# Patient Record
Sex: MALE | Race: WHITE | URBAN - NONMETROPOLITAN AREA
[De-identification: names, ages, dates, MRNs, and addresses within clinical notes are randomized per-mention and may not be internally consistent; named-entity substitution may affect disease eponyms.]

---

## 2018-09-10 ENCOUNTER — APPOINTMENT (RX ONLY)
Dept: URBAN - NONMETROPOLITAN AREA CLINIC 1 | Facility: CLINIC | Age: 78
Setting detail: DERMATOLOGY
End: 2018-09-10

## 2018-09-10 DIAGNOSIS — Z85.828 PERSONAL HISTORY OF OTHER MALIGNANT NEOPLASM OF SKIN: ICD-10-CM

## 2018-09-10 PROBLEM — Z85.820 PERSONAL HISTORY OF MALIGNANT MELANOMA OF SKIN: Status: ACTIVE | Noted: 2018-09-10

## 2018-09-10 PROBLEM — M12.9 ARTHROPATHY, UNSPECIFIED: Status: ACTIVE | Noted: 2018-09-10

## 2018-09-10 PROBLEM — L85.3 XEROSIS CUTIS: Status: ACTIVE | Noted: 2018-09-10

## 2018-09-10 PROBLEM — I10 ESSENTIAL (PRIMARY) HYPERTENSION: Status: ACTIVE | Noted: 2018-09-10

## 2018-09-10 PROBLEM — N40.0 BENIGN PROSTATIC HYPERPLASIA WITHOUT LOWER URINARY TRACT SYMPTOMS: Status: ACTIVE | Noted: 2018-09-10

## 2018-09-10 PROBLEM — C44.41 BASAL CELL CARCINOMA OF SKIN OF SCALP AND NECK: Status: ACTIVE | Noted: 2018-09-10

## 2018-09-10 PROBLEM — L29.8 OTHER PRURITUS: Status: ACTIVE | Noted: 2018-09-10

## 2018-09-10 PROBLEM — H91.90 UNSPECIFIED HEARING LOSS, UNSPECIFIED EAR: Status: ACTIVE | Noted: 2018-09-10

## 2018-09-10 PROBLEM — E13.9 OTHER SPECIFIED DIABETES MELLITUS WITHOUT COMPLICATIONS: Status: ACTIVE | Noted: 2018-09-10

## 2018-09-10 PROCEDURE — ? ADDITIONAL NOTES

## 2018-09-10 PROCEDURE — 17272 DSTR MAL LES S/N/H/F/G 1.1-2: CPT

## 2018-09-10 PROCEDURE — ? COUNSELING

## 2018-09-10 PROCEDURE — ? CURETTAGE AND DESTRUCTION WITH PATHOLOGY

## 2018-09-10 PROCEDURE — 99213 OFFICE O/P EST LOW 20 MIN: CPT | Mod: 25

## 2018-09-10 ASSESSMENT — LOCATION ZONE DERM: LOCATION ZONE: NECK

## 2018-09-10 ASSESSMENT — LOCATION SIMPLE DESCRIPTION DERM: LOCATION SIMPLE: RIGHT ANTERIOR NECK

## 2018-09-10 ASSESSMENT — LOCATION DETAILED DESCRIPTION DERM: LOCATION DETAILED: RIGHT INFERIOR ANTERIOR NECK

## 2018-09-10 NOTE — PROCEDURE: CURETTAGE AND DESTRUCTION WITH PATHOLOGY
Bill For Surgical Tray: no
Lab: -[id number]
Additional Information: (Optional): The wound was cleaned, and a pressure dressing was applied.  The patient received detailed post-op instructions.
Number Of Curettages: 1
Anesthesia Type: 1% lidocaine with epinephrine
Biopsy Type: H and E
What Was Performed First?: Curettage
Consent was obtained from the patient. The risks, benefits and alternatives to therapy were discussed in detail. Specifically, the risks of infection, scarring, bleeding, prolonged wound healing, nerve injury, incomplete removal, allergy to anesthesia and recurrence were addressed. Alternatives to ED&C, such as: surgical removal and XRT were also discussed.  Prior to the procedure, the treatment site was clearly identified and confirmed by the patient. All components of Universal Protocol/PAUSE Rule completed.
Size Of Lesion After Curettage: 1.5
Cautery Type: electrodesiccation
Histology Text: Following the procedure a portion of the curetted material was sent for histologic evaluation.
Post-Care Instructions: I reviewed with the patient in detail post-care instructions. Patient is to keep the area dry for 48 hours, and not to engage in any swimming until the area is healed. Should the patient develop any fevers, chills, bleeding, severe pain patient will contact the office immediately.
Detail Level: Detailed
Bill As A Line Item Or As Units: Line Item
Billing Type: Third-Party Bill

## 2018-09-10 NOTE — HPI: SKIN LESION (SQUAMOUS CELL CARCINOMA)
How Severe Is Your Skin Cancer?: mild
Is This A New Presentation, Or A Follow-Up?: Follow Up Squamous Cell Carcinoma
When Was Squamous Cell Carcinoma Biopsied? (Optional): Excision preformed on 06/01/2018 peripheral margins positive
Location From Outside Provider (Will Override Previously Chosen Location): Sternum

## 2018-09-10 NOTE — PROCEDURE: ADDITIONAL NOTES
Detail Level: Simple
Additional Notes: Will monitor for regrowth.  Lateral margins were treated with fluorouracil 5% cream for four weeks twice a day.

## 2018-12-11 ENCOUNTER — APPOINTMENT (RX ONLY)
Dept: URBAN - NONMETROPOLITAN AREA CLINIC 1 | Facility: CLINIC | Age: 78
Setting detail: DERMATOLOGY
End: 2018-12-11

## 2018-12-11 DIAGNOSIS — Z85.828 PERSONAL HISTORY OF OTHER MALIGNANT NEOPLASM OF SKIN: ICD-10-CM

## 2018-12-11 DIAGNOSIS — D485 NEOPLASM OF UNCERTAIN BEHAVIOR OF SKIN: ICD-10-CM

## 2018-12-11 PROBLEM — D48.5 NEOPLASM OF UNCERTAIN BEHAVIOR OF SKIN: Status: ACTIVE | Noted: 2018-12-11

## 2018-12-11 PROCEDURE — 11100: CPT

## 2018-12-11 PROCEDURE — ? CURETTAGE AND DESTRUCTION WITH PATHOLOGY

## 2018-12-11 PROCEDURE — 99213 OFFICE O/P EST LOW 20 MIN: CPT | Mod: 25

## 2018-12-11 PROCEDURE — ? ADDITIONAL NOTES

## 2018-12-11 PROCEDURE — ? COUNSELING

## 2018-12-11 ASSESSMENT — LOCATION SIMPLE DESCRIPTION DERM
LOCATION SIMPLE: RIGHT ANTERIOR NECK
LOCATION SIMPLE: LEFT FOREHEAD

## 2018-12-11 ASSESSMENT — LOCATION ZONE DERM
LOCATION ZONE: FACE
LOCATION ZONE: NECK

## 2018-12-11 ASSESSMENT — LOCATION DETAILED DESCRIPTION DERM
LOCATION DETAILED: LEFT LATERAL FOREHEAD
LOCATION DETAILED: RIGHT CLAVICULAR NECK

## 2018-12-11 NOTE — PROCEDURE: ADDITIONAL NOTES
Additional Notes: Specimen sent to [employer].\\nMOHS if positive. Additional Notes: Specimen sent to [facility].\\nMOHS if positive.

## 2018-12-28 ENCOUNTER — APPOINTMENT (RX ONLY)
Dept: URBAN - NONMETROPOLITAN AREA CLINIC 1 | Facility: CLINIC | Age: 78
Setting detail: DERMATOLOGY
End: 2018-12-28

## 2018-12-28 DIAGNOSIS — Z87.2 PERSONAL HISTORY OF DISEASES OF THE SKIN AND SUBCUTANEOUS TISSUE: ICD-10-CM

## 2018-12-28 PROCEDURE — ? COUNSELING

## 2018-12-28 PROCEDURE — 99213 OFFICE O/P EST LOW 20 MIN: CPT

## 2018-12-28 ASSESSMENT — LOCATION DETAILED DESCRIPTION DERM: LOCATION DETAILED: LEFT FOREHEAD

## 2018-12-28 ASSESSMENT — LOCATION ZONE DERM: LOCATION ZONE: FACE

## 2018-12-28 ASSESSMENT — LOCATION SIMPLE DESCRIPTION DERM: LOCATION SIMPLE: LEFT FOREHEAD

## 2018-12-28 NOTE — PROCEDURE: COUNSELING
Patient Specific Counseling (Will Not Stick From Patient To Patient): Discussed possibly excision to area if it doesn’t heal well. Will re-evaluate in 1 mo.
Detail Level: Detailed

## 2019-02-11 ENCOUNTER — APPOINTMENT (RX ONLY)
Dept: URBAN - NONMETROPOLITAN AREA CLINIC 1 | Facility: CLINIC | Age: 79
Setting detail: DERMATOLOGY
End: 2019-02-11

## 2019-02-11 DIAGNOSIS — D22 MELANOCYTIC NEVI: ICD-10-CM

## 2019-02-11 PROBLEM — D22.71 MELANOCYTIC NEVI OF RIGHT LOWER LIMB, INCLUDING HIP: Status: ACTIVE | Noted: 2019-02-11

## 2019-02-11 PROBLEM — D48.5 NEOPLASM OF UNCERTAIN BEHAVIOR OF SKIN: Status: ACTIVE | Noted: 2019-02-11

## 2019-02-11 PROCEDURE — ? BIOPSY BY SHAVE METHOD

## 2019-02-11 PROCEDURE — ? COUNSELING

## 2019-02-11 PROCEDURE — 11102 TANGNTL BX SKIN SINGLE LES: CPT

## 2019-02-11 PROCEDURE — 99213 OFFICE O/P EST LOW 20 MIN: CPT | Mod: 25

## 2019-02-11 PROCEDURE — ? OBSERVATION AND MEASURE

## 2019-02-11 ASSESSMENT — LOCATION SIMPLE DESCRIPTION DERM: LOCATION SIMPLE: RIGHT THIGH

## 2019-02-11 ASSESSMENT — LOCATION DETAILED DESCRIPTION DERM: LOCATION DETAILED: RIGHT POSTERIOR LATERAL PROXIMAL THIGH

## 2019-02-11 ASSESSMENT — LOCATION ZONE DERM: LOCATION ZONE: LEG

## 2019-02-11 NOTE — PROCEDURE: BIOPSY BY SHAVE METHOD
Post-Care Instructions: Detailed post-care instructions were given and reviewed. Patient is to keep the biopsy site dry overnight, and then apply vaseline daily until healed.  Patient was instructed to call if any questions or problems arise.

## 2019-02-11 NOTE — PROCEDURE: BIOPSY BY SHAVE METHOD
Consent: Written consent was obtained, with patient's permission, and risks were reviewed per signed consent form.  Biopsy was performed with patient's verbal permission.

## 2019-03-08 ENCOUNTER — APPOINTMENT (RX ONLY)
Dept: URBAN - NONMETROPOLITAN AREA CLINIC 1 | Facility: CLINIC | Age: 79
Setting detail: DERMATOLOGY
End: 2019-03-08

## 2019-03-08 VITALS — DIASTOLIC BLOOD PRESSURE: 72 MMHG | SYSTOLIC BLOOD PRESSURE: 101 MMHG

## 2019-03-08 PROBLEM — Z85.828 PERSONAL HISTORY OF OTHER MALIGNANT NEOPLASM OF SKIN: Status: ACTIVE | Noted: 2019-03-08

## 2019-03-08 PROBLEM — C44.329 SQUAMOUS CELL CARCINOMA OF SKIN OF OTHER PARTS OF FACE: Status: ACTIVE | Noted: 2019-03-08

## 2019-03-08 PROBLEM — D48.5 NEOPLASM OF UNCERTAIN BEHAVIOR OF SKIN: Status: ACTIVE | Noted: 2019-03-08

## 2019-03-08 PROBLEM — C44.319 BASAL CELL CARCINOMA OF SKIN OF OTHER PARTS OF FACE: Status: ACTIVE | Noted: 2019-03-08

## 2019-03-08 PROCEDURE — 17311 MOHS 1 STAGE H/N/HF/G: CPT

## 2019-03-08 PROCEDURE — 11103 TANGNTL BX SKIN EA SEP/ADDL: CPT

## 2019-03-08 PROCEDURE — ? PATIENT SPECIFIC COUNSELING

## 2019-03-08 PROCEDURE — ? ADDITIONAL NOTES

## 2019-03-08 PROCEDURE — 13132 CMPLX RPR F/C/C/M/N/AX/G/H/F: CPT | Mod: 59

## 2019-03-08 PROCEDURE — 11102 TANGNTL BX SKIN SINGLE LES: CPT | Mod: 59

## 2019-03-08 PROCEDURE — ? BIOPSY BY SHAVE METHOD WITH FROZEN SECTION

## 2019-03-08 PROCEDURE — 17311 MOHS 1 STAGE H/N/HF/G: CPT | Mod: 76

## 2019-03-08 PROCEDURE — 88331 PATH CONSLTJ SURG 1 BLK 1SPC: CPT | Mod: 59

## 2019-03-08 PROCEDURE — 17312 MOHS ADDL STAGE: CPT

## 2019-03-08 PROCEDURE — ? MOHS SURGERY

## 2019-03-08 PROCEDURE — 13133 CMPLX RPR F/C/C/M/N/AX/G/H/F: CPT | Mod: 59

## 2019-03-08 NOTE — PROCEDURE: BIOPSY BY SHAVE METHOD WITH FROZEN SECTION
Anticipated Plan (Based On Presumed Biopsy Results): Mohs today and repair - likely with adjacent defect

## 2019-03-08 NOTE — PROCEDURE: PATIENT SPECIFIC COUNSELING
In this patient's case, the two sites biopsied were in close approximation to today's primary tumor site and required biopsies because tumor in this area needed to be cleared to ensure that proper reconstruction does not involve a tumor-bearing area.
Detail Level: Zone

## 2019-03-22 ENCOUNTER — APPOINTMENT (RX ONLY)
Dept: URBAN - NONMETROPOLITAN AREA CLINIC 1 | Facility: CLINIC | Age: 79
Setting detail: DERMATOLOGY
End: 2019-03-22

## 2019-03-22 DIAGNOSIS — Z48.817 ENCOUNTER FOR SURGICAL AFTERCARE FOLLOWING SURGERY ON THE SKIN AND SUBCUTANEOUS TISSUE: ICD-10-CM

## 2019-03-22 PROCEDURE — ? PRESCRIPTION

## 2019-03-22 PROCEDURE — ? ADDITIONAL NOTES

## 2019-03-22 PROCEDURE — 99024 POSTOP FOLLOW-UP VISIT: CPT

## 2019-03-22 PROCEDURE — ? POST-OP WOUND CHECK

## 2019-03-22 RX ORDER — CLINDAMYCIN HYDROCHLORIDE 300 MG/1
CAPSULE ORAL
Qty: 14 | Refills: 0 | Status: ERX | COMMUNITY
Start: 2019-03-22

## 2019-03-22 RX ADMIN — CLINDAMYCIN HYDROCHLORIDE: 300 CAPSULE ORAL at 16:11

## 2019-03-22 ASSESSMENT — LOCATION ZONE DERM: LOCATION ZONE: FACE

## 2019-03-22 ASSESSMENT — LOCATION DETAILED DESCRIPTION DERM: LOCATION DETAILED: LEFT FOREHEAD

## 2019-03-22 ASSESSMENT — LOCATION SIMPLE DESCRIPTION DERM: LOCATION SIMPLE: LEFT FOREHEAD

## 2019-03-22 NOTE — PROCEDURE: ADDITIONAL NOTES
Additional Notes: Patient has not been changing bandage daily. Had a very bad odor when he came in. A culture and sensitivity was done today
Detail Level: Simple

## 2019-03-22 NOTE — PROCEDURE: POST-OP WOUND CHECK
Detail Level: Detailed
Add 01721 Cpt? (Important Note: In 2017 The Use Of 58336 Is Being Tracked By Cms To Determine Future Global Period Reimbursement For Global Periods): yes

## 2019-04-02 ENCOUNTER — APPOINTMENT (RX ONLY)
Dept: URBAN - NONMETROPOLITAN AREA CLINIC 1 | Facility: CLINIC | Age: 79
Setting detail: DERMATOLOGY
End: 2019-04-02

## 2019-04-02 DIAGNOSIS — Z48.817 ENCOUNTER FOR SURGICAL AFTERCARE FOLLOWING SURGERY ON THE SKIN AND SUBCUTANEOUS TISSUE: ICD-10-CM

## 2019-04-02 PROCEDURE — ? PRESCRIPTION

## 2019-04-02 PROCEDURE — ? OTHER

## 2019-04-02 PROCEDURE — ? POST-OP WOUND CHECK

## 2019-04-02 PROCEDURE — 99024 POSTOP FOLLOW-UP VISIT: CPT

## 2019-04-02 RX ORDER — MUPIROCIN 20 MG/G
OINTMENT TOPICAL
Qty: 1 | Refills: 0 | Status: ERX | COMMUNITY
Start: 2019-04-02

## 2019-04-02 RX ADMIN — MUPIROCIN: 20 OINTMENT TOPICAL at 15:13

## 2019-04-02 ASSESSMENT — LOCATION SIMPLE DESCRIPTION DERM: LOCATION SIMPLE: LEFT FOREHEAD

## 2019-04-02 ASSESSMENT — LOCATION DETAILED DESCRIPTION DERM: LOCATION DETAILED: LEFT SUPERIOR FOREHEAD

## 2019-04-02 ASSESSMENT — LOCATION ZONE DERM: LOCATION ZONE: FACE

## 2019-04-02 NOTE — PROCEDURE: POST-OP WOUND CHECK
Detail Level: Detailed
Add 63022 Cpt? (Important Note: In 2017 The Use Of 95978 Is Being Tracked By Cms To Determine Future Global Period Reimbursement For Global Periods): yes

## 2019-04-02 NOTE — PROCEDURE: OTHER
Other (Free Text): Wound cleaned with saline solution then applied a thin layer of mupiriocin ointment.
Detail Level: Zone
Note Text (......Xxx Chief Complaint.): This diagnosis correlates with the

## 2019-04-15 ENCOUNTER — APPOINTMENT (RX ONLY)
Dept: URBAN - NONMETROPOLITAN AREA CLINIC 1 | Facility: CLINIC | Age: 79
Setting detail: DERMATOLOGY
End: 2019-04-15

## 2019-04-15 DIAGNOSIS — Z48.817 ENCOUNTER FOR SURGICAL AFTERCARE FOLLOWING SURGERY ON THE SKIN AND SUBCUTANEOUS TISSUE: ICD-10-CM

## 2019-04-15 PROCEDURE — 99024 POSTOP FOLLOW-UP VISIT: CPT

## 2019-04-15 PROCEDURE — ? POST-OP WOUND CHECK

## 2019-04-15 ASSESSMENT — LOCATION SIMPLE DESCRIPTION DERM: LOCATION SIMPLE: LEFT FOREHEAD

## 2019-04-15 ASSESSMENT — LOCATION DETAILED DESCRIPTION DERM: LOCATION DETAILED: LEFT SUPERIOR FOREHEAD

## 2019-04-15 ASSESSMENT — LOCATION ZONE DERM: LOCATION ZONE: FACE

## 2019-04-15 NOTE — PROCEDURE: POST-OP WOUND CHECK
Add 34605 Cpt? (Important Note: In 2017 The Use Of 00067 Is Being Tracked By Cms To Determine Future Global Period Reimbursement For Global Periods): yes
Detail Level: Detailed
Additional Comments: Patient to continue Mupirocin 2% ointment BID.
Wound Evaluated By: Abhay Lawson PA-C

## 2019-04-22 NOTE — PROCEDURE: MOHS SURGERY
Start taking 1/2 to 1 capful of miralax every day or every other day   If symptoms are not improving, we can proceed with a colonoscopy for further evaluation.   Please call [phone] to schedule the colonoscopy with Dr. Cueva.    Mastoid Interpolation Flap Text: A decision was made to reconstruct the defect utilizing an interpolation axial flap and a staged reconstruction.  A telfa template was made of the defect.  This telfa template was then used to outline the mastoid interpolation flap.  The donor area for the pedicle flap was then injected with anesthesia.  The flap was excised through the skin and subcutaneous tissue down to the layer of the underlying musculature.  The pedicle flap was carefully excised within this deep plane to maintain its blood supply.  The edges of the donor site were undermined.   The donor site was closed in a primary fashion.  The pedicle was then rotated into position and sutured.  Once the tube was sutured into place, adequate blood supply was confirmed with blanching and refill.  The pedicle was then wrapped with xeroform gauze and dressed appropriately with a telfa and gauze bandage to ensure continued blood supply and protect the attached pedicle.

## 2020-10-13 NOTE — PROCEDURE: BIOPSY BY SHAVE METHOD WITH FROZEN SECTION
-- DO NOT REPLY / DO NOT REPLY ALL --  -- Message is from the [facility]--    COVID-19 Universal Screening: N/A - Not about scheduling    General Patient Message      Reason for Call: Patient wife called for second time stating patient is bleeding frim his penis this has happened before and wa sprescibed meds but it does not seem to be working. Please call back to discuss the following     Caller Information       Type Contact Phone    10/13/2020 10:46 AM CDT Phone (Incoming) STEVE CLARK (Emergency Contact) [phone]          Alternative phone number: none    Turnaround time given to caller:   \"This message will be sent to [state Provider's name]. The clinical team will fulfill your request as soon as they review your message.\"     Post-Care Instructions: I reviewed with the patient in detail post-care instructions. Patient is to keep the biopsy site dry overnight, and then apply bacitracin twice daily until healed. Patient may apply hydrogen peroxide soaks to remove any crusting.

## 2021-11-16 NOTE — PROCEDURE: MOHS SURGERY
Stable. Burow's Advancement Flap Text: The defect edges were debeveled with a #15 scalpel blade.  Given the location of the defect and the proximity to free margins a Burow's advancement flap was deemed most appropriate.  Using a sterile surgical marker, the appropriate advancement flap was drawn incorporating the defect and placing the expected incisions within the relaxed skin tension lines where possible.    The area thus outlined was incised deep to adipose tissue with a #15 scalpel blade.  The skin margins were undermined to an appropriate distance in all directions utilizing iris scissors.

## 2023-03-30 ENCOUNTER — OFFICE VISIT (OUTPATIENT)
Dept: FAMILY MEDICINE CLINIC | Facility: CLINIC | Age: 83
End: 2023-03-30
Payer: COMMERCIAL

## 2023-03-30 VITALS
TEMPERATURE: 98.1 F | WEIGHT: 249.4 LBS | HEIGHT: 69 IN | DIASTOLIC BLOOD PRESSURE: 60 MMHG | HEART RATE: 72 BPM | SYSTOLIC BLOOD PRESSURE: 118 MMHG | OXYGEN SATURATION: 96 % | BODY MASS INDEX: 36.94 KG/M2

## 2023-03-30 DIAGNOSIS — L21.8 OTHER SEBORRHEIC DERMATITIS: ICD-10-CM

## 2023-03-30 DIAGNOSIS — I10 PRIMARY HYPERTENSION: ICD-10-CM

## 2023-03-30 DIAGNOSIS — C67.9 PAPILLARY ADENOCARCINOMA OF BLADDER (HCC): ICD-10-CM

## 2023-03-30 DIAGNOSIS — E78.2 MIXED HYPERLIPIDEMIA: ICD-10-CM

## 2023-03-30 DIAGNOSIS — Z12.5 SCREENING FOR MALIGNANT NEOPLASM OF PROSTATE: ICD-10-CM

## 2023-03-30 DIAGNOSIS — E55.9 VITAMIN D DEFICIENCY: ICD-10-CM

## 2023-03-30 DIAGNOSIS — L85.3 XEROSIS OF SKIN: ICD-10-CM

## 2023-03-30 DIAGNOSIS — E11.65 UNCONTROLLED TYPE 2 DIABETES MELLITUS WITH HYPERGLYCEMIA (HCC): Primary | ICD-10-CM

## 2023-03-30 DIAGNOSIS — Z98.890 HISTORY OF TRANSURETHRAL DESTRUCTION OF BLADDER LESION: ICD-10-CM

## 2023-03-30 DIAGNOSIS — E11.39 DIABETIC EYES (HCC): ICD-10-CM

## 2023-03-30 DIAGNOSIS — R09.89 DECREASED PULSES IN FEET: ICD-10-CM

## 2023-03-30 LAB
BASOPHILS # BLD: 0.1 K/UL (ref 0–0.2)
BASOPHILS NFR BLD: 1 % (ref 0–2)
DIFFERENTIAL METHOD BLD: ABNORMAL
EOSINOPHIL # BLD: 0.9 K/UL (ref 0–0.8)
EOSINOPHIL NFR BLD: 8 % (ref 0.5–7.8)
ERYTHROCYTE [DISTWIDTH] IN BLOOD BY AUTOMATED COUNT: 13.7 % (ref 11.9–14.6)
EST. AVERAGE GLUCOSE BLD GHB EST-MCNC: 249 MG/DL
HBA1C MFR BLD: 10.3 % (ref 4.8–5.6)
HCT VFR BLD AUTO: 45.8 % (ref 41.1–50.3)
HGB BLD-MCNC: 14.9 G/DL (ref 13.6–17.2)
IMM GRANULOCYTES # BLD AUTO: 0.1 K/UL (ref 0–0.5)
IMM GRANULOCYTES NFR BLD AUTO: 1 % (ref 0–5)
LYMPHOCYTES # BLD: 4.7 K/UL (ref 0.5–4.6)
LYMPHOCYTES NFR BLD: 38 % (ref 13–44)
MCH RBC QN AUTO: 29.9 PG (ref 26.1–32.9)
MCHC RBC AUTO-ENTMCNC: 32.5 G/DL (ref 31.4–35)
MCV RBC AUTO: 92 FL (ref 82–102)
MONOCYTES # BLD: 0.9 K/UL (ref 0.1–1.3)
MONOCYTES NFR BLD: 8 % (ref 4–12)
NEUTS SEG # BLD: 5.6 K/UL (ref 1.7–8.2)
NEUTS SEG NFR BLD: 44 % (ref 43–78)
NRBC # BLD: 0 K/UL (ref 0–0.2)
PLATELET # BLD AUTO: 235 K/UL (ref 150–450)
PMV BLD AUTO: 10.2 FL (ref 9.4–12.3)
RBC # BLD AUTO: 4.98 M/UL (ref 4.23–5.6)
WBC # BLD AUTO: 12.3 K/UL (ref 4.3–11.1)

## 2023-03-30 PROCEDURE — 3046F HEMOGLOBIN A1C LEVEL >9.0%: CPT | Performed by: FAMILY MEDICINE

## 2023-03-30 PROCEDURE — 3078F DIAST BP <80 MM HG: CPT | Performed by: FAMILY MEDICINE

## 2023-03-30 PROCEDURE — 99203 OFFICE O/P NEW LOW 30 MIN: CPT | Performed by: FAMILY MEDICINE

## 2023-03-30 PROCEDURE — 3074F SYST BP LT 130 MM HG: CPT | Performed by: FAMILY MEDICINE

## 2023-03-30 PROCEDURE — 1123F ACP DISCUSS/DSCN MKR DOCD: CPT | Performed by: FAMILY MEDICINE

## 2023-03-30 RX ORDER — RIVAROXABAN 15 MG/1
TABLET, FILM COATED ORAL
COMMUNITY
Start: 2023-03-24

## 2023-03-30 RX ORDER — INSULIN DETEMIR 100 [IU]/ML
30 INJECTION, SOLUTION SUBCUTANEOUS 2 TIMES DAILY
COMMUNITY
Start: 2023-02-11

## 2023-03-30 RX ORDER — LISINOPRIL AND HYDROCHLOROTHIAZIDE 20; 12.5 MG/1; MG/1
TABLET ORAL
COMMUNITY
Start: 2023-02-21

## 2023-03-30 RX ORDER — DILTIAZEM HYDROCHLORIDE 360 MG/1
CAPSULE, EXTENDED RELEASE ORAL
COMMUNITY
Start: 2023-01-09

## 2023-03-30 RX ORDER — CARVEDILOL 6.25 MG/1
25 TABLET ORAL
COMMUNITY
Start: 2023-01-19

## 2023-03-30 RX ORDER — ROSUVASTATIN CALCIUM 10 MG/1
TABLET, COATED ORAL
COMMUNITY
Start: 2023-03-24

## 2023-03-30 RX ORDER — DIGOXIN 125 MCG
TABLET ORAL
COMMUNITY
Start: 2023-01-03

## 2023-03-30 RX ORDER — INSULIN ASPART 100 [IU]/ML
INJECTION, SOLUTION INTRAVENOUS; SUBCUTANEOUS
COMMUNITY
Start: 2023-02-17

## 2023-03-30 RX ORDER — BLOOD SUGAR DIAGNOSTIC
STRIP MISCELLANEOUS
COMMUNITY
Start: 2023-03-03

## 2023-03-30 SDOH — ECONOMIC STABILITY: INCOME INSECURITY: HOW HARD IS IT FOR YOU TO PAY FOR THE VERY BASICS LIKE FOOD, HOUSING, MEDICAL CARE, AND HEATING?: NOT HARD AT ALL

## 2023-03-30 SDOH — ECONOMIC STABILITY: FOOD INSECURITY: WITHIN THE PAST 12 MONTHS, YOU WORRIED THAT YOUR FOOD WOULD RUN OUT BEFORE YOU GOT MONEY TO BUY MORE.: NEVER TRUE

## 2023-03-30 SDOH — ECONOMIC STABILITY: HOUSING INSECURITY
IN THE LAST 12 MONTHS, WAS THERE A TIME WHEN YOU DID NOT HAVE A STEADY PLACE TO SLEEP OR SLEPT IN A SHELTER (INCLUDING NOW)?: NO

## 2023-03-30 SDOH — ECONOMIC STABILITY: FOOD INSECURITY: WITHIN THE PAST 12 MONTHS, THE FOOD YOU BOUGHT JUST DIDN'T LAST AND YOU DIDN'T HAVE MONEY TO GET MORE.: NEVER TRUE

## 2023-03-30 ASSESSMENT — PATIENT HEALTH QUESTIONNAIRE - PHQ9
1. LITTLE INTEREST OR PLEASURE IN DOING THINGS: 0
2. FEELING DOWN, DEPRESSED OR HOPELESS: 0
SUM OF ALL RESPONSES TO PHQ9 QUESTIONS 1 & 2: 0
SUM OF ALL RESPONSES TO PHQ QUESTIONS 1-9: 0

## 2023-03-31 LAB
25(OH)D3 SERPL-MCNC: 47.1 NG/ML (ref 30–100)
ALBUMIN SERPL-MCNC: 3.3 G/DL (ref 3.2–4.6)
ALBUMIN/GLOB SERPL: 1 (ref 0.4–1.6)
ALP SERPL-CCNC: 121 U/L (ref 50–136)
ALT SERPL-CCNC: 24 U/L (ref 12–65)
ANION GAP SERPL CALC-SCNC: 4 MMOL/L (ref 2–11)
AST SERPL-CCNC: 16 U/L (ref 15–37)
BILIRUB SERPL-MCNC: 0.7 MG/DL (ref 0.2–1.1)
BUN SERPL-MCNC: 29 MG/DL (ref 8–23)
CALCIUM SERPL-MCNC: 8.9 MG/DL (ref 8.3–10.4)
CHLORIDE SERPL-SCNC: 106 MMOL/L (ref 101–110)
CHOLEST SERPL-MCNC: 126 MG/DL
CO2 SERPL-SCNC: 28 MMOL/L (ref 21–32)
CREAT SERPL-MCNC: 1.6 MG/DL (ref 0.8–1.5)
GLOBULIN SER CALC-MCNC: 3.3 G/DL (ref 2.8–4.5)
GLUCOSE SERPL-MCNC: 258 MG/DL (ref 65–100)
HDLC SERPL-MCNC: 42 MG/DL (ref 40–60)
HDLC SERPL: 3
LDLC SERPL CALC-MCNC: 57.6 MG/DL
POTASSIUM SERPL-SCNC: 4.5 MMOL/L (ref 3.5–5.1)
PROT SERPL-MCNC: 6.6 G/DL (ref 6.3–8.2)
PSA SERPL-MCNC: 4.1 NG/ML
SODIUM SERPL-SCNC: 138 MMOL/L (ref 133–143)
TRIGL SERPL-MCNC: 132 MG/DL (ref 35–150)
VLDLC SERPL CALC-MCNC: 26.4 MG/DL (ref 6–23)

## 2023-04-01 ASSESSMENT — ENCOUNTER SYMPTOMS
BLURRED VISION: 0
PHOTOPHOBIA: 0
SHORTNESS OF BREATH: 0
COLOR CHANGE: 0
EYE PAIN: 0
COUGH: 0
NAUSEA: 0
ABDOMINAL PAIN: 0
SORE THROAT: 0
BLOOD IN STOOL: 0
ABDOMINAL DISTENTION: 0

## 2023-04-01 NOTE — PROGRESS NOTES
Dmitri Sun  1940 is a 78 y.o. male ,Established patient, here for evaluation of the following chief complaint(s):   Chief Complaint   Patient presents with    Establish Care     When covid hit kept up with specialist like cardio but lost pcp and just wanting to get one. Would like to have referrals put in for our system for he can get out of [place]. Would need an eye doctor and urologist the first step for him          1. Uncontrolled type 2 diabetes mellitus with hyperglycemia ([address]  -     [address]  -     CBC with Auto Differential; Future  -     Comprehensive Metabolic Panel; Future  -     Hemoglobin A1C; Future  -     [facility] - [employer]  -     Vascular duplex lower extremity arteries bilateral; Future  2. Screening for malignant neoplasm of prostate  -     PSA Screening; Future  3. Papillary adenocarcinoma of bladder ([address]  -     CBC with Auto Differential; Future  -     Comprehensive Metabolic Panel; Future  -     [facility], Urology, [place]  4. Primary hypertension  -     CBC with Auto Differential; Future  -     Comprehensive Metabolic Panel; Future  5. Vitamin D deficiency  -     Vitamin D 25 Hydroxy; Future  6. Mixed hyperlipidemia  -     Lipid Panel; Future  7. History of transurethral destruction of bladder lesion  -     [facility], Urology, [place]  8. Diabetic eyes ([address]  -     [address] [facility] [address] [place]  9. Xerosis of skin  -     Vascular duplex lower extremity arteries bilateral; Future  10. Other seborrheic dermatitis  -     Vascular duplex lower extremity arteries bilateral; Future  11. Decreased pulses in feet        Return in about 6 weeks (around 5/11/2023). Subjective   SUBJECTIVE/OBJECTIVE:  Hypertension  This is a chronic problem. The current episode started more than 1 year ago. The problem has been gradually improving since onset. The problem is controlled.  Pertinent negatives include

## 2023-04-24 ENCOUNTER — OFFICE VISIT (OUTPATIENT)
Dept: FAMILY MEDICINE CLINIC | Facility: CLINIC | Age: 83
End: 2023-04-24
Payer: MEDICARE

## 2023-04-24 VITALS
DIASTOLIC BLOOD PRESSURE: 60 MMHG | HEART RATE: 82 BPM | TEMPERATURE: 98 F | SYSTOLIC BLOOD PRESSURE: 130 MMHG | WEIGHT: 247.8 LBS | BODY MASS INDEX: 36.7 KG/M2 | HEIGHT: 69 IN | OXYGEN SATURATION: 94 %

## 2023-04-24 DIAGNOSIS — E11.65 UNCONTROLLED TYPE 2 DIABETES MELLITUS WITH HYPERGLYCEMIA (HCC): ICD-10-CM

## 2023-04-24 DIAGNOSIS — L03.116 CELLULITIS OF LEFT LOWER EXTREMITY: ICD-10-CM

## 2023-04-24 DIAGNOSIS — I89.0 CHRONIC ACQUIRED LYMPHEDEMA: Primary | ICD-10-CM

## 2023-04-24 PROCEDURE — 99213 OFFICE O/P EST LOW 20 MIN: CPT | Performed by: PHYSICIAN ASSISTANT

## 2023-04-24 PROCEDURE — 1123F ACP DISCUSS/DSCN MKR DOCD: CPT | Performed by: PHYSICIAN ASSISTANT

## 2023-04-24 PROCEDURE — 3046F HEMOGLOBIN A1C LEVEL >9.0%: CPT | Performed by: PHYSICIAN ASSISTANT

## 2023-04-24 RX ORDER — AMOXICILLIN AND CLAVULANATE POTASSIUM 875; 125 MG/1; MG/1
1 TABLET, FILM COATED ORAL 2 TIMES DAILY
Qty: 20 TABLET | Refills: 0 | Status: SHIPPED | OUTPATIENT
Start: 2023-04-24 | End: 2023-05-04

## 2023-04-24 ASSESSMENT — PATIENT HEALTH QUESTIONNAIRE - PHQ9
SUM OF ALL RESPONSES TO PHQ QUESTIONS 1-9: 0
1. LITTLE INTEREST OR PLEASURE IN DOING THINGS: 0
SUM OF ALL RESPONSES TO PHQ9 QUESTIONS 1 & 2: 0
SUM OF ALL RESPONSES TO PHQ QUESTIONS 1-9: 0
SUM OF ALL RESPONSES TO PHQ QUESTIONS 1-9: 0
2. FEELING DOWN, DEPRESSED OR HOPELESS: 0
SUM OF ALL RESPONSES TO PHQ QUESTIONS 1-9: 0

## 2023-04-24 NOTE — PROGRESS NOTES
[address] [place] [address]  Phone [phone]  Fax [phone]  [place]    Patient: Shelley Gale  YOB: 1940  Patient Age 78 y.o. Patient sex: male  Medical Record:  [id number]  Visit Date:4/24/2023   Author:  Tomas Dukes. [facility] Note     Chief complaint  Shelley Gale  is a 78 y.o. male who was seen on 04/24/23  10:50 AM  for the following reasons:    Chief Complaint   Patient presents with    Foot Pain     Bilateral--x 1 wk    R foot with sore--hx DM       Current Medical problems addressed    Wound on the ankle on the left leg     ASSESSMENT AND PLAN    ICD-10-CM    1. Chronic acquired lymphedema  I89.0       2. Cellulitis of left lower extremity  L03.116          Sterling Seip was seen today for foot pain. Diagnoses and all orders for this visit:    Chronic acquired lymphedema    Cellulitis of left lower extremity      Plan includes the following:  Plan  Advised to start antibiotics and return if not improving in 5-7 days or sooner if symptoms worsen. Start with wound care and return for recheck in 3 days if they have not seen him   No follow-up provider specified. No orders of the defined types were placed in this encounter. Past Medical History:   Allergies:No Known Allergies    Current Medications:   Medications marked \"taking\" at this time:  Current Outpatient Medications   Medication Sig Dispense Refill    carvedilol (COREG) 6.25 MG tablet 4 tablets      Cyanocobalamin 2500 MCG TABS Take by mouth daily      digoxin (LANOXIN) 125 MCG tablet       dilTIAZem (TIAZAC) 360 MG extended release capsule       ONETOUCH VERIO strip       NOVOLOG FLEXPEN 100 UNIT/ML injection pen       LEVEMIR FLEXTOUCH 100 UNIT/ML injection pen 3,000 Units in the morning and at bedtime      lisinopril-hydroCHLOROthiazide (PRINZIDE;ZESTORETIC) 20-12.5 MG per tablet       XARELTO 15 MG TABS tablet       rosuvastatin (CRESTOR) 10 MG

## 2023-04-27 ENCOUNTER — HOSPITAL ENCOUNTER (OUTPATIENT)
Dept: ULTRASOUND IMAGING | Age: 83
Discharge: HOME | End: 2023-04-27
Payer: MEDICARE

## 2023-04-27 DIAGNOSIS — E11.65 UNCONTROLLED TYPE 2 DIABETES MELLITUS WITH HYPERGLYCEMIA (HCC): ICD-10-CM

## 2023-04-27 DIAGNOSIS — L85.3 XEROSIS OF SKIN: ICD-10-CM

## 2023-04-27 DIAGNOSIS — L21.8 OTHER SEBORRHEIC DERMATITIS: ICD-10-CM

## 2023-04-27 PROCEDURE — 93925 LOWER EXTREMITY STUDY: CPT

## 2023-04-27 ASSESSMENT — ENCOUNTER SYMPTOMS
SHORTNESS OF BREATH: 0
COUGH: 0

## 2023-05-05 ENCOUNTER — HOSPITAL ENCOUNTER (EMERGENCY)
Age: 83
Discharge: HOME | End: 2023-05-05
Attending: EMERGENCY MEDICINE
Payer: MEDICARE

## 2023-05-05 ENCOUNTER — OFFICE VISIT (OUTPATIENT)
Dept: FAMILY MEDICINE CLINIC | Facility: CLINIC | Age: 83
End: 2023-05-05
Payer: MEDICARE

## 2023-05-05 ENCOUNTER — APPOINTMENT (OUTPATIENT)
Dept: ULTRASOUND IMAGING | Age: 83
End: 2023-05-05
Payer: MEDICARE

## 2023-05-05 VITALS
SYSTOLIC BLOOD PRESSURE: 112 MMHG | HEART RATE: 82 BPM | WEIGHT: 247 LBS | RESPIRATION RATE: 17 BRPM | TEMPERATURE: 97 F | HEIGHT: 69 IN | OXYGEN SATURATION: 96 % | DIASTOLIC BLOOD PRESSURE: 72 MMHG | BODY MASS INDEX: 36.58 KG/M2

## 2023-05-05 VITALS
BODY MASS INDEX: 36.58 KG/M2 | HEART RATE: 77 BPM | TEMPERATURE: 97.6 F | RESPIRATION RATE: 16 BRPM | DIASTOLIC BLOOD PRESSURE: 75 MMHG | HEIGHT: 69 IN | SYSTOLIC BLOOD PRESSURE: 137 MMHG | OXYGEN SATURATION: 95 % | WEIGHT: 247 LBS

## 2023-05-05 DIAGNOSIS — M79.89 LEFT LEG SWELLING: Primary | ICD-10-CM

## 2023-05-05 DIAGNOSIS — I87.2 STASIS DERMATITIS, ACUTE: ICD-10-CM

## 2023-05-05 DIAGNOSIS — S81.802D WOUND OF LEFT LOWER EXTREMITY, SUBSEQUENT ENCOUNTER: Primary | ICD-10-CM

## 2023-05-05 LAB
ALBUMIN SERPL-MCNC: 2.9 G/DL (ref 3.2–4.6)
ALBUMIN/GLOB SERPL: 0.7 (ref 0.4–1.6)
ALP SERPL-CCNC: 98 U/L (ref 50–136)
ALT SERPL-CCNC: 27 U/L (ref 12–65)
ANION GAP SERPL CALC-SCNC: 2 MMOL/L (ref 2–11)
AST SERPL-CCNC: 15 U/L (ref 15–37)
BACTERIA SPEC CULT: NORMAL
BASOPHILS # BLD: 0.1 K/UL (ref 0–0.2)
BASOPHILS NFR BLD: 1 % (ref 0–2)
BILIRUB SERPL-MCNC: 0.6 MG/DL (ref 0.2–1.1)
BUN SERPL-MCNC: 23 MG/DL (ref 8–23)
CALCIUM SERPL-MCNC: 8.6 MG/DL (ref 8.3–10.4)
CHLORIDE SERPL-SCNC: 107 MMOL/L (ref 101–110)
CO2 SERPL-SCNC: 30 MMOL/L (ref 21–32)
CREAT SERPL-MCNC: 1.44 MG/DL (ref 0.8–1.5)
DIFFERENTIAL METHOD BLD: ABNORMAL
EOSINOPHIL # BLD: 0.9 K/UL (ref 0–0.8)
EOSINOPHIL NFR BLD: 8 % (ref 0.5–7.8)
ERYTHROCYTE [DISTWIDTH] IN BLOOD BY AUTOMATED COUNT: 13.9 % (ref 11.9–14.6)
GLOBULIN SER CALC-MCNC: 4.1 G/DL (ref 2.8–4.5)
GLUCOSE SERPL-MCNC: 213 MG/DL (ref 65–100)
HCT VFR BLD AUTO: 44.6 % (ref 41.1–50.3)
HGB BLD-MCNC: 14.5 G/DL (ref 13.6–17.2)
IMM GRANULOCYTES # BLD AUTO: 0.1 K/UL (ref 0–0.5)
IMM GRANULOCYTES NFR BLD AUTO: 0 % (ref 0–5)
LACTATE SERPL-SCNC: 1.1 MMOL/L (ref 0.4–2)
LYMPHOCYTES # BLD: 4 K/UL (ref 0.5–4.6)
LYMPHOCYTES NFR BLD: 36 % (ref 13–44)
MCH RBC QN AUTO: 29.5 PG (ref 26.1–32.9)
MCHC RBC AUTO-ENTMCNC: 32.5 G/DL (ref 31.4–35)
MCV RBC AUTO: 90.8 FL (ref 82–102)
MONOCYTES # BLD: 0.9 K/UL (ref 0.1–1.3)
MONOCYTES NFR BLD: 8 % (ref 4–12)
NEUTS SEG # BLD: 5.2 K/UL (ref 1.7–8.2)
NEUTS SEG NFR BLD: 47 % (ref 43–78)
NRBC # BLD: 0 K/UL (ref 0–0.2)
PLATELET # BLD AUTO: 216 K/UL (ref 150–450)
PMV BLD AUTO: 9.2 FL (ref 9.4–12.3)
POTASSIUM SERPL-SCNC: 4 MMOL/L (ref 3.5–5.1)
PROCALCITONIN SERPL-MCNC: <0.05 NG/ML (ref 0–0.49)
PROT SERPL-MCNC: 7 G/DL (ref 6.3–8.2)
RBC # BLD AUTO: 4.91 M/UL (ref 4.23–5.6)
SERVICE CMNT-IMP: NORMAL
SODIUM SERPL-SCNC: 139 MMOL/L (ref 133–143)
WBC # BLD AUTO: 11.2 K/UL (ref 4.3–11.1)

## 2023-05-05 PROCEDURE — 99284 EMERGENCY DEPT VISIT MOD MDM: CPT

## 2023-05-05 PROCEDURE — 1123F ACP DISCUSS/DSCN MKR DOCD: CPT

## 2023-05-05 PROCEDURE — 87070 CULTURE OTHR SPECIMN AEROBIC: CPT

## 2023-05-05 PROCEDURE — 87075 CULTR BACTERIA EXCEPT BLOOD: CPT

## 2023-05-05 PROCEDURE — 83605 ASSAY OF LACTIC ACID: CPT

## 2023-05-05 PROCEDURE — 87077 CULTURE AEROBIC IDENTIFY: CPT

## 2023-05-05 PROCEDURE — 85025 COMPLETE CBC W/AUTO DIFF WBC: CPT

## 2023-05-05 PROCEDURE — 93971 EXTREMITY STUDY: CPT

## 2023-05-05 PROCEDURE — 87205 SMEAR GRAM STAIN: CPT

## 2023-05-05 PROCEDURE — 87186 SC STD MICRODIL/AGAR DIL: CPT

## 2023-05-05 PROCEDURE — 99203 OFFICE O/P NEW LOW 30 MIN: CPT

## 2023-05-05 PROCEDURE — 84145 PROCALCITONIN (PCT): CPT

## 2023-05-05 PROCEDURE — 80053 COMPREHEN METABOLIC PANEL: CPT

## 2023-05-05 RX ORDER — SULFAMETHOXAZOLE AND TRIMETHOPRIM 800; 160 MG/1; MG/1
1 TABLET ORAL 2 TIMES DAILY
Qty: 10 TABLET | Refills: 0 | Status: SHIPPED | OUTPATIENT
Start: 2023-05-05 | End: 2023-05-10

## 2023-05-05 ASSESSMENT — PATIENT HEALTH QUESTIONNAIRE - PHQ9
SUM OF ALL RESPONSES TO PHQ QUESTIONS 1-9: 0
SUM OF ALL RESPONSES TO PHQ9 QUESTIONS 1 & 2: 0
SUM OF ALL RESPONSES TO PHQ QUESTIONS 1-9: 0
1. LITTLE INTEREST OR PLEASURE IN DOING THINGS: 0
2. FEELING DOWN, DEPRESSED OR HOPELESS: 0
SUM OF ALL RESPONSES TO PHQ QUESTIONS 1-9: 0
SUM OF ALL RESPONSES TO PHQ QUESTIONS 1-9: 0

## 2023-05-05 ASSESSMENT — ENCOUNTER SYMPTOMS
COLOR CHANGE: 1
RESPIRATORY NEGATIVE: 1
SHORTNESS OF BREATH: 0
EYES NEGATIVE: 1
ABDOMINAL PAIN: 0
VOMITING: 0
GASTROINTESTINAL NEGATIVE: 1
NAUSEA: 0
ALLERGIC/IMMUNOLOGIC NEGATIVE: 1
COLOR CHANGE: 1

## 2023-05-05 ASSESSMENT — LIFESTYLE VARIABLES: HOW MANY STANDARD DRINKS CONTAINING ALCOHOL DO YOU HAVE ON A TYPICAL DAY: PATIENT DOES NOT DRINK

## 2023-05-05 ASSESSMENT — PAIN - FUNCTIONAL ASSESSMENT: PAIN_FUNCTIONAL_ASSESSMENT: NONE - DENIES PAIN

## 2023-05-05 NOTE — ED NOTES
I have reviewed discharge instructions with the patient. The patient verbalized understanding. Patient left ED via Discharge Method: wheelchair to Home with (family/friend). Opportunity for questions and clarification provided. Patient given 0 scripts. No esign x1 prescription sent to pharmacy         To continue your aftercare when you leave the hospital, you may receive an automated call from our care team to check in on how you are doing. This is a free service and part of our promise to provide the best care and service to meet your aftercare needs.  If you have questions, or wish to unsubscribe from this service please call [phone]. Thank you for Choosing our [facility] Emergency Department.        Cecy Kaba RN  05/05/23 9967

## 2023-05-05 NOTE — ED NOTES
I have reviewed discharge instructions with the patient. The patient verbalized understanding. Patient left ED via Discharge Method: ambulatory to Home with (spouse    Opportunity for questions and clarification provided. Patient given 1 escripts. To continue your aftercare when you leave the hospital, you may receive an automated call from our care team to check in on how you are doing. This is a free service and part of our promise to provide the best care and service to meet your aftercare needs.  If you have questions, or wish to unsubscribe from this service please call [phone]. Thank you for Choosing our [facility] Emergency Department.         Francis Pineda, [address] [place] [address]  05/05/23 7367

## 2023-05-05 NOTE — ED PROVIDER NOTES
Emergency Department Provider Note       PCP: Geovany Carl MD   Age: 78 y.o. Sex: male     DISPOSITION Decision To Discharge 05/05/2023 03:58:33 PM       ICD-10-CM    1. Left leg swelling  M79.89       2. Stasis dermatitis, acute  I87.2           Medical Decision Making     Complexity of Problems Addressed:  1 or more chronic illnesses with a severe exacerbation or progression. Data Reviewed and Analyzed:  Category 1:   I independently ordered and reviewed each unique test.  I reviewed external records: provider visit note from PCP. I reviewed external records: provider visit note from outside specialist.       Category 2:   I interpreted the Ultrasound   . Category 3: Discussion of management or test interpretation. 70-year-old male presenting to the department today for evaluation of left leg swelling and discoloration. He saw his primary care provider who sent him here for evaluation today. He has some chronic skin changes due to peripheral vascular disease noted to the left lower extremity as well as some swelling. There is some oozing of foul-smelling discharge as well. Patient had ultrasound of his lower extremity arteries last week, venous ultrasound negative for DVT. Labs today are overall reassuring. We will start patient on Bactrim for cellulitis due to previous side effects from his Augmentin. He will stop this medication. He has a follow-up appointment with his PCP on Monday, as well as wound care follow-up the next day. Discussed wound care, elevating th leg,  We discussed symptoms that would prompt emergent reevaluation. Patient in agreement with discharge plan. Risk of Complications and/or Morbidity of Patient Management:  Shared medical decision making was utilized in creating the patients health plan today.     ED Course as of 05/05/23 1608   Fri May 05, 2023   1339 WBC(!): 11.2 [KE]   1508 FINDINGS: There is good flow, good augmentation of flow with distal

## 2023-05-05 NOTE — PROGRESS NOTES
(PRINZIDE;ZESTORETIC) 20-12.5 MG per tablet       XARELTO 15 MG TABS tablet       rosuvastatin (CRESTOR) 10 MG tablet        No current facility-administered medications for this visit. No Known Allergies     Reviewed and updated this visit by provider:            Review of Systems   Constitutional: Negative. HENT: Negative. Eyes: Negative. Respiratory: Negative. Cardiovascular: Negative. Gastrointestinal: Negative. Endocrine: Negative. Genitourinary: Negative. Musculoskeletal: Negative. Skin:  Positive for color change and wound. Allergic/Immunologic: Negative. Neurological: Negative. Hematological: Negative. Psychiatric/Behavioral: Negative. Objective:     Vitals:    05/05/23 1043   BP: 112/72   Pulse: 82   Resp: 17   Temp: 97 °F (36.1 °C)   SpO2: 96%   Weight: 247 lb (112 kg)   Height: 5' 9\" (1.753 m)        Physical Exam  Constitutional:       Appearance: He is obese. HENT:      Head: Normocephalic and atraumatic. Cardiovascular:      Rate and Rhythm: Normal rate and regular rhythm. Pulses:           Dorsalis pedis pulses are 0 on the left side. Posterior tibial pulses are 0 on the left side. Heart sounds: Normal heart sounds. Pulmonary:      Effort: Pulmonary effort is normal.      Breath sounds: Normal breath sounds. Feet:      Right foot:      Skin integrity: Skin breakdown and dry skin present. Toenail Condition: Right toenails are abnormally thick. Left foot:      Skin integrity: Ulcer, blister, skin breakdown, erythema, warmth and dry skin present. Toenail Condition: Left toenails are abnormally thick. Comments: Left foot toes are starting to darken in color. Skin:     Findings: Erythema, rash and wound present. Rash is crusting. Comments: Severe edema and erythema in calf    Neurological:      Mental Status: He is alert and oriented to person, place, and time.    Psychiatric:         Mood

## 2023-05-05 NOTE — DISCHARGE INSTRUCTIONS
As discussed your labs today were reassuring. Ultrasound did not show any signs of DVT. Start taking the antibiotics as prescribed for full course of treatment. Keep your appointment with wound care as planned on Monday. Keep your leg clean, you can use warm water and soap to gently wash your leg. Do not vigorously scrub at the skin. Follow-up with your PCP in 1 to 2 days if no improvement. Return to the ER for any new or worsening symptoms.

## 2023-05-07 LAB
BACTERIA SPEC CULT: ABNORMAL
GRAM STN SPEC: ABNORMAL
GRAM STN SPEC: ABNORMAL
SERVICE CMNT-IMP: ABNORMAL

## 2023-05-08 ENCOUNTER — OFFICE VISIT (OUTPATIENT)
Dept: FAMILY MEDICINE CLINIC | Facility: CLINIC | Age: 83
End: 2023-05-08
Payer: MEDICARE

## 2023-05-08 VITALS
WEIGHT: 256.6 LBS | DIASTOLIC BLOOD PRESSURE: 60 MMHG | SYSTOLIC BLOOD PRESSURE: 100 MMHG | BODY MASS INDEX: 37.89 KG/M2 | TEMPERATURE: 97.8 F | HEART RATE: 77 BPM | OXYGEN SATURATION: 95 %

## 2023-05-08 DIAGNOSIS — R09.89 POOR CIRCULATION OF EXTREMITY: ICD-10-CM

## 2023-05-08 DIAGNOSIS — I87.2 VENOUS STASIS DERMATITIS OF LEFT LOWER EXTREMITY: ICD-10-CM

## 2023-05-08 DIAGNOSIS — M79.89 LEG SWELLING: ICD-10-CM

## 2023-05-08 DIAGNOSIS — Z79.4 DIABETES MELLITUS TYPE 2, INSULIN DEPENDENT (HCC): ICD-10-CM

## 2023-05-08 DIAGNOSIS — E11.9 DIABETES MELLITUS TYPE 2, INSULIN DEPENDENT (HCC): ICD-10-CM

## 2023-05-08 DIAGNOSIS — E11.65 UNCONTROLLED TYPE 2 DIABETES MELLITUS WITH HYPERGLYCEMIA (HCC): ICD-10-CM

## 2023-05-08 DIAGNOSIS — L03.116 CELLULITIS OF LEFT LOWER EXTREMITY: Primary | ICD-10-CM

## 2023-05-08 PROCEDURE — 99214 OFFICE O/P EST MOD 30 MIN: CPT | Performed by: FAMILY MEDICINE

## 2023-05-08 PROCEDURE — 3046F HEMOGLOBIN A1C LEVEL >9.0%: CPT | Performed by: FAMILY MEDICINE

## 2023-05-08 PROCEDURE — 1123F ACP DISCUSS/DSCN MKR DOCD: CPT | Performed by: FAMILY MEDICINE

## 2023-05-08 ASSESSMENT — PATIENT HEALTH QUESTIONNAIRE - PHQ9
SUM OF ALL RESPONSES TO PHQ QUESTIONS 1-9: 0
2. FEELING DOWN, DEPRESSED OR HOPELESS: 0
SUM OF ALL RESPONSES TO PHQ9 QUESTIONS 1 & 2: 0
SUM OF ALL RESPONSES TO PHQ QUESTIONS 1-9: 0
SUM OF ALL RESPONSES TO PHQ QUESTIONS 1-9: 0
1. LITTLE INTEREST OR PLEASURE IN DOING THINGS: 0
SUM OF ALL RESPONSES TO PHQ QUESTIONS 1-9: 0

## 2023-05-09 ENCOUNTER — HOSPITAL ENCOUNTER (OUTPATIENT)
Dept: WOUND CARE | Age: 83
Discharge: HOME | End: 2023-05-09
Payer: MEDICARE

## 2023-05-09 VITALS
TEMPERATURE: 97.5 F | HEART RATE: 75 BPM | BODY MASS INDEX: 37.18 KG/M2 | WEIGHT: 251 LBS | SYSTOLIC BLOOD PRESSURE: 113 MMHG | DIASTOLIC BLOOD PRESSURE: 76 MMHG | HEIGHT: 69 IN

## 2023-05-09 DIAGNOSIS — L97.522 CHRONIC ULCER OF LEFT FOOT WITH FAT LAYER EXPOSED (HCC): ICD-10-CM

## 2023-05-09 DIAGNOSIS — I70.245 ATHEROSCLEROSIS OF NATIVE ARTERY OF LEFT LOWER EXTREMITY WITH ULCERATION OF OTHER PART OF FOOT (HCC): ICD-10-CM

## 2023-05-09 DIAGNOSIS — L03.116 CELLULITIS OF LEG, LEFT: ICD-10-CM

## 2023-05-09 PROBLEM — I70.25 ATHEROSCLEROSIS OF NATIVE ARTERY OF EXTREMITY WITH ULCERATION (HCC): Chronic | Status: ACTIVE | Noted: 2023-05-09

## 2023-05-09 PROBLEM — I70.25 ATHEROSCLEROSIS OF NATIVE ARTERY OF EXTREMITY WITH ULCERATION (HCC): Status: ACTIVE | Noted: 2023-05-09

## 2023-05-09 PROCEDURE — 99213 OFFICE O/P EST LOW 20 MIN: CPT

## 2023-05-09 PROCEDURE — 99204 OFFICE O/P NEW MOD 45 MIN: CPT | Performed by: FAMILY MEDICINE

## 2023-05-09 RX ORDER — BACITRACIN, NEOMYCIN, POLYMYXIN B 400; 3.5; 5 [USP'U]/G; MG/G; [USP'U]/G
OINTMENT TOPICAL ONCE
OUTPATIENT
Start: 2023-05-09 | End: 2023-05-09

## 2023-05-09 RX ORDER — LIDOCAINE 40 MG/G
CREAM TOPICAL ONCE
OUTPATIENT
Start: 2023-05-09 | End: 2023-05-09

## 2023-05-09 RX ORDER — BACITRACIN ZINC AND POLYMYXIN B SULFATE 500; 1000 [USP'U]/G; [USP'U]/G
OINTMENT TOPICAL ONCE
OUTPATIENT
Start: 2023-05-09 | End: 2023-05-09

## 2023-05-09 RX ORDER — LIDOCAINE 50 MG/G
OINTMENT TOPICAL ONCE
OUTPATIENT
Start: 2023-05-09 | End: 2023-05-09

## 2023-05-09 RX ORDER — BETAMETHASONE DIPROPIONATE 0.05 %
OINTMENT (GRAM) TOPICAL ONCE
OUTPATIENT
Start: 2023-05-09 | End: 2023-05-09

## 2023-05-09 RX ORDER — GINSENG 100 MG
CAPSULE ORAL ONCE
OUTPATIENT
Start: 2023-05-09 | End: 2023-05-09

## 2023-05-09 RX ORDER — CLOBETASOL PROPIONATE 0.5 MG/G
OINTMENT TOPICAL ONCE
OUTPATIENT
Start: 2023-05-09 | End: 2023-05-09

## 2023-05-09 RX ORDER — GENTAMICIN SULFATE 1 MG/G
OINTMENT TOPICAL ONCE
OUTPATIENT
Start: 2023-05-09 | End: 2023-05-09

## 2023-05-09 RX ORDER — SULFAMETHOXAZOLE AND TRIMETHOPRIM 800; 160 MG/1; MG/1
1 TABLET ORAL 2 TIMES DAILY
Qty: 28 TABLET | Refills: 0 | Status: SHIPPED | OUTPATIENT
Start: 2023-05-09 | End: 2023-05-23

## 2023-05-09 RX ORDER — LIDOCAINE HYDROCHLORIDE 20 MG/ML
JELLY TOPICAL ONCE
OUTPATIENT
Start: 2023-05-09 | End: 2023-05-09

## 2023-05-09 RX ORDER — LIDOCAINE HYDROCHLORIDE 40 MG/ML
SOLUTION TOPICAL ONCE
OUTPATIENT
Start: 2023-05-09 | End: 2023-05-09

## 2023-05-09 ASSESSMENT — ENCOUNTER SYMPTOMS
EYE PAIN: 0
COUGH: 0
SORE THROAT: 0
SHORTNESS OF BREATH: 0
BLOOD IN STOOL: 0
ABDOMINAL DISTENTION: 0
COLOR CHANGE: 0
NAUSEA: 0
BLURRED VISION: 0
PHOTOPHOBIA: 0
ABDOMINAL PAIN: 0

## 2023-05-09 NOTE — PROGRESS NOTES
Daisy Pastor  1940 is a 78 y.o. male ,Established patient, here for evaluation of the following chief complaint(s):   Chief Complaint   Patient presents with    1 Month Follow-Up     Coming in go over sugar and to recheck his leg he does have appointment with wound  center tomorrow did get to er Friday and they but him on bactrim he has been taking it. 1. Cellulitis of left lower extremity--HE HAD PREVIOUSLY BEEN SENT TO WOUND CARE AND WILL SEE THEM TOMORROW  -     External Referral to Endocrinology  2. Venous stasis dermatitis of left lower extremity--per wound care. -     External Referral to Endocrinology  3. Leg swelling--GRADUAL IMPROVEMENT SEEN---CONTINUE BACTRIM AS LAB RESULTS ARE REVIEWED AND BACTERIA IS SENSITIVE.  -     External Referral to Endocrinology  4. Poor circulation of extremity  -     External Referral to Endocrinology  5. Uncontrolled type 2 diabetes mellitus with hyperglycemia ([facility]  -     External Referral to Endocrinology  6. Diabetes mellitus type 2, insulin dependent (HCC)--diabetes needs better control to allow for healing of the leg--INCREASE LEVIMIR TO 40 UNITS IN AM 35 IN PM  -     External Referral to Endocrinology    ELEVATED PSA--FOLLOWED BY UROLOGY    CKD 3---CREATININE 1.4 STABLE---ADVISE TO PUSH FLUIDS      Return in about 6 weeks (around 6/19/2023). Subjective   SUBJECTIVE/OBJECTIVE:  The patient was seen on may 5th and sent to ER when it was observed that his leg cellulitis was getting worse. He also has STASIS DERMATITIS and PERIPHERAL VASCULAR DISEASE. He was checked for blood clot and negative for dvt. ---procalcitonin was negative--- lactic acid was normal--- WAS PUT ON BACTRIM  AND CULTURE OF WOUND SHOWED KLEBSIELLA OXYTOCA WHICH WAS SENSITIVE TO BACTRIM. Leg Pain   The incident occurred more than 1 week ago. There was no injury mechanism. The pain is at a severity of 2/10. The pain is mild.  Pertinent negatives include no inability to bear

## 2023-05-09 NOTE — WOUND CARE
Discharge Instructions for  [place]  [address] [place] [address]  [address], [address]  Phone [phone]   Fax [phone]      NAME:  Sarah Quintanilla  YOB: 1940  MEDICAL RECORD NUMBER:  [id number]  DATE:  5/9/2023    Return Appointment:   1 week with Darline Triplett DO      Instructions: Left leg wound:  Cleanse with normal saline  Xeroform- apply to wound bed. Cover with ABD and rolled gauze  Change every other day and as needed  Apply tubigrip to Left lower leg wear daily day and night  Apply vaseline to Bilateral lower legs daily  Do not get wet in shower, may purchase cast cover for showering at local pharmacy  Elevate legs when sitting. Avoid prolonged standing or sitting with legs in dependent position. Increase protein intake to promote wound healing. Devan and Ensure are examples of protein supplements. Wound healing is greatly slowed when glucose levels are greater than 200. Monitor glucose levels to ensure tight glucose control. Minimum of 60 grams of protein per day    Home health to admit for dressing changes every other day      *Referral sent to Vascular team today  *Continue taking antibiotics, will send refill to your pharmacy, you may  today        Should you experience increased redness, swelling, pain, foul odor, size of wound(s), or have a temperature over 101 degrees please contact the [address] [place] [address] Road at [phone] or if after hours contact your primary care physician or go to the hospital emergency department. PLEASE NOTE: IF YOU ARE UNABLE TO OBTAIN WOUND SUPPLIES, CONTINUE TO USE THE SUPPLIES YOU HAVE AVAILABLE UNTIL YOU ARE ABLE TO REACH US. IT IS MOST IMPORTANT TO KEEP THE WOUND COVERED AT ALL TIMES.     Electronically signed Thais Cruz RN on 5/9/2023 at 10:55 AM

## 2023-05-09 NOTE — DISCHARGE INSTRUCTIONS
Discharge Instructions for  Carolynn [place]  [address] [place] [address] [address], [address]  Phone [phone]   Fax [phone]      NAME:  Ottoniel Jordan  YOB: 1940  MEDICAL RECORD NUMBER:  [id number]  DATE:  @ED@    Return Appointment:   1 week with Tj Leyva DO      Instructions:  Left leg wound:  Cleanse with normal saline  Xeroform- apply to wound bed. Cover with ABD and rolled gauze  Change every other day and as needed  Apply tubigrip to Left lower leg wear daily day and night  Apply vaseline to Bilateral lower legs daily  Do not get wet in shower, may purchase cast cover for showering at local pharmacy  Elevate legs when sitting. Avoid prolonged standing or sitting with legs in dependent position. Increase protein intake to promote wound healing. Devan and Ensure are examples of protein supplements. Wound healing is greatly slowed when glucose levels are greater than 200. Monitor glucose levels to ensure tight glucose control. Minimum of 60 grams of protein per day     Home health to admit for dressing changes every other day        *Referral sent to Vascular team today  *Continue taking antibiotics, will send refill to your pharmacy, you may  today              Should you experience increased redness, swelling, pain, foul odor, size of wound(s), or have a temperature over 101 degrees please contact the [address] at [phone] or if after hours contact your primary care physician or go to the hospital emergency department. PLEASE NOTE: IF YOU ARE UNABLE TO OBTAIN WOUND SUPPLIES, CONTINUE TO USE THE SUPPLIES YOU HAVE AVAILABLE UNTIL YOU ARE ABLE TO REACH US. IT IS MOST IMPORTANT TO KEEP THE WOUND COVERED AT ALL TIMES.     Electronically signed Mukul Sosa RN on 5/9/2023 at 11:23 AM

## 2023-05-09 NOTE — FLOWSHEET NOTE
05/09/23 1043   Left Leg Edema Point of Measurement   Leg circumference 49 cm   Ankle circumference 24 cm   Foot circumference 26 cm   Compression Therapy Compression ordered   Peripheral Vascular   RLE Edema +3   Wound 05/09/23 Pretibial Distal;Left circumferential   Date First Assessed/Time First Assessed: 05/09/23 1043   Present on Hospital Admission: Yes  Wound Approximate Age at First Assessment (Weeks): 2 weeks  Primary Wound Type: Venous Ulcer  Location: Pretibial  Wound Location Orientation: Distal;Left  Wo. ..    Wound Image     Wound Etiology Venous   Dressing Status Intact   Wound Cleansed Soap and water   Dressing/Treatment Open to air   Offloading for Diabetic Foot Ulcers Offloading ordered   Wound Length (cm) 20 cm   Wound Width (cm) 31 cm   Wound Depth (cm) 0.1 cm   Wound Surface Area (cm^2) 620 cm^2   Wound Volume (cm^3) 62 cm^3   Wound Assessment Eschar dry;Pink/red   Drainage Amount Moderate   Drainage Description Serous   Odor Mild   Anjana-wound Assessment Edematous   Wound Thickness Description not for Pressure Injury Full thickness     Patient is currently taking xarelto daily

## 2023-05-12 LAB
BACTERIA SPEC CULT: NORMAL
SERVICE CMNT-IMP: NORMAL

## 2023-05-16 ENCOUNTER — HOSPITAL ENCOUNTER (OUTPATIENT)
Dept: WOUND CARE | Age: 83
Discharge: HOME | End: 2023-05-16
Payer: MEDICARE

## 2023-05-16 VITALS
SYSTOLIC BLOOD PRESSURE: 128 MMHG | WEIGHT: 253 LBS | HEART RATE: 72 BPM | DIASTOLIC BLOOD PRESSURE: 64 MMHG | HEIGHT: 69 IN | TEMPERATURE: 98 F | RESPIRATION RATE: 18 BRPM | BODY MASS INDEX: 37.47 KG/M2

## 2023-05-16 PROCEDURE — 99213 OFFICE O/P EST LOW 20 MIN: CPT

## 2023-05-16 RX ORDER — LIDOCAINE HYDROCHLORIDE 20 MG/ML
JELLY TOPICAL ONCE
OUTPATIENT
Start: 2023-05-16 | End: 2023-05-16

## 2023-05-16 RX ORDER — LIDOCAINE 40 MG/G
CREAM TOPICAL ONCE
OUTPATIENT
Start: 2023-05-16 | End: 2023-05-16

## 2023-05-16 RX ORDER — GENTAMICIN SULFATE 1 MG/G
OINTMENT TOPICAL ONCE
OUTPATIENT
Start: 2023-05-16 | End: 2023-05-16

## 2023-05-16 RX ORDER — GINSENG 100 MG
CAPSULE ORAL ONCE
OUTPATIENT
Start: 2023-05-16 | End: 2023-05-16

## 2023-05-16 RX ORDER — BACITRACIN, NEOMYCIN, POLYMYXIN B 400; 3.5; 5 [USP'U]/G; MG/G; [USP'U]/G
OINTMENT TOPICAL ONCE
OUTPATIENT
Start: 2023-05-16 | End: 2023-05-16

## 2023-05-16 RX ORDER — LIDOCAINE HYDROCHLORIDE 40 MG/ML
SOLUTION TOPICAL ONCE
OUTPATIENT
Start: 2023-05-16 | End: 2023-05-16

## 2023-05-16 RX ORDER — CLOBETASOL PROPIONATE 0.5 MG/G
OINTMENT TOPICAL ONCE
OUTPATIENT
Start: 2023-05-16 | End: 2023-05-16

## 2023-05-16 RX ORDER — LIDOCAINE 50 MG/G
OINTMENT TOPICAL ONCE
OUTPATIENT
Start: 2023-05-16 | End: 2023-05-16

## 2023-05-16 RX ORDER — BETAMETHASONE DIPROPIONATE 0.05 %
OINTMENT (GRAM) TOPICAL ONCE
OUTPATIENT
Start: 2023-05-16 | End: 2023-05-16

## 2023-05-16 RX ORDER — BACITRACIN ZINC AND POLYMYXIN B SULFATE 500; 1000 [USP'U]/G; [USP'U]/G
OINTMENT TOPICAL ONCE
OUTPATIENT
Start: 2023-05-16 | End: 2023-05-16

## 2023-05-16 NOTE — WOUND CARE
Discharge Instructions for  Carolynn Theodore  [address], [address]  Phone [phone]   Fax [phone]      NAME:  Nava See  YOB: 1940  MEDICAL RECORD NUMBER:  [id number]  DATE:  5/16/2023    Return Appointment:   1 week with Nely Sales DO      Instructions: Left leg wound:  Cleanse with normal saline  Xeroform- apply to wound bed. Cover with ABD and rolled gauze  Change every other day and as needed  Apply tubigrip to Left lower leg wear daily day and night  Apply vaseline to Bilateral lower legs daily. Take shower before Home Health changes dressing. Elevate legs when sitting. Avoid prolonged standing or sitting with legs in dependent position. Increase protein intake to promote wound healing. Devan and Ensure are examples of protein supplements. Wound healing is greatly slowed when glucose levels are greater than 200. Monitor glucose levels to ensure tight glucose control. Home health to admit for dressing changes every other day      *Appointment with Vascular Thursday 5-18  *Continue taking antibiotics    Please increase dietary protein to at least 60 grams per day to support cell rejuvenation. May use supplements such as Ensure Max, Devan, & Glucerna (samples & coupons provided at this visit). Be sure to spread intake throughout the day for improved absorption. Should you experience increased redness, swelling, pain, foul odor, size of wound(s), or have a temperature over 101 degrees please contact the [address] [place] [address] Road at [phone] or if after hours contact your primary care physician or go to the hospital emergency department. PLEASE NOTE: IF YOU ARE UNABLE TO OBTAIN WOUND SUPPLIES, CONTINUE TO USE THE SUPPLIES YOU HAVE AVAILABLE UNTIL YOU ARE ABLE TO REACH US. IT IS MOST IMPORTANT TO KEEP THE WOUND COVERED AT ALL TIMES.     Electronically signed Saranya Santos RN on 5/16/2023 at 11:06 AM

## 2023-05-16 NOTE — FLOWSHEET NOTE
05/16/23 1022   Left Leg Edema Point of Measurement   Leg circumference 44.5 cm   Ankle circumference 27 cm   Foot circumference 25.5 cm   Compression Therapy Tubular elastic support bandage   Wound 05/09/23 Pretibial Distal;Left circumferential   Date First Assessed/Time First Assessed: 05/09/23 1043   Present on Hospital Admission: Yes  Wound Approximate Age at First Assessment (Weeks): 2 weeks  Primary Wound Type: Venous Ulcer  Location: Pretibial  Wound Location Orientation: Distal;Left  Wo. ..    Wound Image    Wound Etiology Venous   Dressing Status Intact   Wound Cleansed Soap and water   Dressing/Treatment Xeroform   Offloading for Diabetic Foot Ulcers Offloading ordered   Wound Length (cm) 20 cm   Wound Width (cm) 31 cm   Wound Depth (cm) 0 cm   Wound Surface Area (cm^2) 620 cm^2   Change in Wound Size % (l*w) 0   Wound Volume (cm^3) 0 cm^3   Wound Healing % 100   Wound Assessment Eschar dry   Drainage Amount None   Anjana-wound Assessment Edematous   Wound Thickness Description not for Pressure Injury Full thickness   Pain Assessment   Pain Assessment None - Denies Pain     Patient taking Xarelto

## 2023-05-18 ENCOUNTER — OFFICE VISIT (OUTPATIENT)
Dept: VASCULAR SURGERY | Age: 83
End: 2023-05-18
Payer: MEDICARE

## 2023-05-18 VITALS
WEIGHT: 250 LBS | HEART RATE: 84 BPM | DIASTOLIC BLOOD PRESSURE: 76 MMHG | HEIGHT: 69 IN | SYSTOLIC BLOOD PRESSURE: 123 MMHG | BODY MASS INDEX: 37.03 KG/M2 | OXYGEN SATURATION: 95 %

## 2023-05-18 DIAGNOSIS — I73.9 PAD (PERIPHERAL ARTERY DISEASE) (HCC): ICD-10-CM

## 2023-05-18 DIAGNOSIS — I89.0 LYMPHEDEMA OF BOTH LOWER EXTREMITIES: Primary | ICD-10-CM

## 2023-05-18 PROCEDURE — 1123F ACP DISCUSS/DSCN MKR DOCD: CPT | Performed by: STUDENT IN AN ORGANIZED HEALTH CARE EDUCATION/TRAINING PROGRAM

## 2023-05-18 PROCEDURE — 99204 OFFICE O/P NEW MOD 45 MIN: CPT | Performed by: STUDENT IN AN ORGANIZED HEALTH CARE EDUCATION/TRAINING PROGRAM

## 2023-05-18 NOTE — PROGRESS NOTES
[address]   [address] [facility]. [address] FAX: [phone]    Nava See  : 1940      Reason for visit: Bilateral lower extremity peripheral arterial disease and venous stasis wounds    Chief Complaint: 78 y.o. male with left lower extremity wounds secondary to significant edema from mixed lymphedema. Patient has been evaluated by wound care clinic under good treatment since beginning of this month. Wound imaging reviewed and appears to be significantly improved with near complete healing. Patient continues to have bilateral lower extremity edema. Patient has not tried compression therapy with vascular surgery consult to evaluate arterial supply. Duplex ultrasound with right SFA stenosis with monophasic tibial flow. Left lower extremity with triphasic flow through posterior tibial artery and anterior tibial artery occluded with monophasic flow in the peroneal artery. Plan:   Peripheral arterial disease: Continue Crestor. Start ASA 81mg qd. Mixed lymphedema wounds:  Multiphasic for left posterior tibial artery, will prescribe bilateral lower extremity zip compression stockings. Recommend elevation bilateral lower extremities above heart. Continue wound care as directed by wound care clinic. If wound persist or reoccur would precede with venous reflux studies. Level 4 and Progression of chronic illness    Ambulatory status: Without claudication    Physical Examination:   Height: 5' 9\" (1.753 m), Weight - Scale: 250 lb (113.4 kg), BP: 123/76    General:Well-developed. No distress. HENT: AT  CV: Regular rhythm. LUNG: Effort normal and breath sounds normal. No respiratory distress. Abdominal: non-distended  Extremities:Bilateral lower extremity edema with 1cm x 1cm left anterior tibial wound with clean base without surrounding erythema. Serous drainage.    Vascular: Radial:, L, 2+ , R, 2+  , Femoral:, L, 2+ , R, 2+  , DP:, L, 2+ , R, 2+  , PT:, L, Dop

## 2023-05-24 ENCOUNTER — HOSPITAL ENCOUNTER (OUTPATIENT)
Dept: WOUND CARE | Age: 83
Discharge: HOME | End: 2023-05-24
Payer: MEDICARE

## 2023-05-24 VITALS
BODY MASS INDEX: 37.62 KG/M2 | HEIGHT: 69 IN | DIASTOLIC BLOOD PRESSURE: 65 MMHG | HEART RATE: 72 BPM | RESPIRATION RATE: 18 BRPM | WEIGHT: 254 LBS | SYSTOLIC BLOOD PRESSURE: 106 MMHG | TEMPERATURE: 98.3 F

## 2023-05-24 DIAGNOSIS — L97.522 CHRONIC ULCER OF LEFT FOOT WITH FAT LAYER EXPOSED (HCC): Primary | ICD-10-CM

## 2023-05-24 PROCEDURE — 29580 STRAPPING UNNA BOOT: CPT

## 2023-05-24 RX ORDER — BACITRACIN ZINC AND POLYMYXIN B SULFATE 500; 1000 [USP'U]/G; [USP'U]/G
OINTMENT TOPICAL ONCE
OUTPATIENT
Start: 2023-05-24 | End: 2023-05-24

## 2023-05-24 RX ORDER — LIDOCAINE HYDROCHLORIDE 20 MG/ML
JELLY TOPICAL ONCE
OUTPATIENT
Start: 2023-05-24 | End: 2023-05-24

## 2023-05-24 RX ORDER — CLOBETASOL PROPIONATE 0.5 MG/G
OINTMENT TOPICAL ONCE
OUTPATIENT
Start: 2023-05-24 | End: 2023-05-24

## 2023-05-24 RX ORDER — BACITRACIN, NEOMYCIN, POLYMYXIN B 400; 3.5; 5 [USP'U]/G; MG/G; [USP'U]/G
OINTMENT TOPICAL ONCE
OUTPATIENT
Start: 2023-05-24 | End: 2023-05-24

## 2023-05-24 RX ORDER — LIDOCAINE 50 MG/G
OINTMENT TOPICAL ONCE
OUTPATIENT
Start: 2023-05-24 | End: 2023-05-24

## 2023-05-24 RX ORDER — BETAMETHASONE DIPROPIONATE 0.05 %
OINTMENT (GRAM) TOPICAL ONCE
OUTPATIENT
Start: 2023-05-24 | End: 2023-05-24

## 2023-05-24 RX ORDER — LIDOCAINE 40 MG/G
CREAM TOPICAL ONCE
OUTPATIENT
Start: 2023-05-24 | End: 2023-05-24

## 2023-05-24 RX ORDER — ALFUZOSIN HYDROCHLORIDE 10 MG/1
TABLET, EXTENDED RELEASE ORAL
COMMUNITY
Start: 2023-05-23

## 2023-05-24 RX ORDER — GINSENG 100 MG
CAPSULE ORAL ONCE
OUTPATIENT
Start: 2023-05-24 | End: 2023-05-24

## 2023-05-24 RX ORDER — GENTAMICIN SULFATE 1 MG/G
OINTMENT TOPICAL ONCE
OUTPATIENT
Start: 2023-05-24 | End: 2023-05-24

## 2023-05-24 RX ORDER — LIDOCAINE HYDROCHLORIDE 40 MG/ML
SOLUTION TOPICAL ONCE
OUTPATIENT
Start: 2023-05-24 | End: 2023-05-24

## 2023-05-24 NOTE — WOUND CARE
[place] Application   Below Knee    NAME:  Bertha Alonso  YOB: 1940  MEDICAL RECORD NUMBER:  [id number]  DATE:  5/24/2023    Fabienne barrios: Applied moisturizing agent to dry skin as needed. Applied Unna roll from toes to knee overlapping each time. Applied ace wrap or coban from toes to below the knee. Secured with tape and/or metal clips covered with tape. Instructed patient/caregiver to keep dressing dry and intact. DO NOT REMOVE DRESSING. Instructed pt/family/caregiver to report excessive draining, loose bandage, wet dressing, severe pain or tingling in toes. Applied [place] dressing below the knee to right lower leg. Applied [place] dressing below the knee to left lower leg. Unna Boot(s) were applied per  Guidelines.      Electronically signed by Peter Gonzalez RN on 5/24/2023 at 2:14 PM

## 2023-05-24 NOTE — WOUND CARE
Discharge Instructions for  [address] Theodore  [address] [place] [address]  [address], [address]  Phone [phone]   Fax [phone]      NAME:  Yamile Whitten  YOB: 1940  MEDICAL RECORD NUMBER:  [id number]  DATE:  5/24/2023    Return Appointment:   1 week with Camila Osgood, DO      Instructions: Bilateral lower legs  Cleanse with normal saline  Apply vasoline to bilateral legs. Unna boot with wound and lower extremity assessment. If there is any problem with the dressing (too tight, slides down,, etc.)  Patient to return to clinic to have re-wrapped by clinician. Change 3x weekly. Elevate legs when sitting. Avoid prolonged standing or sitting with legs in dependent position. Increase protein intake to promote wound healing. Devan and Ensure are examples of protein supplements. Wound healing is greatly slowed when glucose levels are greater than 200. Monitor glucose levels to ensure tight glucose control. Home health to change on Friday and Monday. At next visit, will plan to discharge home health and order [facility] ORTHO. Please increase dietary protein to at least 60 grams per day to support cell rejuvenation. May use supplements such as Ensure Max, Devan, & Glucerna (samples & coupons provided at this visit). Be sure to spread intake throughout the day for improved absorption. Should you experience increased redness, swelling, pain, foul odor, size of wound(s), or have a temperature over 101 degrees please contact the [address] at [phone] or if after hours contact your primary care physician or go to the hospital emergency department. PLEASE NOTE: IF YOU ARE UNABLE TO OBTAIN WOUND SUPPLIES, CONTINUE TO USE THE SUPPLIES YOU HAVE AVAILABLE UNTIL YOU ARE ABLE TO REACH US. IT IS MOST IMPORTANT TO KEEP THE WOUND COVERED AT ALL TIMES.     Electronically signed Kaitlin Ortiz RN on 5/24/2023 at 1:54 PM

## 2023-05-24 NOTE — DISCHARGE INSTRUCTIONS
Discharge Instructions for  Carolynn Theodore  [address] [place] [address]  [address], [address]  Phone [phone]   Fax [phone]      NAME:  Rancho Wood  YOB: 1940  MEDICAL RECORD NUMBER:  [id number]  DATE:  @ED@    Return Appointment:   1 week with Liss Shah, DO      Instructions: Bilateral lower legs  Cleanse with normal saline  Apply vasoline to bilateral legs. Unna boot with wound and lower extremity assessment. If there is any problem with the dressing (too tight, slides down,, etc.)  Patient to return to clinic to have re-wrapped by clinician. Change 3x weekly. Elevate legs when sitting. Avoid prolonged standing or sitting with legs in dependent position. Increase protein intake to promote wound healing. Devan and Ensure are examples of protein supplements. Wound healing is greatly slowed when glucose levels are greater than 200. Monitor glucose levels to ensure tight glucose control. Home health to change on Friday and Monday. At next visit, will plan to discharge home health and order [facility] ORTHO. Please increase dietary protein to at least 60 grams per day to support cell rejuvenation. May use supplements such as Ensure Max, Devan, & Glucerna (samples & coupons provided at this visit). Be sure to spread intake throughout the day for improved absorption. Should you experience increased redness, swelling, pain, foul odor, size of wound(s), or have a temperature over 101 degrees please contact the [address] at [phone] or if after hours contact your primary care physician or go to the hospital emergency department. PLEASE NOTE: IF YOU ARE UNABLE TO OBTAIN WOUND SUPPLIES, CONTINUE TO USE THE SUPPLIES YOU HAVE AVAILABLE UNTIL YOU ARE ABLE TO REACH US. IT IS MOST IMPORTANT TO KEEP THE WOUND COVERED AT ALL TIMES.     Electronically signed Diomedes Napier RN on 5/24/2023 at 2:00 PM

## 2023-05-24 NOTE — FLOWSHEET NOTE
05/24/23 1338   Right Leg Edema Point of Measurement   Leg circumference 40 cm   Ankle circumference 23 cm   Foot circumference 23.5 cm   Compression Therapy Tubular elastic support bandage   Left Leg Edema Point of Measurement   Leg circumference 41.5 cm   Ankle circumference 23 cm   Foot circumference 24 cm   Compression Therapy Tubular elastic support bandage   Wound 05/09/23 Pretibial Distal;Left circumferential   Date First Assessed/Time First Assessed: 05/09/23 1043   Present on Hospital Admission: Yes  Wound Approximate Age at First Assessment (Weeks): 2 weeks  Primary Wound Type: Venous Ulcer  Location: Pretibial  Wound Location Orientation: Distal;Left  Wo. .. Wound Image    Wound Etiology Venous   Dressing Status Intact   Wound Cleansed Soap and water   Wound Length (cm) 1 cm   Wound Width (cm) 1 cm   Wound Depth (cm) 0.1 cm   Wound Surface Area (cm^2) 1 cm^2   Change in Wound Size % (l*w) 99.84   Wound Volume (cm^3) 0.1 cm^3   Wound Healing % 100   Wound Assessment Eschar dry   Drainage Amount Scant   Drainage Description Serosanguinous   Anjana-wound Assessment Edematous   Wound Thickness Description not for Pressure Injury Partial thickness   Wound 05/24/23 Pretibial Right #2 right lower leg   Date First Assessed/Time First Assessed: 05/24/23 1338   Present on Hospital Admission: Yes  Wound Approximate Age at First Assessment (Weeks): 1 weeks  Primary Wound Type: Venous Ulcer  Location: Pretibial  Wound Location Orientation: Right  Wound De. ..    Wound Image    Wound Etiology Venous   Wound Cleansed Soap and water   Wound Length (cm) 5 cm   Wound Width (cm) 5 cm   Wound Depth (cm) 0.1 cm   Wound Surface Area (cm^2) 25 cm^2   Wound Volume (cm^3) 2.5 cm^3   Drainage Amount Scant   Drainage Description Serosanguinous   Odor None   Wound Thickness Description not for Pressure Injury Partial thickness     Patient is on xarelto daily

## 2023-06-01 ENCOUNTER — HOSPITAL ENCOUNTER (OUTPATIENT)
Dept: WOUND CARE | Age: 83
Discharge: HOME | End: 2023-06-01
Payer: MEDICARE

## 2023-06-01 VITALS
DIASTOLIC BLOOD PRESSURE: 74 MMHG | BODY MASS INDEX: 35.84 KG/M2 | SYSTOLIC BLOOD PRESSURE: 134 MMHG | WEIGHT: 242 LBS | HEIGHT: 69 IN | HEART RATE: 84 BPM

## 2023-06-01 PROCEDURE — 99213 OFFICE O/P EST LOW 20 MIN: CPT

## 2023-06-01 PROCEDURE — 99213 OFFICE O/P EST LOW 20 MIN: CPT | Performed by: FAMILY MEDICINE

## 2023-06-01 NOTE — PROGRESS NOTES
[facility]   Medical Staff Progress Note     [address] [facility] RECORD NUMBER:  [id number]  AGE: 78 y.o. GENDER: male  : 1940  EPISODE DATE:  2023    Chief complaint and reason for visit:     Recent left leg cellulitis with bilateral chronic wounds     Patient presenting for follow up evaluation of wound(s) per chief complaint. Subjective and ROS: Symptoms, wound related issues, or other pertinent wound history since last visit: no new wound care issues. Tolerating current treatment. No systemic complaints above baseline. History of Wound Context: Per original history and physical on this patient. Changes in history since last evaluation: none    Medical Decision Making:     Problem List Items Addressed This Visit    None      Wounds and Treatment Plan:  Cellulitis is resolved. Wounds are small. Edema is better. We will discontinue home health. Use dual layer Tubigrip in order Velcro style compression stockings     Return Appointment:   1 week with Jim Colon DO        Instructions: BLE:  Cleanse with soap and water  Apply vaseline daily after showering  Wear double layer tubigrip daily  Until Velcro wraps arrive     Bring velcro wraps to next visit   Discontinue home health at this time  Other associated diagnoses or problems addressed:  Chronic venous hypertension    Pertinent imaging reviewed including independent interpretation include:  None    Pertinent labs reviewed. Medical records and review of external note (s) from other providers done as well. New lab or imaging orders placed:  None     Prescription drug management: N/A     Discussion of management or test interpretation with another provider. Comorbid conditions affecting wound healing: As per PMH which was reviewed. Risk of complications and/or mortality of patient management:   This patient has a low risk of morbidity and mortality

## 2023-06-01 NOTE — DISCHARGE INSTRUCTIONS
Discharge Instructions for  Carolynn Theodore  [address] [place] [address], [address] [place], [place] [address]  Phone [phone]   Fax [phone]      NAME:  Morris Damian  YOB: 1940  MEDICAL RECORD NUMBER:  [id number]  DATE:  @ED@    Return Appointment:   1 week with Florentino Morris DO      Instructions:  BLE:  Cleanse with soap and water  Apply vaseline daily after showering  Wear double layer tubigrip daily  Until Velcro wraps arrive     Bring velcro wraps to next visit   [address] [place] [address] home health at this time           Should you experience increased redness, swelling, pain, foul odor, size of wound(s), or have a temperature over 101 degrees please contact the [address] [place] [address] Road at [phone] or if after hours contact your primary care physician or go to the hospital emergency department. PLEASE NOTE: IF YOU ARE UNABLE TO OBTAIN WOUND SUPPLIES, CONTINUE TO USE THE SUPPLIES YOU HAVE AVAILABLE UNTIL YOU ARE ABLE TO REACH US. IT IS MOST IMPORTANT TO KEEP THE WOUND COVERED AT ALL TIMES.     Electronically signed Nicky Nelson RN on 6/1/2023 at 1:48 PM

## 2023-06-01 NOTE — WOUND CARE
Discharge Instructions for  Carolynn Theodore  [address] [place] [address]  [address], [address]  Phone [phone]   Fax [phone]      NAME:  Victoriano Ramirez  YOB: 1940  MEDICAL RECORD NUMBER:  [id number]  DATE:  6/1/2023    Return Appointment:   1 week with Ritchie Boas, DO      Instructions: BLE:  Cleanse with soap and water  Apply vaseline daily after showering  Wear double layer tubigrip daily  Until Velcro wraps arrive    Bring velcro wraps to next visit   [address] [place] [address] home health at this time        Should you experience increased redness, swelling, pain, foul odor, size of wound(s), or have a temperature over 101 degrees please contact the [address] [place] [address] Road at [phone] or if after hours contact your primary care physician or go to the hospital emergency department. PLEASE NOTE: IF YOU ARE UNABLE TO OBTAIN WOUND SUPPLIES, CONTINUE TO USE THE SUPPLIES YOU HAVE AVAILABLE UNTIL YOU ARE ABLE TO REACH US. IT IS MOST IMPORTANT TO KEEP THE WOUND COVERED AT ALL TIMES.     Electronically signed Griselda Reynoso RN on 6/1/2023 at 1:35 PM

## 2023-06-01 NOTE — FLOWSHEET NOTE
06/01/23 1333   Right Leg Edema Point of Measurement   Leg circumference 39 cm   Ankle circumference 24 cm   Foot circumference 23 cm   Compression Therapy Tubular elastic support bandage   Left Leg Edema Point of Measurement   Leg circumference 39 cm   Ankle circumference 22 cm   Foot circumference 23 cm   Compression Therapy Unna boot   Wound 05/24/23 Pretibial Right #2 right lower leg   Date First Assessed/Time First Assessed: 05/24/23 1338   Present on Hospital Admission: Yes  Wound Approximate Age at First Assessment (Weeks): 1 weeks  Primary Wound Type: Venous Ulcer  Location: Pretibial  Wound Location Orientation: Right  Wound De. .. Wound Image    Wound Etiology Venous   Dressing Status Intact   Wound Cleansed Soap and water   Wound Length (cm) 0.1 cm   Wound Width (cm) 0.1 cm   Wound Depth (cm) 0.1 cm   Wound Surface Area (cm^2) 0.01 cm^2   Change in Wound Size % (l*w) 99.96   Wound Volume (cm^3) 0.001 cm^3   Wound Healing % 100   Wound Assessment Pink/red   Drainage Amount Small   Drainage Description Serosanguinous   Odor None   Wound Thickness Description not for Pressure Injury Partial thickness   Wound 05/09/23 Pretibial Distal;Left circumferential   Date First Assessed/Time First Assessed: 05/09/23 1043   Present on Hospital Admission: Yes  Wound Approximate Age at First Assessment (Weeks): 2 weeks  Primary Wound Type: Venous Ulcer  Location: Pretibial  Wound Location Orientation: Distal;Left  Wo. ..    Wound Image    Wound Etiology Venous   Dressing Status Intact   Wound Cleansed Soap and water   Wound Length (cm) 0.1 cm   Wound Width (cm) 0.1 cm   Wound Depth (cm) 0.1 cm   Wound Surface Area (cm^2) 0.01 cm^2   Change in Wound Size % (l*w) 100   Wound Volume (cm^3) 0.001 cm^3   Wound Healing % 100   Wound Assessment Pink/red   Drainage Amount Small   Drainage Description Serosanguinous   Odor None   Wound Thickness Description not for Pressure Injury Partial thickness

## 2023-06-02 ENCOUNTER — TELEPHONE (OUTPATIENT)
Dept: WOUND CARE | Age: 83
End: 2023-06-02

## 2023-06-02 DIAGNOSIS — L97.522 CHRONIC ULCER OF LEFT FOOT WITH FAT LAYER EXPOSED (HCC): Primary | Chronic | ICD-10-CM

## 2023-06-02 NOTE — TELEPHONE ENCOUNTER
Patient calls today reporting pain in bilateral feet that started last night. States that pain subsides when he is walking. Per Dr. Lazarus Place, may remove tubigrip socks at night and reapply in the morning. If he continues to have pain, decrease to 1 layer of tubigrip. Patient also encouraged to keep appt with vascular dr next week. Patient verbalized understanding.

## 2023-06-08 ENCOUNTER — HOSPITAL ENCOUNTER (OUTPATIENT)
Dept: WOUND CARE | Age: 83
Discharge: HOME | End: 2023-06-08
Payer: MEDICARE

## 2023-06-08 VITALS — BODY MASS INDEX: 36.58 KG/M2 | WEIGHT: 247 LBS | HEIGHT: 69 IN

## 2023-06-08 PROCEDURE — 99213 OFFICE O/P EST LOW 20 MIN: CPT

## 2023-06-08 PROCEDURE — 99213 OFFICE O/P EST LOW 20 MIN: CPT | Performed by: FAMILY MEDICINE

## 2023-06-08 RX ORDER — CEPHALEXIN 500 MG/1
500 CAPSULE ORAL 4 TIMES DAILY
Qty: 40 CAPSULE | Refills: 0 | Status: SHIPPED | OUTPATIENT
Start: 2023-06-08 | End: 2023-06-18

## 2023-06-08 NOTE — PROGRESS NOTES
regarding these disease processes pertinent to wound(s). Other pertinent decisions include: minor surgery or procedures as below. The patient's diagnosis or treatment is  significantly limited by social determinants of health as noted by: nutritional resource limitations .     Wound 05/09/23 Pretibial Distal;Left circumferential (Active)   Wound Image   06/08/23 1422   Wound Etiology Venous 06/08/23 1422   Dressing Status Intact 06/08/23 1422   Wound Cleansed Soap and water 06/08/23 1422   Dressing/Treatment Xeroform 05/16/23 1022   Offloading for Diabetic Foot Ulcers Offloading ordered 05/16/23 1022   Wound Length (cm) 0 cm 06/08/23 1422   Wound Width (cm) 0 cm 06/08/23 1422   Wound Depth (cm) 0 cm 06/08/23 1422   Wound Surface Area (cm^2) 0 cm^2 06/08/23 1422   Change in Wound Size % (l*w) 100 06/08/23 1422   Wound Volume (cm^3) 0 cm^3 06/08/23 1422   Wound Healing % 100 06/08/23 1422   Wound Assessment Epithelialization 06/08/23 1422   Drainage Amount None 06/08/23 1422   Drainage Description Serosanguinous 06/01/23 1333   Odor None 06/08/23 1422   Anjana-wound Assessment Edematous 05/24/23 1338   Wound Thickness Description not for Pressure Injury Partial thickness 06/01/23 1333   Number of days: 30       Wound 05/24/23 Pretibial Right #2 right lower leg (Active)   Wound Image   06/08/23 1422   Wound Etiology Venous 06/08/23 1422   Dressing Status Intact 06/08/23 1422   Wound Cleansed Soap and water 06/08/23 1422   Wound Length (cm) 0 cm 06/08/23 1422   Wound Width (cm) 0 cm 06/08/23 1422   Wound Depth (cm) 0 cm 06/08/23 1422   Wound Surface Area (cm^2) 0 cm^2 06/08/23 1422   Change in Wound Size % (l*w) 100 06/08/23 1422   Wound Volume (cm^3) 0 cm^3 06/08/23 1422   Wound Healing % 100 06/08/23 1422   Wound Assessment Epithelialization 06/08/23 1422   Drainage Amount None 06/08/23 1422   Drainage Description Serosanguinous 06/01/23 1333   Odor None 06/08/23 1422   Wound Thickness Description not for Pressure

## 2023-06-08 NOTE — FLOWSHEET NOTE
06/08/23 1422   Right Leg Edema Point of Measurement   Leg circumference 40 cm   Ankle circumference 24 cm   Foot circumference 24 cm   Compression Therapy Tubular elastic support bandage   Left Leg Edema Point of Measurement   Leg circumference 45 cm   Ankle circumference 24 cm   Foot circumference 25 cm   Compression Therapy Tubular elastic support bandage   Wound 05/24/23 Pretibial Right #2 right lower leg   Date First Assessed/Time First Assessed: 05/24/23 1338   Present on Hospital Admission: Yes  Wound Approximate Age at First Assessment (Weeks): 1 weeks  Primary Wound Type: Venous Ulcer  Location: Pretibial  Wound Location Orientation: Right  Wound De. .. Wound Image    Wound Etiology Venous   Dressing Status Intact   Wound Cleansed Soap and water   Wound Length (cm) 0 cm   Wound Width (cm) 0 cm   Wound Depth (cm) 0 cm   Wound Surface Area (cm^2) 0 cm^2   Change in Wound Size % (l*w) 100   Wound Volume (cm^3) 0 cm^3   Wound Healing % 100   Wound Assessment Epithelialization   Drainage Amount None   Odor None   Wound 05/09/23 Pretibial Distal;Left circumferential   Date First Assessed/Time First Assessed: 05/09/23 1043   Present on Hospital Admission: Yes  Wound Approximate Age at First Assessment (Weeks): 2 weeks  Primary Wound Type: Venous Ulcer  Location: Pretibial  Wound Location Orientation: Distal;Left  Wo. ..    Wound Image    Wound Etiology Venous   Dressing Status Intact   Wound Cleansed Soap and water   Wound Length (cm) 0 cm   Wound Width (cm) 0 cm   Wound Depth (cm) 0 cm   Wound Surface Area (cm^2) 0 cm^2   Change in Wound Size % (l*w) 100   Wound Volume (cm^3) 0 cm^3   Wound Healing % 100   Wound Assessment Epithelialization   Drainage Amount None   Odor None

## 2023-06-08 NOTE — WOUND CARE
Discharge Instructions for  Carolynn Theodore  [address] [facility] [address]  [address] Traci Howell, [address]  Phone [phone]   Fax [phone]      NAME:  Annika Loco  YOB: 1940  MEDICAL RECORD NUMBER:  [id number]  DATE:  6/8/2023    Return Appointment:   1 week with Amelie Gonzalez DO      Instructions: BLE:  Cleanse with soap and water  Apply vaseline to entire leg  Wear velcro wraps daily   May remove at night      Antibiotic sent to your pharmacy today,   ASAP and start taking immediately         Should you experience increased redness, swelling, pain, foul odor, size of wound(s), or have a temperature over 101 degrees please contact the [address] [place] [address] Road at [phone] or if after hours contact your primary care physician or go to the hospital emergency department. PLEASE NOTE: IF YOU ARE UNABLE TO OBTAIN WOUND SUPPLIES, CONTINUE TO USE THE SUPPLIES YOU HAVE AVAILABLE UNTIL YOU ARE ABLE TO REACH US. IT IS MOST IMPORTANT TO KEEP THE WOUND COVERED AT ALL TIMES.     Electronically signed Aliya Rivers RN on 6/8/2023 at 2:27 PM

## 2023-06-08 NOTE — DISCHARGE INSTRUCTIONS
Discharge Instructions for  Carolynn Theodore  [address] [place] [address] Traci Howell, [address]  Phone [phone]   Fax [phone]      NAME:  Luis David  YOB: 1940  MEDICAL RECORD NUMBER:  [id number]  DATE:  @ED@    Return Appointment:   1 week with John Oviedo, DO      Instructions: BLE:  Cleanse with soap and water  Apply vaseline to entire leg  Wear velcro wraps daily   May remove at night        Antibiotic sent to your pharmacy today,   ASAP and start taking immediately            Should you experience increased redness, swelling, pain, foul odor, size of wound(s), or have a temperature over 101 degrees please contact the [address] [place] [address] Road at [phone] or if after hours contact your primary care physician or go to the hospital emergency department. PLEASE NOTE: IF YOU ARE UNABLE TO OBTAIN WOUND SUPPLIES, CONTINUE TO USE THE SUPPLIES YOU HAVE AVAILABLE UNTIL YOU ARE ABLE TO REACH US. IT IS MOST IMPORTANT TO KEEP THE WOUND COVERED AT ALL TIMES.     Electronically signed Katerine Galdamez RN on 6/8/2023 at 2:29 PM

## 2023-06-15 ENCOUNTER — OFFICE VISIT (OUTPATIENT)
Dept: VASCULAR SURGERY | Age: 83
End: 2023-06-15
Payer: MEDICARE

## 2023-06-15 VITALS
HEART RATE: 87 BPM | WEIGHT: 246 LBS | SYSTOLIC BLOOD PRESSURE: 97 MMHG | OXYGEN SATURATION: 92 % | DIASTOLIC BLOOD PRESSURE: 67 MMHG | BODY MASS INDEX: 36.43 KG/M2 | HEIGHT: 69 IN

## 2023-06-15 DIAGNOSIS — I89.0 LYMPHEDEMA OF BOTH LOWER EXTREMITIES: Primary | ICD-10-CM

## 2023-06-15 PROCEDURE — 1123F ACP DISCUSS/DSCN MKR DOCD: CPT | Performed by: STUDENT IN AN ORGANIZED HEALTH CARE EDUCATION/TRAINING PROGRAM

## 2023-06-15 PROCEDURE — 99214 OFFICE O/P EST MOD 30 MIN: CPT | Performed by: STUDENT IN AN ORGANIZED HEALTH CARE EDUCATION/TRAINING PROGRAM

## 2023-06-20 RX ORDER — ASPIRIN 81 MG/1
81 TABLET, CHEWABLE ORAL DAILY
Qty: 30 TABLET | Refills: 3 | Status: SHIPPED | OUTPATIENT
Start: 2023-06-20

## 2023-06-23 ENCOUNTER — OFFICE VISIT (OUTPATIENT)
Dept: UROLOGY | Age: 83
End: 2023-06-23

## 2023-06-23 DIAGNOSIS — R31.29 MICROHEMATURIA: ICD-10-CM

## 2023-06-23 DIAGNOSIS — Z85.51 HISTORY OF BLADDER CANCER: ICD-10-CM

## 2023-06-23 DIAGNOSIS — R39.198 DIFFICULTY URINATING: Primary | ICD-10-CM

## 2023-06-23 DIAGNOSIS — N40.1 BENIGN PROSTATIC HYPERPLASIA WITH LOWER URINARY TRACT SYMPTOMS, SYMPTOM DETAILS UNSPECIFIED: ICD-10-CM

## 2023-06-23 LAB
BILIRUBIN, URINE, POC: NEGATIVE
BLOOD URINE, POC: NORMAL
GLUCOSE URINE, POC: 500
KETONES, URINE, POC: NEGATIVE
LEUKOCYTE ESTERASE, URINE, POC: NEGATIVE
NITRITE, URINE, POC: NEGATIVE
PH, URINE, POC: 5 (ref 4.6–8)
PROTEIN,URINE, POC: >=300
PVR, POC: 126 CC
SPECIFIC GRAVITY, URINE, POC: 1.02 (ref 1–1.03)
URINALYSIS CLARITY, POC: NORMAL
URINALYSIS COLOR, POC: NORMAL
UROBILINOGEN, POC: NORMAL

## 2023-06-23 ASSESSMENT — ENCOUNTER SYMPTOMS: RESPIRATORY NEGATIVE: 1

## 2023-06-23 NOTE — PROGRESS NOTES
normal and patent, no erythema, discharge or polyps  Mouth - mucous membranes moist  Abdomen - soft, nontender, nondistended, no masses or organomegaly  Lymphatic-  No palpable lymphadenopathy  Neurological -  normal speech, no focal findings or movement disorder noted  Musculoskeletal - no deformity or swelling  Extremities - no pedal edema, no clubbing or cyanosis  Skin - normal coloration and turgor    UA mod bld 15-20 RBCs. Glu 500, Pro >300    Assessment/Plan    ICD-10-CM    1. Difficulty urinating  R39.198 AMB POC URINALYSIS DIP STICK AUTO W/O MICRO     AMB POC PVR, SILVINA,POST-VOID RES,US,NON-IMAGING      2. Benign prostatic hyperplasia with lower urinary tract symptoms, symptom details unspecified  N40.1       3. History of bladder cancer  Z85.51 CT ABDOMEN PELVIS HEMATURIA Additional Contrast? None      4. Microhematuria  R31.29 CT ABDOMEN PELVIS HEMATURIA Additional Contrast? None        RTO in 2 wks for cysto with CT prior. Cont Uroxatral.    NANETTE MC, DO    Total time for today's encounter including chart review, result review, documentation and face to face encounter was 46 minutes.

## 2023-06-27 ENCOUNTER — TELEPHONE (OUTPATIENT)
Dept: FAMILY MEDICINE CLINIC | Facility: CLINIC | Age: 83
End: 2023-06-27

## 2023-06-30 DIAGNOSIS — R31.29 MICROHEMATURIA: Primary | ICD-10-CM

## 2023-06-30 DIAGNOSIS — Z85.51 HISTORY OF BLADDER CANCER: ICD-10-CM

## 2023-07-07 ENCOUNTER — HOSPITAL ENCOUNTER (OUTPATIENT)
Dept: CT IMAGING | Age: 83
End: 2023-07-07
Attending: UROLOGY
Payer: MEDICARE

## 2023-07-07 DIAGNOSIS — R31.29 MICROHEMATURIA: ICD-10-CM

## 2023-07-07 DIAGNOSIS — Z85.51 HISTORY OF BLADDER CANCER: ICD-10-CM

## 2023-07-07 LAB — CREAT BLD-MCNC: 1.63 MG/DL (ref 0.8–1.5)

## 2023-07-07 PROCEDURE — 2580000003 HC RX 258: Performed by: UROLOGY

## 2023-07-07 PROCEDURE — 82565 ASSAY OF CREATININE: CPT

## 2023-07-07 PROCEDURE — 6360000004 HC RX CONTRAST MEDICATION: Performed by: UROLOGY

## 2023-07-07 PROCEDURE — 74178 CT ABD&PLV WO CNTR FLWD CNTR: CPT

## 2023-07-07 RX ORDER — SODIUM CHLORIDE 0.9 % (FLUSH) 0.9 %
5-40 SYRINGE (ML) INJECTION 2 TIMES DAILY
Status: DISCONTINUED | OUTPATIENT
Start: 2023-07-07 | End: 2023-07-11 | Stop reason: HOSPADM

## 2023-07-07 RX ORDER — 0.9 % SODIUM CHLORIDE 0.9 %
100 INTRAVENOUS SOLUTION INTRAVENOUS ONCE
Status: COMPLETED | OUTPATIENT
Start: 2023-07-07 | End: 2023-07-07

## 2023-07-07 RX ADMIN — IOPAMIDOL 100 ML: 755 INJECTION, SOLUTION INTRAVENOUS at 14:09

## 2023-07-07 RX ADMIN — SODIUM CHLORIDE 100 ML: 9 INJECTION, SOLUTION INTRAVENOUS at 14:09

## 2023-07-07 RX ADMIN — SODIUM CHLORIDE, PRESERVATIVE FREE 10 ML: 5 INJECTION INTRAVENOUS at 14:09

## 2023-07-11 ENCOUNTER — OFFICE VISIT (OUTPATIENT)
Dept: FAMILY MEDICINE CLINIC | Facility: CLINIC | Age: 83
End: 2023-07-11
Payer: MEDICARE

## 2023-07-11 VITALS
TEMPERATURE: 98.2 F | OXYGEN SATURATION: 96 % | HEART RATE: 76 BPM | WEIGHT: 257.4 LBS | DIASTOLIC BLOOD PRESSURE: 60 MMHG | BODY MASS INDEX: 38.01 KG/M2 | SYSTOLIC BLOOD PRESSURE: 120 MMHG

## 2023-07-11 DIAGNOSIS — L97.522 CHRONIC ULCER OF LEFT FOOT WITH FAT LAYER EXPOSED (HCC): Chronic | ICD-10-CM

## 2023-07-11 DIAGNOSIS — E78.2 MIXED HYPERLIPIDEMIA: ICD-10-CM

## 2023-07-11 DIAGNOSIS — I70.25: Chronic | ICD-10-CM

## 2023-07-11 DIAGNOSIS — E11.65 UNCONTROLLED TYPE 2 DIABETES MELLITUS WITH HYPERGLYCEMIA (HCC): Primary | ICD-10-CM

## 2023-07-11 LAB
ALBUMIN SERPL-MCNC: 3 G/DL (ref 3.2–4.6)
ALBUMIN/GLOB SERPL: 0.8 (ref 0.4–1.6)
ALP SERPL-CCNC: 86 U/L (ref 50–136)
ALT SERPL-CCNC: 21 U/L (ref 12–65)
ANION GAP SERPL CALC-SCNC: 5 MMOL/L (ref 2–11)
AST SERPL-CCNC: 15 U/L (ref 15–37)
BASOPHILS # BLD: 0.1 K/UL (ref 0–0.2)
BASOPHILS NFR BLD: 1 % (ref 0–2)
BILIRUB SERPL-MCNC: 0.6 MG/DL (ref 0.2–1.1)
BUN SERPL-MCNC: 31 MG/DL (ref 8–23)
CALCIUM SERPL-MCNC: 8.9 MG/DL (ref 8.3–10.4)
CHLORIDE SERPL-SCNC: 109 MMOL/L (ref 101–110)
CHOLEST SERPL-MCNC: 114 MG/DL
CO2 SERPL-SCNC: 25 MMOL/L (ref 21–32)
CREAT SERPL-MCNC: 1.4 MG/DL (ref 0.8–1.5)
DIFFERENTIAL METHOD BLD: NORMAL
EOSINOPHIL # BLD: 0.7 K/UL (ref 0–0.8)
EOSINOPHIL NFR BLD: 7 % (ref 0.5–7.8)
ERYTHROCYTE [DISTWIDTH] IN BLOOD BY AUTOMATED COUNT: 14.1 % (ref 11.9–14.6)
GLOBULIN SER CALC-MCNC: 3.7 G/DL (ref 2.8–4.5)
GLUCOSE SERPL-MCNC: 134 MG/DL (ref 65–100)
HCT VFR BLD AUTO: 44 % (ref 41.1–50.3)
HDLC SERPL-MCNC: 34 MG/DL (ref 40–60)
HDLC SERPL: 3.4
HGB BLD-MCNC: 14.1 G/DL (ref 13.6–17.2)
IMM GRANULOCYTES # BLD AUTO: 0.1 K/UL (ref 0–0.5)
IMM GRANULOCYTES NFR BLD AUTO: 1 % (ref 0–5)
LDLC SERPL CALC-MCNC: 59.4 MG/DL
LYMPHOCYTES # BLD: 3.2 K/UL (ref 0.5–4.6)
LYMPHOCYTES NFR BLD: 32 % (ref 13–44)
MCH RBC QN AUTO: 29.7 PG (ref 26.1–32.9)
MCHC RBC AUTO-ENTMCNC: 32 G/DL (ref 31.4–35)
MCV RBC AUTO: 92.8 FL (ref 82–102)
MONOCYTES # BLD: 0.9 K/UL (ref 0.1–1.3)
MONOCYTES NFR BLD: 9 % (ref 4–12)
NEUTS SEG # BLD: 5.2 K/UL (ref 1.7–8.2)
NEUTS SEG NFR BLD: 50 % (ref 43–78)
NRBC # BLD: 0 K/UL (ref 0–0.2)
PLATELET # BLD AUTO: 202 K/UL (ref 150–450)
PMV BLD AUTO: 9.5 FL (ref 9.4–12.3)
POTASSIUM SERPL-SCNC: 4.2 MMOL/L (ref 3.5–5.1)
PROT SERPL-MCNC: 6.7 G/DL (ref 6.3–8.2)
RBC # BLD AUTO: 4.74 M/UL (ref 4.23–5.6)
SODIUM SERPL-SCNC: 139 MMOL/L (ref 133–143)
TRIGL SERPL-MCNC: 103 MG/DL (ref 35–150)
VLDLC SERPL CALC-MCNC: 20.6 MG/DL (ref 6–23)
WBC # BLD AUTO: 10.2 K/UL (ref 4.3–11.1)

## 2023-07-11 PROCEDURE — 1123F ACP DISCUSS/DSCN MKR DOCD: CPT | Performed by: FAMILY MEDICINE

## 2023-07-11 PROCEDURE — 99214 OFFICE O/P EST MOD 30 MIN: CPT | Performed by: FAMILY MEDICINE

## 2023-07-11 PROCEDURE — 3052F HG A1C>EQUAL 8.0%<EQUAL 9.0%: CPT | Performed by: FAMILY MEDICINE

## 2023-07-12 LAB
EST. AVERAGE GLUCOSE BLD GHB EST-MCNC: 194 MG/DL
HBA1C MFR BLD: 8.4 % (ref 4.8–5.6)

## 2023-07-13 ASSESSMENT — ENCOUNTER SYMPTOMS
PHOTOPHOBIA: 0
NAUSEA: 0
EYE PAIN: 0
BLOOD IN STOOL: 0
COLOR CHANGE: 0
COUGH: 0
SHORTNESS OF BREATH: 0
ABDOMINAL PAIN: 0
ABDOMINAL DISTENTION: 0
SORE THROAT: 0
BLURRED VISION: 0

## 2023-07-14 ENCOUNTER — PROCEDURE VISIT (OUTPATIENT)
Dept: UROLOGY | Age: 83
End: 2023-07-14

## 2023-07-14 DIAGNOSIS — R31.29 MICROHEMATURIA: ICD-10-CM

## 2023-07-14 DIAGNOSIS — Z85.51 HISTORY OF BLADDER CANCER: Primary | ICD-10-CM

## 2023-07-14 DIAGNOSIS — N40.1 BENIGN PROSTATIC HYPERPLASIA WITH LOWER URINARY TRACT SYMPTOMS, SYMPTOM DETAILS UNSPECIFIED: ICD-10-CM

## 2023-07-14 DIAGNOSIS — K86.89 PANCREATIC MASS: ICD-10-CM

## 2023-07-14 LAB
BILIRUBIN, URINE, POC: NEGATIVE
BLOOD URINE, POC: NORMAL
GLUCOSE URINE, POC: 100
KETONES, URINE, POC: NEGATIVE
LEUKOCYTE ESTERASE, URINE, POC: NEGATIVE
NITRITE, URINE, POC: NEGATIVE
PH, URINE, POC: 5 (ref 4.6–8)
PROTEIN,URINE, POC: 300
SPECIFIC GRAVITY, URINE, POC: 1.02 (ref 1–1.03)
URINALYSIS CLARITY, POC: NORMAL
URINALYSIS COLOR, POC: NORMAL
UROBILINOGEN, POC: NORMAL

## 2023-07-14 NOTE — PROGRESS NOTES
[facility]  [address] Jose. Mary Guerrerokirill, [address] [place] [address] [place]  [phone]    Romaine Krueger  : 1940         HPI   78 y.o., male presents for cystoscopy. Pt previously seen by Aimee. He reports over 20 recurrences of his CaB in the past.  Last cysto was 2022 by Dr. Ольга Gaffney. Prior six wk BCG tx in distant past.  Denies hematuria or UTI's. Reports nocturia 3-4x per night on Uroxatral.  Prior Urolift on 20 by Dr. Jacky Puri. PVR today is 126cc by ultrasound. PSA was 4.1 on 3/30/23 and Cr was 1.44 on 23. CT on /10/23 showed normal upper tracts with a 1.7cm pancreatic mass. MRI recommended. Past Medical History:   Diagnosis Date    Cancer ([address] [facility])     Hyperlipidemia     Hypertension     Type 2 diabetes mellitus without complication (HCC)      Past Surgical History:   Procedure Laterality Date    BLADDER SURGERY      CHOLECYSTECTOMY      SKIN CANCER EXCISION      TONSILLECTOMY       Current Outpatient Medications   Medication Sig Dispense Refill    aspirin (ASPIRIN CHILDRENS) 81 MG chewable tablet Take 1 tablet by mouth daily 30 tablet 3    alfuzosin (UROXATRAL) 10 MG extended release tablet       insulin detemir (LEVEMIR FLEXTOUCH) 100 UNIT/ML injection pen 40 UNITS IN AM 35 UNITS IN PM 5 Adjustable Dose Pre-filled Pen Syringe 3    carvedilol (COREG) 6.25 MG tablet 4 tablets      Cyanocobalamin 2500 MCG TABS Take by mouth daily      digoxin (LANOXIN) 125 MCG tablet       dilTIAZem (TIAZAC) 360 MG extended release capsule       ONETOUCH VERIO strip       NOVOLOG FLEXPEN 100 UNIT/ML injection pen       LEVEMIR FLEXTOUCH 100 UNIT/ML injection pen 3,000 Units in the morning and at bedtime      lisinopril-hydroCHLOROthiazide (PRINZIDE;ZESTORETIC) 20-12.5 MG per tablet       XARELTO 15 MG TABS tablet Take 1 tablet by mouth Daily      rosuvastatin (CRESTOR) 10 MG tablet        No current facility-administered medications for this visit.      No Known Allergies  Social History     Socioeconomic

## 2023-07-14 NOTE — ASSESSMENT & PLAN NOTE
Monitored by specialist- no acute findings meriting change in the plan--WOUND [address]; NOTES REVIEWED

## 2023-07-14 NOTE — PROGRESS NOTES
Familia Mera  1940 is a 78 y.o. male ,Established patient, here for evaluation of the following chief complaint(s):   Chief Complaint   Patient presents with    1 Month Follow-Up     Was told to follow up in 6 weeks- would like to discuss his diabetic doctors. Was advised to have both legs wrap which he has but has concerns about them when he takes the wraps off with having redness and swelling in leg/feet. 1. Uncontrolled type 2 diabetes mellitus with hyperglycemia (HCC)  -     CBC with Auto Differential; Future  -     Comprehensive Metabolic Panel; Future  -     Hemoglobin A1C; Future  2. Mixed hyperlipidemia  -     Lipid Panel; Future  3. 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)  Assessment & Plan:   Monitored by specialist- no acute findings meriting change in the plan        Return in about 3 months (around 10/11/2023). Subjective   SUBJECTIVE/OBJECTIVE:  Diabetes  He presents for his follow-up diabetic visit. He has type 2 diabetes mellitus. His disease course has been worsening. Pertinent negatives for hypoglycemia include no confusion, headaches, pallor or speech difficulty. Pertinent negatives for diabetes include no blurred vision, no chest pain, no fatigue and no weakness. (SUGAR 213) Symptoms are worsening. Review of Systems   Constitutional:  Negative for chills, fatigue and fever. HENT:  Negative for ear pain, hearing loss and sore throat. Eyes:  Negative for blurred vision, photophobia and pain. Respiratory:  Negative for cough and shortness of breath. Cardiovascular:  Negative for chest pain, palpitations and leg swelling. Gastrointestinal:  Negative for abdominal distention, abdominal pain, blood in stool and nausea. Genitourinary:  Negative for dysuria and urgency. Musculoskeletal:  Negative for joint swelling and myalgias.    Skin:  Negative for color change, pallor and

## 2023-08-15 ENCOUNTER — OFFICE VISIT (OUTPATIENT)
Dept: FAMILY MEDICINE CLINIC | Facility: CLINIC | Age: 83
End: 2023-08-15
Payer: MEDICARE

## 2023-08-15 VITALS
WEIGHT: 245.6 LBS | SYSTOLIC BLOOD PRESSURE: 120 MMHG | BODY MASS INDEX: 36.27 KG/M2 | DIASTOLIC BLOOD PRESSURE: 78 MMHG | OXYGEN SATURATION: 95 % | HEART RATE: 74 BPM | TEMPERATURE: 98.7 F

## 2023-08-15 DIAGNOSIS — R41.89 COGNITIVE DECLINE: ICD-10-CM

## 2023-08-15 DIAGNOSIS — H91.8X3 OTHER SPECIFIED HEARING LOSS OF BOTH EARS: ICD-10-CM

## 2023-08-15 DIAGNOSIS — K86.89 PANCREATIC MASS: ICD-10-CM

## 2023-08-15 DIAGNOSIS — R41.3 IMPAIRMENT OF SHORT-TERM AND LONG-TERM MEMORY: ICD-10-CM

## 2023-08-15 DIAGNOSIS — K86.89 PANCREATIC MASS: Primary | ICD-10-CM

## 2023-08-15 DIAGNOSIS — N18.9 CHRONIC KIDNEY DISEASE, UNSPECIFIED CKD STAGE: ICD-10-CM

## 2023-08-15 DIAGNOSIS — N18.9 CHRONIC KIDNEY DISEASE, UNSPECIFIED CKD STAGE: Primary | ICD-10-CM

## 2023-08-15 PROCEDURE — 1123F ACP DISCUSS/DSCN MKR DOCD: CPT | Performed by: FAMILY MEDICINE

## 2023-08-15 PROCEDURE — 99214 OFFICE O/P EST MOD 30 MIN: CPT | Performed by: FAMILY MEDICINE

## 2023-08-15 ASSESSMENT — MINI MENTAL STATE EXAM
SAY: I AM GOING TO NAME THREE OBJECTS. WHEN I AM FINISHED, I WANT YOU TO REPEAT
THEM. REMEMBER WHAT THEY ARE BECAUSE I AM GOING TO ASK YOU TO NAME THEM AGAIN IN
A FEW MINUTES.  SAY THE FOLLOWING WORDS SLOWLY AT 1-SECOND INTERVALS - BALL/ CAR/ MAN [ITERATIONS FOR REPEAT ADMINISTRATION]: 1
SAY: I WOULD LIKE YOU TO COUNT BACKWARD FROM 100 BY SEVENS: 1
SAY: FOLD THE PAPER IN HALF ONCE WITH BOTH HANDS, SCORE IF PAPER IS CORRECTLY FOLDED IN HALF.: 1
HAND THE PERSON A PENCIL AND PAPER. SAY: WRITE ANY COMPLETE SENTENCE ON THAT
PIECE OF PAPER. (NOTE: THE SENTENCE MUST MAKE SENSE. IGNORE SPELLING ERRORS): 1
WHAT DAY OF THE WEEK IS THIS?: 1
SUM ALL MMSE QUESTIONS FOR TOTAL SCORE [OUT OF 30].: 20
WHAT STATE [OR PROVINCE] ARE WE IN?: 1
SHOW: PENCIL [OBJECT] ASK: WHAT IS THIS CALLED?: 1
SAY: PUT THE PAPER DOWN ON THE FLOOR, SCORE IF PAPER IS PLACED BACK ON FLOOR: 1
WHICH SEASON IS THIS?: 1
NOW WHAT WERE THE THREE OBJECTS I ASKED YOU TO REMEMBER?: 0
WHAT CITY/TOWN ARE WE IN?: 1
SHOW: WRISTWATCH [OBJECT] ASK: WHAT IS THIS CALLED?: 1
WHAT FLOOR ARE WE ON [IN FACILITY]?/ WHAT ROOM ARE WE IN [IN HOME]?: 1
SAY: READ THE WORDS ON THE PAGE AND THEN DO WHAT IT SAYS. THEN HAND THE PERSON
THE SHEET WITH CLOSE YOUR EYES ON IT. IF THE SUBJECT READS AND DOES NOT CLOSE THEIR EYES, REPEAT UP TO THREE TIMES. SCORE ONLY IF SUBJECT CLOSES EYES.: 1
ASK THE PERSON IF HE IS RIGHT OR LEFT-HANDED. TAKE A PIECE OF PAPER AND HOLD IT UP IN
FRONT OF THE PERSON. SAY: TAKE THIS PAPER IN YOUR RIGHT/LEFT HAND (WHICHEVER IS NON-
DOMINANT), SCORE IF PAPER IS PICKED UP IN CORRECT HAND.: 1
SAY: I WOULD LIKE YOU TO REPEAT THIS PHRASE AFTER ME: NO IFS, ANDS, OR BUTS.: 1
WHAT YEAR IS THIS?: 1
WHAT IS THE NAME OF THIS BUILDING [IN FACILITY]?/WHAT IS THE STREET ADDRESS OF THIS HOUSE [IN HOME]?: 1
PLACE DESIGN, ERASER AND PENCIL IN FRONT OF THE PERSON.  SAY:  COPY THIS DESIGN PLEASE.  SHOW: DESIGN. ALLOW: MULTIPLE TRIES. WAIT UNTIL PERSON IS FINISHED AND HANDS IT BACK. SCORE: ONLY FOR DIAGRAM WITH 4-SIDED FIGURE BETWEEN TWO 5-SIDED FIGURES: 1
WHAT MONTH IS THIS?: 1
WHAT COUNTRY ARE WE IN?: 1
WHAT IS TODAY'S DATE?: 0

## 2023-08-16 ASSESSMENT — ENCOUNTER SYMPTOMS
COLOR CHANGE: 0
NAUSEA: 0
BLURRED VISION: 0
SORE THROAT: 0
ABDOMINAL PAIN: 0
BLOOD IN STOOL: 0
COUGH: 0
PHOTOPHOBIA: 0
EYE PAIN: 0
SHORTNESS OF BREATH: 0
ABDOMINAL DISTENTION: 0

## 2023-08-16 NOTE — PROGRESS NOTES
Familia Mera  1940 is a 78 y.o. male ,Established patient, here for evaluation of the following chief complaint(s):   Chief Complaint   Patient presents with    1 Month Follow-Up     Left leg has went to the other provider and they have been taking care of the leg. Memory Loss          1. Pancreatic mass--work up ordered by urology-----MRA TO BE DONE. 2. Chronic kidney disease, unspecified CKD stage--CHECK BMP  3. Other specified hearing loss of both ears  -     [address] [facility]  4. Impairment of short-term and long-term memory  -     RPR; Future  5. Cognitive decline--hearing problem could contribute. -     RPR; Future        Return in about 6 weeks (around 9/26/2023). Subjective   SUBJECTIVE/OBJECTIVE:  He has been seen by urology/ dr Isaiah Merino in regard to history of bladder cancer and had cystoscopy. Also had ct abd/ pelvis which showed a PANCREATIC LESION between the body and the tail measuring 1.6x1.7----he is to have mri for further evaluation. HE DOES HAVE PROBLEM WITH HEARING---he wears hearing aids but continues to have problems    Memory Loss    Patient reports onset of memory loss was more than 1 year ago. Onset quality is gradual.     Symptoms associated with memory loss include changes in short-term memory. Symptoms comments: Repeats questions. Diabetes  He presents for his follow-up diabetic visit. He has type 2 diabetes mellitus. His disease course has been worsening. Pertinent negatives for hypoglycemia include no confusion, headaches, pallor or speech difficulty. Pertinent negatives for diabetes include no blurred vision, no chest pain, no fatigue, no foot paresthesias and no weakness. (He is to see endocrinology soon.)   Hyperlipidemia  This is a chronic problem. The current episode started more than 1 year ago. The problem is controlled.  Recent lipid tests were reviewed and are normal. Pertinent negatives include no chest pain, myalgias or shortness of

## 2023-08-21 DIAGNOSIS — E11.65 UNCONTROLLED TYPE 2 DIABETES MELLITUS WITH HYPERGLYCEMIA (HCC): ICD-10-CM

## 2023-09-27 ENCOUNTER — OFFICE VISIT (OUTPATIENT)
Dept: FAMILY MEDICINE CLINIC | Facility: CLINIC | Age: 83
End: 2023-09-27
Payer: MEDICARE

## 2023-09-27 VITALS
OXYGEN SATURATION: 96 % | HEART RATE: 89 BPM | DIASTOLIC BLOOD PRESSURE: 60 MMHG | BODY MASS INDEX: 36.37 KG/M2 | WEIGHT: 246.3 LBS | SYSTOLIC BLOOD PRESSURE: 100 MMHG | TEMPERATURE: 98 F

## 2023-09-27 DIAGNOSIS — I10 PRIMARY HYPERTENSION: ICD-10-CM

## 2023-09-27 DIAGNOSIS — R41.89 COGNITIVE DECLINE: ICD-10-CM

## 2023-09-27 DIAGNOSIS — R33.8 URINARY RETENTION DUE TO BENIGN PROSTATIC HYPERPLASIA: ICD-10-CM

## 2023-09-27 DIAGNOSIS — E78.2 MIXED HYPERLIPIDEMIA: ICD-10-CM

## 2023-09-27 DIAGNOSIS — R41.3 IMPAIRMENT OF SHORT-TERM AND LONG-TERM MEMORY: ICD-10-CM

## 2023-09-27 DIAGNOSIS — K86.89 PANCREATIC MASS: ICD-10-CM

## 2023-09-27 DIAGNOSIS — E11.65 UNCONTROLLED TYPE 2 DIABETES MELLITUS WITH HYPERGLYCEMIA (HCC): Primary | ICD-10-CM

## 2023-09-27 DIAGNOSIS — N40.1 URINARY RETENTION DUE TO BENIGN PROSTATIC HYPERPLASIA: ICD-10-CM

## 2023-09-27 LAB
ALBUMIN SERPL-MCNC: 3.2 G/DL (ref 3.2–4.6)
ALBUMIN/GLOB SERPL: 0.8 (ref 0.4–1.6)
ALP SERPL-CCNC: 100 U/L (ref 50–136)
ALT SERPL-CCNC: 24 U/L (ref 12–65)
ANION GAP SERPL CALC-SCNC: 6 MMOL/L (ref 2–11)
AST SERPL-CCNC: 12 U/L (ref 15–37)
BILIRUB DIRECT SERPL-MCNC: 0.1 MG/DL
BILIRUB SERPL-MCNC: 0.6 MG/DL (ref 0.2–1.1)
BUN SERPL-MCNC: 42 MG/DL (ref 8–23)
CALCIUM SERPL-MCNC: 9.1 MG/DL (ref 8.3–10.4)
CHLORIDE SERPL-SCNC: 105 MMOL/L (ref 101–110)
CO2 SERPL-SCNC: 29 MMOL/L (ref 21–32)
CREAT SERPL-MCNC: 2.1 MG/DL (ref 0.8–1.5)
GLOBULIN SER CALC-MCNC: 4.2 G/DL (ref 2.8–4.5)
GLUCOSE SERPL-MCNC: 199 MG/DL (ref 65–100)
HIV 1+2 AB+HIV1 P24 AG SERPL QL IA: NONREACTIVE
HIV 1/2 RESULT COMMENT: NORMAL
LIPASE SERPL-CCNC: 125 U/L (ref 73–393)
POTASSIUM SERPL-SCNC: 4.5 MMOL/L (ref 3.5–5.1)
PROT SERPL-MCNC: 7.4 G/DL (ref 6.3–8.2)
SODIUM SERPL-SCNC: 140 MMOL/L (ref 133–143)
TSH, 3RD GENERATION: 4.96 UIU/ML (ref 0.36–3.74)
VIT B12 SERPL-MCNC: 681 PG/ML (ref 193–986)

## 2023-09-27 PROCEDURE — 1123F ACP DISCUSS/DSCN MKR DOCD: CPT | Performed by: FAMILY MEDICINE

## 2023-09-27 PROCEDURE — 3078F DIAST BP <80 MM HG: CPT | Performed by: FAMILY MEDICINE

## 2023-09-27 PROCEDURE — 3052F HG A1C>EQUAL 8.0%<EQUAL 9.0%: CPT | Performed by: FAMILY MEDICINE

## 2023-09-27 PROCEDURE — 99214 OFFICE O/P EST MOD 30 MIN: CPT | Performed by: FAMILY MEDICINE

## 2023-09-27 PROCEDURE — 3074F SYST BP LT 130 MM HG: CPT | Performed by: FAMILY MEDICINE

## 2023-09-27 RX ORDER — LANCETS 33 GAUGE
EACH MISCELLANEOUS
COMMUNITY
Start: 2023-08-29

## 2023-09-28 LAB
EST. AVERAGE GLUCOSE BLD GHB EST-MCNC: 206 MG/DL
HBA1C MFR BLD: 8.8 % (ref 4.8–5.6)
RPR SER QL: NONREACTIVE

## 2023-09-29 RX ORDER — ALFUZOSIN HYDROCHLORIDE 10 MG/1
10 TABLET, EXTENDED RELEASE ORAL DAILY
Qty: 30 TABLET | Refills: 3 | Status: SHIPPED | OUTPATIENT
Start: 2023-09-29

## 2023-09-29 RX ORDER — RIVAROXABAN 15 MG/1
15 TABLET, FILM COATED ORAL
Qty: 42 TABLET | Refills: 3 | Status: SHIPPED | OUTPATIENT
Start: 2023-09-29

## 2023-09-29 RX ORDER — INSULIN DETEMIR 100 [IU]/ML
3000 INJECTION, SOLUTION SUBCUTANEOUS 2 TIMES DAILY
Qty: 5 ADJUSTABLE DOSE PRE-FILLED PEN SYRINGE | Refills: 3 | Status: SHIPPED | OUTPATIENT
Start: 2023-09-29

## 2023-09-29 RX ORDER — LISINOPRIL AND HYDROCHLOROTHIAZIDE 20; 12.5 MG/1; MG/1
1 TABLET ORAL DAILY
Qty: 30 TABLET | Refills: 3 | Status: SHIPPED | OUTPATIENT
Start: 2023-09-29

## 2023-09-29 RX ORDER — DILTIAZEM HYDROCHLORIDE 360 MG/1
360 CAPSULE, EXTENDED RELEASE ORAL DAILY
Qty: 30 CAPSULE | Refills: 3 | Status: SHIPPED | OUTPATIENT
Start: 2023-09-29

## 2023-09-29 RX ORDER — ROSUVASTATIN CALCIUM 10 MG/1
10 TABLET, COATED ORAL DAILY
Qty: 30 TABLET | Refills: 3 | Status: SHIPPED | OUTPATIENT
Start: 2023-09-29

## 2023-09-29 ASSESSMENT — ENCOUNTER SYMPTOMS
COUGH: 0
NAUSEA: 0
ABDOMINAL PAIN: 0
SHORTNESS OF BREATH: 0
EYE PAIN: 0
ABDOMINAL DISTENTION: 0
COLOR CHANGE: 0
PHOTOPHOBIA: 0
SORE THROAT: 0
BLURRED VISION: 0
BLOOD IN STOOL: 0

## 2023-09-29 NOTE — PROGRESS NOTES
Marilee Watkins  1940 is a 78 y.o. male ,Established patient, here for evaluation of the following chief complaint(s):   Chief Complaint   Patient presents with    1 Month Follow-Up     Pt is not fasting-          1. Uncontrolled type 2 diabetes mellitus with hyperglycemia ([address])  -     Basic Metabolic Panel; Future  -     Hemoglobin A1C; Future  -     insulin detemir (LEVEMIR FLEXPEN) 100 UNIT/ML injection pen; INJECT 40 UNITS UNDER THE SKIN IN THE MORNING AND 35 UNITS UNDER THE SKIN IN THE EVENING, Disp-15 mL, R-3Normal  -     LEVEMIR FLEXTOUCH 100 UNIT/ML injection pen; Inject 3,000 Units into the skin 2 times daily, Disp-5 Adjustable Dose Pre-filled Pen Syringe, R-3, DAWNormal  2. Impairment of short-term and long-term memory  -     HIV 1/2 Ag/Ab, 4TH Generation,W Rflx Confirm  -     RPR  -     Vitamin B12  -     TSH  3. Cognitive decline--will order dementia labs and to FOLLOW WITHOUT FAIL   -     HIV 1/2 Ag/Ab, 4TH Generation,W Rflx Confirm  -     RPR  -     Vitamin B12  -     TSH  4. Pancreatic mass--patient has not yet had MRA ordered by dr Claudia Ramsey RADIOLOGY-----patient will follow up on oct 3rd ---to have MRA---  -     Hepatic Function Panel  -     Lipase  5. Mixed hyperlipidemia  -     rosuvastatin (CRESTOR) 10 MG tablet; Take 1 tablet by mouth daily, Disp-30 tablet, R-3Normal  6. Primary hypertension  -     dilTIAZem (TIAZAC) 360 MG extended release capsule; Take 1 capsule by mouth daily, Disp-30 capsule, R-3Normal  -     lisinopril-hydroCHLOROthiazide (PRINZIDE;ZESTORETIC) 20-12.5 MG per tablet; Take 1 tablet by mouth daily, Disp-30 tablet, R-3Normal  7. Urinary retention due to benign prostatic hyperplasia  -     alfuzosin (UROXATRAL) 10 MG extended release tablet;  Take 1 tablet by mouth daily, Disp-30 tablet, R-3Normal    HISTORY OF CELLULITIS IN MAY--I WOULD LIKE TO REEXAMINE LEGS AND FEET    PERIPHERAL VASCULAR DISEASE---I JUST FOUND

## 2023-10-12 ENCOUNTER — OFFICE VISIT (OUTPATIENT)
Dept: FAMILY MEDICINE CLINIC | Facility: CLINIC | Age: 83
End: 2023-10-12
Payer: MEDICARE

## 2023-10-12 VITALS
BODY MASS INDEX: 36.62 KG/M2 | OXYGEN SATURATION: 95 % | HEART RATE: 50 BPM | WEIGHT: 248 LBS | TEMPERATURE: 98 F | SYSTOLIC BLOOD PRESSURE: 110 MMHG | DIASTOLIC BLOOD PRESSURE: 68 MMHG

## 2023-10-12 DIAGNOSIS — R10.13 EPIGASTRIC PAIN: ICD-10-CM

## 2023-10-12 DIAGNOSIS — K86.89 PANCREATIC MASS: ICD-10-CM

## 2023-10-12 DIAGNOSIS — E11.65 UNCONTROLLED TYPE 2 DIABETES MELLITUS WITH HYPERGLYCEMIA (HCC): ICD-10-CM

## 2023-10-12 DIAGNOSIS — R53.83 MALAISE AND FATIGUE: ICD-10-CM

## 2023-10-12 DIAGNOSIS — R53.81 MALAISE AND FATIGUE: ICD-10-CM

## 2023-10-12 DIAGNOSIS — N18.30 CHRONIC RENAL FAILURE, STAGE 3 (MODERATE), UNSPECIFIED WHETHER STAGE 3A OR 3B CKD (HCC): Primary | ICD-10-CM

## 2023-10-12 LAB
ALBUMIN SERPL-MCNC: 3.1 G/DL (ref 3.2–4.6)
ALBUMIN/GLOB SERPL: 0.8 (ref 0.4–1.6)
ALP SERPL-CCNC: 95 U/L (ref 50–136)
ALT SERPL-CCNC: 25 U/L (ref 12–65)
ANION GAP SERPL CALC-SCNC: 5 MMOL/L (ref 2–11)
AST SERPL-CCNC: 12 U/L (ref 15–37)
BASOPHILS # BLD: 0.1 K/UL (ref 0–0.2)
BASOPHILS NFR BLD: 1 % (ref 0–2)
BILIRUB SERPL-MCNC: 0.6 MG/DL (ref 0.2–1.1)
BUN SERPL-MCNC: 46 MG/DL (ref 8–23)
CALCIUM SERPL-MCNC: 9.1 MG/DL (ref 8.3–10.4)
CHLORIDE SERPL-SCNC: 105 MMOL/L (ref 101–110)
CO2 SERPL-SCNC: 30 MMOL/L (ref 21–32)
CREAT SERPL-MCNC: 2.3 MG/DL (ref 0.8–1.5)
DIFFERENTIAL METHOD BLD: ABNORMAL
EOSINOPHIL # BLD: 0.6 K/UL (ref 0–0.8)
EOSINOPHIL NFR BLD: 5 % (ref 0.5–7.8)
ERYTHROCYTE [DISTWIDTH] IN BLOOD BY AUTOMATED COUNT: 14.4 % (ref 11.9–14.6)
GLOBULIN SER CALC-MCNC: 3.8 G/DL (ref 2.8–4.5)
GLUCOSE SERPL-MCNC: 329 MG/DL (ref 65–100)
HCT VFR BLD AUTO: 41.1 % (ref 41.1–50.3)
HGB BLD-MCNC: 12.9 G/DL (ref 13.6–17.2)
IMM GRANULOCYTES # BLD AUTO: 0.1 K/UL (ref 0–0.5)
IMM GRANULOCYTES NFR BLD AUTO: 1 % (ref 0–5)
LYMPHOCYTES # BLD: 4.4 K/UL (ref 0.5–4.6)
LYMPHOCYTES NFR BLD: 37 % (ref 13–44)
MCH RBC QN AUTO: 29.9 PG (ref 26.1–32.9)
MCHC RBC AUTO-ENTMCNC: 31.4 G/DL (ref 31.4–35)
MCV RBC AUTO: 95.1 FL (ref 82–102)
MONOCYTES # BLD: 1.1 K/UL (ref 0.1–1.3)
MONOCYTES NFR BLD: 9 % (ref 4–12)
NEUTS SEG # BLD: 5.7 K/UL (ref 1.7–8.2)
NEUTS SEG NFR BLD: 47 % (ref 43–78)
NRBC # BLD: 0 K/UL (ref 0–0.2)
PLATELET # BLD AUTO: 251 K/UL (ref 150–450)
PLATELET COMMENT: ADEQUATE
PMV BLD AUTO: 9.6 FL (ref 9.4–12.3)
POTASSIUM SERPL-SCNC: 4.6 MMOL/L (ref 3.5–5.1)
PROT SERPL-MCNC: 6.9 G/DL (ref 6.3–8.2)
RBC # BLD AUTO: 4.32 M/UL (ref 4.23–5.6)
RBC MORPH BLD: ABNORMAL
SODIUM SERPL-SCNC: 140 MMOL/L (ref 133–143)
TSH, 3RD GENERATION: 3.34 UIU/ML (ref 0.36–3.74)
WBC # BLD AUTO: 12 K/UL (ref 4.3–11.1)
WBC MORPH BLD: ABNORMAL

## 2023-10-12 PROCEDURE — 1123F ACP DISCUSS/DSCN MKR DOCD: CPT | Performed by: FAMILY MEDICINE

## 2023-10-12 PROCEDURE — 99214 OFFICE O/P EST MOD 30 MIN: CPT | Performed by: FAMILY MEDICINE

## 2023-10-12 PROCEDURE — 3052F HG A1C>EQUAL 8.0%<EQUAL 9.0%: CPT | Performed by: FAMILY MEDICINE

## 2023-10-14 ASSESSMENT — ENCOUNTER SYMPTOMS
NAUSEA: 0
COLOR CHANGE: 0
PHOTOPHOBIA: 0
ABDOMINAL DISTENTION: 0
BLOOD IN STOOL: 0
SHORTNESS OF BREATH: 0
ABDOMINAL PAIN: 1
SORE THROAT: 0
COUGH: 0
BLURRED VISION: 0
EYE PAIN: 0

## 2023-10-14 NOTE — PROGRESS NOTES
Karyle Bence  1940 is a 78 y.o. male ,Established patient, here for evaluation of the following chief complaint(s):   Chief Complaint   Patient presents with    Follow-up     Both feet been having numbness has been getting a few reading around a 100 should he still do the insulin or not. 1. Chronic renal failure, stage 3 (moderate), unspecified whether stage 3a or 3b CKD ([address])  -     [address] [facility]  2. Malaise and fatigue  -     CBC with Auto Differential; Future  -     Comprehensive Metabolic Panel; Future  -     TSH; Future  3. Uncontrolled type 2 diabetes mellitus with hyperglycemia (HCC)--URGED TO BE MORE COMPLIANT WITH DIET. 4. Pancreatic mass--FOUND BY DR MC----INSURANCE DID NOT APPROVE MRI ; WILL REORDER.  -     MRI ABDOMEN WO CONTRAST; Future  5. Epigastric pain  -     MRI ABDOMEN WO CONTRAST; Future        Return in about 6 weeks (around 11/23/2023). Subjective   SUBJECTIVE/OBJECTIVE:  His AIC IS 8.8 TSH 4.9 CREATININE 2.10    Fatigue  This is a chronic problem. The current episode started more than 1 year ago. The problem occurs daily. The problem has been unchanged. Associated symptoms include abdominal pain and fatigue. Pertinent negatives include no chest pain, chills, congestion, coughing, fever, headaches, joint swelling, myalgias, nausea, rash, sore throat or weakness. Diabetes  He presents for his follow-up diabetic visit. He has type 2 diabetes mellitus. His disease course has been fluctuating. Pertinent negatives for hypoglycemia include no confusion, headaches, pallor, speech difficulty or sweats. Associated symptoms include fatigue. Pertinent negatives for diabetes include no blurred vision, no chest pain and no weakness. Symptoms are worsening. Hypertension  This is a chronic problem. The current episode started more than 1 year ago. The problem has been gradually improving since onset. Associated symptoms include malaise/fatigue.  Pertinent

## 2023-10-20 ENCOUNTER — PROCEDURE VISIT (OUTPATIENT)
Dept: UROLOGY | Age: 83
End: 2023-10-20
Payer: MEDICARE

## 2023-10-20 ENCOUNTER — TELEPHONE (OUTPATIENT)
Dept: UROLOGY | Age: 83
End: 2023-10-20

## 2023-10-20 DIAGNOSIS — N40.1 BENIGN PROSTATIC HYPERPLASIA WITH LOWER URINARY TRACT SYMPTOMS, SYMPTOM DETAILS UNSPECIFIED: ICD-10-CM

## 2023-10-20 DIAGNOSIS — Z85.51 HISTORY OF BLADDER CANCER: Primary | ICD-10-CM

## 2023-10-20 DIAGNOSIS — Z85.51 HISTORY OF BLADDER CANCER: ICD-10-CM

## 2023-10-20 LAB
BILIRUBIN, URINE, POC: NEGATIVE
BLOOD URINE, POC: NORMAL
GLUCOSE URINE, POC: 500
KETONES, URINE, POC: NEGATIVE
LEUKOCYTE ESTERASE, URINE, POC: NORMAL
NITRITE, URINE, POC: NEGATIVE
PH, URINE, POC: 5.5 (ref 4.6–8)
PROTEIN,URINE, POC: 100
SPECIFIC GRAVITY, URINE, POC: 1.01 (ref 1–1.03)
URINALYSIS CLARITY, POC: NORMAL
URINALYSIS COLOR, POC: NORMAL
UROBILINOGEN, POC: NORMAL

## 2023-10-20 PROCEDURE — 52000 CYSTOURETHROSCOPY: CPT | Performed by: UROLOGY

## 2023-10-20 PROCEDURE — 1123F ACP DISCUSS/DSCN MKR DOCD: CPT | Performed by: UROLOGY

## 2023-10-20 PROCEDURE — 81003 URINALYSIS AUTO W/O SCOPE: CPT | Performed by: UROLOGY

## 2023-10-20 PROCEDURE — 99214 OFFICE O/P EST MOD 30 MIN: CPT | Performed by: UROLOGY

## 2023-10-20 RX ORDER — SULFAMETHOXAZOLE AND TRIMETHOPRIM 800; 160 MG/1; MG/1
1 TABLET ORAL 2 TIMES DAILY
Qty: 14 TABLET | Refills: 0 | Status: SHIPPED | OUTPATIENT
Start: 2023-10-20 | End: 2023-10-27

## 2023-10-20 NOTE — PROGRESS NOTES
tablet 3    COMFORT EZ PEN NEEDLES 32G X 4 MM MISC       aspirin (ASPIRIN CHILDRENS) 81 MG chewable tablet Take 1 tablet by mouth daily 30 tablet 3    carvedilol (COREG) 6.25 MG tablet 4 tablets      Cyanocobalamin 2500 MCG TABS Take by mouth daily      digoxin (LANOXIN) 125 MCG tablet       ONETOUCH VERIO strip       NOVOLOG FLEXPEN 100 UNIT/ML injection pen        No current facility-administered medications for this visit. No Known Allergies  Social History     Socioeconomic History    Marital status:      Spouse name: Not on file    Number of children: Not on file    Years of education: Not on file    Highest education level: Not on file   Occupational History    Not on file   Tobacco Use    Smoking status: Former     Types: Cigars, Pipe    Smokeless tobacco: Never   Vaping Use    Vaping Use: Never used   Substance and Sexual Activity    Alcohol use: Not Currently    Drug use: Not on file    Sexual activity: Not on file   Other Topics Concern    Not on file   Social History Narrative    Not on file     Social Determinants of Health     Financial Resource Strain: Low Risk  (3/30/2023)    Overall Financial Resource Strain (CARDIA)     Difficulty of Paying Living Expenses: Not hard at all   Food Insecurity: No Food Insecurity (3/30/2023)    Hunger Vital Sign     Worried About Running Out of Food in the Last Year: Never true     [address] in the Last Year: Never true   Transportation Needs: Unknown (3/30/2023)    PRAPARE - Transportation     Lack of Transportation (Medical): Not on file     Lack of Transportation (Non-Medical):  No   Physical Activity: Not on file   Stress: Not on file   Social Connections: Not on file   Intimate Partner Violence: Not on file   Housing Stability: Unknown (3/30/2023)    Housing Stability Vital Sign     Unable to Pay for Housing in the Last Year: Not on file     Number of Places Lived in the Last Year: Not on file     Unstable Housing in the Last Year: No     History

## 2023-10-20 NOTE — TELEPHONE ENCOUNTER
----- Message from Fina Quijano DO sent at 10/20/2023 12:08 PM EDT -----  Regarding: surg  Cysto, bladder biopsies, fulg  45min  Gen  Outpt  Cbc, bmp, ua, ucx  Cipro 400mg IV preop

## 2023-10-26 ENCOUNTER — TELEPHONE (OUTPATIENT)
Dept: UROLOGY | Age: 83
End: 2023-10-26

## 2023-10-26 ENCOUNTER — PREP FOR PROCEDURE (OUTPATIENT)
Dept: UROLOGY | Age: 83
End: 2023-10-26

## 2023-10-26 DIAGNOSIS — Z85.51 HISTORY OF BLADDER CANCER: Primary | ICD-10-CM

## 2023-10-26 NOTE — TELEPHONE ENCOUNTER
Procedures: Procedure(s):   CYSTOSCOPY BLADDER BIOPSY, FULGURATION   Date: 11/17/2023   Time: 1619   Location: [facility] MAIN OR 01 CYSTO

## 2023-10-26 NOTE — TELEPHONE ENCOUNTER
Cardiac Pre-operative Assessment      Physician or Practice Requesting Medication Clearance or Risk Assessment: Dr Radha Ewing: Nithin Pickens Phone Number: [phone]    Fax Number: [phone]    Date of Surgery/Procedure: 11/17    Type of Surgery or Procedure: bladder biopsies       Medications to hold Xarelto    # Days pre-procedure to hold 3    Restart medication ____    Risk assessment:      Please send there response back in this phone encounter

## 2023-10-29 NOTE — TELEPHONE ENCOUNTER
Xarelto may be restarted THE DAY AFTER SURGERY unless there is any significant post-operative bleeding

## 2023-11-06 ENCOUNTER — TELEPHONE (OUTPATIENT)
Dept: UROLOGY | Age: 83
End: 2023-11-06

## 2023-11-06 NOTE — TELEPHONE ENCOUNTER
Pt left vm wanting to speak with hanane, I called pt back , he stated he does not want to do the surgery he  scheduled for, but wanted to do the MRIthe was previously discussed if it was approved?

## 2023-11-08 ENCOUNTER — TELEPHONE (OUTPATIENT)
Dept: UROLOGY | Age: 83
End: 2023-11-08

## 2023-11-08 NOTE — TELEPHONE ENCOUNTER
The patient canceled the biopsy and fulguration and does not want to reschedule. He has other stuff going on with his wife.   He says he is getting the mri done and was given the number to schedule

## 2023-11-29 ENCOUNTER — OFFICE VISIT (OUTPATIENT)
Dept: FAMILY MEDICINE CLINIC | Facility: CLINIC | Age: 83
End: 2023-11-29
Payer: MEDICARE

## 2023-11-29 VITALS
SYSTOLIC BLOOD PRESSURE: 110 MMHG | DIASTOLIC BLOOD PRESSURE: 70 MMHG | WEIGHT: 250 LBS | BODY MASS INDEX: 36.92 KG/M2 | TEMPERATURE: 98 F

## 2023-11-29 DIAGNOSIS — Q45.3 PANCREATIC DUCTAL ABNORMALITY: ICD-10-CM

## 2023-11-29 DIAGNOSIS — Z85.51 HISTORY OF BLADDER CANCER: Primary | ICD-10-CM

## 2023-11-29 DIAGNOSIS — Z79.4 TYPE 2 DIABETES MELLITUS WITH OTHER SPECIFIED COMPLICATION, WITH LONG-TERM CURRENT USE OF INSULIN (HCC): ICD-10-CM

## 2023-11-29 DIAGNOSIS — E11.69 TYPE 2 DIABETES MELLITUS WITH OTHER SPECIFIED COMPLICATION, WITH LONG-TERM CURRENT USE OF INSULIN (HCC): ICD-10-CM

## 2023-11-29 PROCEDURE — 3052F HG A1C>EQUAL 8.0%<EQUAL 9.0%: CPT | Performed by: FAMILY MEDICINE

## 2023-11-29 PROCEDURE — 1123F ACP DISCUSS/DSCN MKR DOCD: CPT | Performed by: FAMILY MEDICINE

## 2023-11-29 PROCEDURE — 99214 OFFICE O/P EST MOD 30 MIN: CPT | Performed by: FAMILY MEDICINE

## 2023-11-29 ASSESSMENT — ENCOUNTER SYMPTOMS
NAUSEA: 0
BLOOD IN STOOL: 0
SORE THROAT: 0
ABDOMINAL DISTENTION: 0
COLOR CHANGE: 0
SHORTNESS OF BREATH: 0
BLURRED VISION: 0
PHOTOPHOBIA: 0
ABDOMINAL PAIN: 0
EYE PAIN: 0
COUGH: 0

## 2023-11-29 NOTE — PROGRESS NOTES
Elder Cannon  1940 is a 78 y.o. male ,Established patient, here for evaluation of the following chief complaint(s):   Chief Complaint   Patient presents with    Follow-up     6 week- just coming in to go over his sugar levels           1. History of bladder cancer--he has had ct which showed pancreatic abnormality which could be a mass but patient has yet to get the exam done. He states that his wife is very sick and he cannot leave her side at this time. 2. Pancreatic ductal abnormality--THE MRI HAS BEEN ORDERED TWICE. I will have ms mann to check and make sure that there are no problems with approval.    3. Type 2 diabetes mellitus with other specified complication, with long-term current use of insulin (HCC)--IT IS NOT TIME TO CHECK THE AIC---he has been advised to [address] TO 30 UNITS and see if this will affect the morning sugar    PATIENT DECLINES NUTRITIONAL COUNSELING and states that he may just ''quit coming to doctors at all''    I DID EXPLAIN  [address] MRI DONE AS THIS COULD BE PANCREATIC CANCER AND THAT THE LONGER HE WAITS [place]. PATIENT VERBALIZES UNDERSTANDING. Return in about 2 months (around 1/29/2024). Subjective   SUBJECTIVE/OBJECTIVE:  The patient presents in today to follow up on his problems. He originally had a CT ABD in the middle of this year which showed a possible pancreatic mass. At the last visit I reordered an MRI which had originally been ordered by urology. He did have some questions about his sugar as it is dropping low in the morning. Diabetes  He presents for his follow-up diabetic visit. He has type 2 diabetes mellitus. His disease course has been fluctuating. Pertinent negatives for hypoglycemia include no confusion, headaches, pallor or speech difficulty.  Pertinent negatives for diabetes include no blurred vision, no chest pain, no fatigue, no foot paresthesias and

## 2023-12-07 ENCOUNTER — TELEPHONE (OUTPATIENT)
Dept: FAMILY MEDICINE CLINIC | Facility: CLINIC | Age: 83
End: 2023-12-07

## 2023-12-07 NOTE — TELEPHONE ENCOUNTER
[facility] called to let us know  was unable to get his MRI done that  ordered Oct 14th because it was denied by INS, would  you mind checking on this and seeing if this is a PA issue or possibly the billing code. Please follow up with pt with an update or route it back to me and I would be happy to.    Thank you   Gema

## 2023-12-07 NOTE — TELEPHONE ENCOUNTER
Roxanna with [facility]  Nephrology called to inquire on our mutual patient , to see wether  he had a referral placed for an MRI for the pancreatic mass, upon looking into the chart I Informed her that he did have the referral placed on 10/14/23 and it appears that the called multiple times and could not reach the patient.  She stated she would call the pt and see if they were able to call back to the scheduling center and get him scheduled again to have this test done  [facility]

## 2023-12-12 ENCOUNTER — TELEPHONE (OUTPATIENT)
Dept: FAMILY MEDICINE CLINIC | Facility: CLINIC | Age: 83
End: 2023-12-12

## 2023-12-12 NOTE — TELEPHONE ENCOUNTER
Nephrology is calling to in regards to this patient to see if he has had his MRI done yet? Last time they called, she stated that there were some issues with his insurance. I checked the chart but did not see any updated notes. Please give her a call back with an update.     Thank you

## 2023-12-13 DIAGNOSIS — K59.04 CHRONIC IDIOPATHIC CONSTIPATION: Primary | ICD-10-CM

## 2023-12-21 ENCOUNTER — HOSPITAL ENCOUNTER (OUTPATIENT)
Dept: ULTRASOUND IMAGING | Age: 83
Discharge: HOME | End: 2023-12-24
Payer: MEDICARE

## 2023-12-21 DIAGNOSIS — N18.4 CKD (CHRONIC KIDNEY DISEASE) STAGE 4, GFR 15-29 ML/MIN (HCC): ICD-10-CM

## 2023-12-21 PROCEDURE — 76770 US EXAM ABDO BACK WALL COMP: CPT

## 2024-01-03 ENCOUNTER — OFFICE VISIT (OUTPATIENT)
Dept: FAMILY MEDICINE CLINIC | Facility: CLINIC | Age: 84
End: 2024-01-03
Payer: MEDICARE

## 2024-01-03 VITALS
WEIGHT: 260 LBS | BODY MASS INDEX: 38.4 KG/M2 | HEART RATE: 60 BPM | SYSTOLIC BLOOD PRESSURE: 110 MMHG | DIASTOLIC BLOOD PRESSURE: 78 MMHG | TEMPERATURE: 97.2 F | OXYGEN SATURATION: 94 %

## 2024-01-03 DIAGNOSIS — Z91.199 NONCOMPLIANCE BY DECLINING INTERVENTION OR SUPPORT: ICD-10-CM

## 2024-01-03 DIAGNOSIS — B37.2 YEAST DERMATITIS: ICD-10-CM

## 2024-01-03 DIAGNOSIS — J40 BRONCHITIS: ICD-10-CM

## 2024-01-03 DIAGNOSIS — N40.0 BENIGN PROSTATIC HYPERPLASIA WITHOUT LOWER URINARY TRACT SYMPTOMS: ICD-10-CM

## 2024-01-03 DIAGNOSIS — Z79.4 TYPE 2 DIABETES MELLITUS WITH OTHER SPECIFIED COMPLICATION, WITH LONG-TERM CURRENT USE OF INSULIN (HCC): ICD-10-CM

## 2024-01-03 DIAGNOSIS — I10 PRIMARY HYPERTENSION: Primary | ICD-10-CM

## 2024-01-03 DIAGNOSIS — E11.69 TYPE 2 DIABETES MELLITUS WITH OTHER SPECIFIED COMPLICATION, WITH LONG-TERM CURRENT USE OF INSULIN (HCC): ICD-10-CM

## 2024-01-03 DIAGNOSIS — I48.20 CHRONIC ATRIAL FIBRILLATION (HCC): ICD-10-CM

## 2024-01-03 DIAGNOSIS — K86.89 PANCREATIC MASS: ICD-10-CM

## 2024-01-03 DIAGNOSIS — Z85.51 HISTORY OF BLADDER CANCER: ICD-10-CM

## 2024-01-03 LAB
ALBUMIN SERPL-MCNC: 3.2 G/DL (ref 3.2–4.6)
ALBUMIN/GLOB SERPL: 1 (ref 0.4–1.6)
ALP SERPL-CCNC: 127 U/L (ref 50–136)
ALT SERPL-CCNC: 37 U/L (ref 12–65)
ANION GAP SERPL CALC-SCNC: 2 MMOL/L (ref 2–11)
AST SERPL-CCNC: 25 U/L (ref 15–37)
BASOPHILS # BLD: 0.1 K/UL (ref 0–0.2)
BASOPHILS NFR BLD: 1 % (ref 0–2)
BILIRUB SERPL-MCNC: 0.8 MG/DL (ref 0.2–1.1)
BUN SERPL-MCNC: 38 MG/DL (ref 8–23)
CALCIUM SERPL-MCNC: 8.7 MG/DL (ref 8.3–10.4)
CHLORIDE SERPL-SCNC: 110 MMOL/L (ref 103–113)
CO2 SERPL-SCNC: 29 MMOL/L (ref 21–32)
CREAT SERPL-MCNC: 1.9 MG/DL (ref 0.8–1.5)
DIFFERENTIAL METHOD BLD: ABNORMAL
EOSINOPHIL # BLD: 0.5 K/UL (ref 0–0.8)
EOSINOPHIL NFR BLD: 6 % (ref 0.5–7.8)
ERYTHROCYTE [DISTWIDTH] IN BLOOD BY AUTOMATED COUNT: 14.8 % (ref 11.9–14.6)
GLOBULIN SER CALC-MCNC: 3.2 G/DL (ref 2.8–4.5)
GLUCOSE SERPL-MCNC: 147 MG/DL (ref 65–100)
HCT VFR BLD AUTO: 39.6 % (ref 41.1–50.3)
HGB BLD-MCNC: 12 G/DL (ref 13.6–17.2)
IMM GRANULOCYTES # BLD AUTO: 0 K/UL (ref 0–0.5)
IMM GRANULOCYTES NFR BLD AUTO: 0 % (ref 0–5)
LIPASE SERPL-CCNC: 167 U/L (ref 73–393)
LYMPHOCYTES # BLD: 3.8 K/UL (ref 0.5–4.6)
LYMPHOCYTES NFR BLD: 40 % (ref 13–44)
MCH RBC QN AUTO: 29.2 PG (ref 26.1–32.9)
MCHC RBC AUTO-ENTMCNC: 30.3 G/DL (ref 31.4–35)
MCV RBC AUTO: 96.4 FL (ref 82–102)
MONOCYTES # BLD: 0.7 K/UL (ref 0.1–1.3)
MONOCYTES NFR BLD: 7 % (ref 4–12)
NEUTS SEG # BLD: 4.3 K/UL (ref 1.7–8.2)
NEUTS SEG NFR BLD: 46 % (ref 43–78)
NRBC # BLD: 0 K/UL (ref 0–0.2)
PLATELET # BLD AUTO: 185 K/UL (ref 150–450)
PMV BLD AUTO: 9.5 FL (ref 9.4–12.3)
POTASSIUM SERPL-SCNC: 4.3 MMOL/L (ref 3.5–5.1)
PROT SERPL-MCNC: 6.4 G/DL (ref 6.3–8.2)
PSA SERPL-MCNC: 4 NG/ML
RBC # BLD AUTO: 4.11 M/UL (ref 4.23–5.6)
SODIUM SERPL-SCNC: 141 MMOL/L (ref 136–146)
TSH, 3RD GENERATION: 3.42 UIU/ML (ref 0.36–3.74)
WBC # BLD AUTO: 9.4 K/UL (ref 4.3–11.1)

## 2024-01-03 PROCEDURE — 3074F SYST BP LT 130 MM HG: CPT | Performed by: FAMILY MEDICINE

## 2024-01-03 PROCEDURE — 3078F DIAST BP <80 MM HG: CPT | Performed by: FAMILY MEDICINE

## 2024-01-03 PROCEDURE — 1123F ACP DISCUSS/DSCN MKR DOCD: CPT | Performed by: FAMILY MEDICINE

## 2024-01-03 PROCEDURE — 3051F HG A1C>EQUAL 7.0%<8.0%: CPT | Performed by: FAMILY MEDICINE

## 2024-01-03 PROCEDURE — 99214 OFFICE O/P EST MOD 30 MIN: CPT | Performed by: FAMILY MEDICINE

## 2024-01-03 RX ORDER — FLUCONAZOLE 150 MG/1
150 TABLET ORAL ONCE
Qty: 1 TABLET | Refills: 0 | Status: SHIPPED | OUTPATIENT
Start: 2024-01-03 | End: 2024-01-03

## 2024-01-03 RX ORDER — AZITHROMYCIN 250 MG/1
250 TABLET, FILM COATED ORAL SEE ADMIN INSTRUCTIONS
Qty: 6 TABLET | Refills: 0 | Status: SHIPPED | OUTPATIENT
Start: 2024-01-03 | End: 2024-01-08

## 2024-01-03 RX ORDER — NYSTATIN 100000 [USP'U]/G
POWDER TOPICAL
Qty: 60 G | Refills: 2 | Status: SHIPPED | OUTPATIENT
Start: 2024-01-03

## 2024-01-03 ASSESSMENT — PATIENT HEALTH QUESTIONNAIRE - PHQ9
SUM OF ALL RESPONSES TO PHQ9 QUESTIONS 1 & 2: 0
1. LITTLE INTEREST OR PLEASURE IN DOING THINGS: 0
SUM OF ALL RESPONSES TO PHQ QUESTIONS 1-9: 0
2. FEELING DOWN, DEPRESSED OR HOPELESS: 0

## 2024-01-04 LAB
EST. AVERAGE GLUCOSE BLD GHB EST-MCNC: 160 MG/DL
HBA1C MFR BLD: 7.2 % (ref 4.8–5.6)

## 2024-01-05 ASSESSMENT — ENCOUNTER SYMPTOMS
PHOTOPHOBIA: 0
ABDOMINAL DISTENTION: 0
NAUSEA: 0
BLURRED VISION: 0
EYE PAIN: 0
COLOR CHANGE: 0
SORE THROAT: 0
COUGH: 1
SHORTNESS OF BREATH: 0
BLOOD IN STOOL: 0
ABDOMINAL PAIN: 0

## 2024-01-05 NOTE — PROGRESS NOTES
Kiran Galvez  1940 is a 83 y.o. male ,Established patient, here for evaluation of the following chief complaint(s):   Chief Complaint   Patient presents with    3 Month Follow-Up     Has had a cough for a few days now and would like to discuss this- also had been having bumps come up above groin area then goes away- also is interested in talking about home health to see what things could be able to help him. Also was given a pill to relax the bladder, and it helps during the day but then come night he has trouble.          1. Primary hypertension  -     CBC with Auto Differential; Future  -     Comprehensive Metabolic Panel; Future  2. Chronic atrial fibrillation (HCC)--has been seen by er and cardiology and declines treatment as ''he needs to go home and take care of sick wife''  -     CBC with Auto Differential; Future  -     Comprehensive Metabolic Panel; Future  -     TSH; Future  3. Type 2 diabetes mellitus with other specified complication, with long-term current use of insulin (HCC)  -     Hemoglobin A1C; Future  -     MRI ABDOMEN WO CONTRAST; Future  4. History of bladder cancer  -     PSA, Diagnostic; Future  5. Pancreatic mass--HAS HAD A PANCREATIC MASS FOR OVER 6 MONTHS and mri scheduled numersou times  -     Lipase; Future  -     MRI ABDOMEN WO CONTRAST; Future  6. Benign prostatic hyperplasia without lower urinary tract symptoms  -     PSA, Diagnostic; Future  7. Bronchitis  -     azithromycin (ZITHROMAX) 250 MG tablet; Take 1 tablet by mouth See Admin Instructions for 5 days 500mg on day 1 followed by 250mg on days 2 - 5, Disp-6 tablet, R-0Normal  8. Yeast dermatitis  -     fluconazole (DIFLUCAN) 150 MG tablet; Take 1 tablet by mouth once for 1 dose, Disp-1 tablet, R-0Normal  -     nystatin (MYCOSTATIN) 895861 UNIT/GM powder; Apply 3 times daily., Disp-60 g, R-2, Normal  9. Noncompliance by declining intervention or support        Return in about 4 weeks (around 1/31/2024).        Subjective

## 2024-01-16 ENCOUNTER — HOSPITAL ENCOUNTER (OUTPATIENT)
Dept: MRI IMAGING | Age: 84
Discharge: HOME | End: 2024-01-19
Attending: FAMILY MEDICINE

## 2024-01-16 ENCOUNTER — TELEPHONE (OUTPATIENT)
Dept: FAMILY MEDICINE CLINIC | Facility: CLINIC | Age: 84
End: 2024-01-16

## 2024-01-16 DIAGNOSIS — E11.69 TYPE 2 DIABETES MELLITUS WITH OTHER SPECIFIED COMPLICATION, WITH LONG-TERM CURRENT USE OF INSULIN (HCC): ICD-10-CM

## 2024-01-16 DIAGNOSIS — Z79.4 TYPE 2 DIABETES MELLITUS WITH OTHER SPECIFIED COMPLICATION, WITH LONG-TERM CURRENT USE OF INSULIN (HCC): ICD-10-CM

## 2024-01-16 DIAGNOSIS — K86.89 PANCREATIC MASS: ICD-10-CM

## 2024-01-16 RX ORDER — SODIUM CHLORIDE 0.9 % (FLUSH) 0.9 %
10 SYRINGE (ML) INJECTION AS NEEDED
Status: DISCONTINUED | OUTPATIENT
Start: 2024-01-16 | End: 2024-01-20 | Stop reason: HOSPADM

## 2024-01-16 NOTE — TELEPHONE ENCOUNTER
Mr. Galvez wants to speak to you on a situation he had today, 1/16.    He had an MRI scheduled for 1/16 . He states he has gone all day without food and water. When it was time to do the MRI, he was not capable of doing it due to a long period of no food and water. He asked for OJ, but they did not have any. He did drink seven up which causes him not to do the MRI due to the having fluids in his system.    Mr. Galvez states that if there is any exams or tests that last all day with no food and water, he is not capable of doing so.    Please call Mr. Galvez to discuss other options at [phone].

## 2024-01-23 ENCOUNTER — TELEPHONE (OUTPATIENT)
Dept: FAMILY MEDICINE CLINIC | Facility: CLINIC | Age: 84
End: 2024-01-23

## 2024-01-23 NOTE — TELEPHONE ENCOUNTER
Pt is having a hard time regulating his insulin today. This morning it was 60 and this afternoon it was 131. Pt wants you to call to advise

## 2024-01-23 NOTE — TELEPHONE ENCOUNTER
Make sure if sugars drop to have a pack of jorge a crackers and milk--HOW MUCH INSULIN IS HE TAKING AS MAY NEED TO CUT BACK ON DAILY DOSE.

## 2024-01-23 NOTE — TELEPHONE ENCOUNTER
Pt called in stating that he is taking 45 units in the morning and 35 units in the evening.    The read this morning was 60 - around 12 it was 131

## 2024-01-29 ENCOUNTER — HOSPITAL ENCOUNTER (INPATIENT)
Age: 84
LOS: 11 days | Discharge: HOME HEALTH | DRG: 242 | End: 2024-02-09
Attending: EMERGENCY MEDICINE | Admitting: INTERNAL MEDICINE
Payer: MEDICARE

## 2024-01-29 ENCOUNTER — APPOINTMENT (OUTPATIENT)
Dept: CT IMAGING | Age: 84
DRG: 242 | End: 2024-01-29
Payer: MEDICARE

## 2024-01-29 ENCOUNTER — APPOINTMENT (OUTPATIENT)
Dept: ULTRASOUND IMAGING | Age: 84
DRG: 242 | End: 2024-01-29
Payer: MEDICARE

## 2024-01-29 ENCOUNTER — OFFICE VISIT (OUTPATIENT)
Dept: FAMILY MEDICINE CLINIC | Facility: CLINIC | Age: 84
End: 2024-01-29
Payer: MEDICARE

## 2024-01-29 VITALS — DIASTOLIC BLOOD PRESSURE: 70 MMHG | SYSTOLIC BLOOD PRESSURE: 110 MMHG | HEART RATE: 93 BPM

## 2024-01-29 DIAGNOSIS — Z85.51 HISTORY OF BLADDER CANCER: Primary | ICD-10-CM

## 2024-01-29 DIAGNOSIS — N18.32 STAGE 3B CHRONIC KIDNEY DISEASE (CKD) (HCC): ICD-10-CM

## 2024-01-29 DIAGNOSIS — E11.65 UNCONTROLLED TYPE 2 DIABETES MELLITUS WITH HYPERGLYCEMIA (HCC): ICD-10-CM

## 2024-01-29 DIAGNOSIS — N17.9 AKI (ACUTE KIDNEY INJURY) (HCC): Primary | ICD-10-CM

## 2024-01-29 DIAGNOSIS — I48.21 PERMANENT ATRIAL FIBRILLATION (HCC): ICD-10-CM

## 2024-01-29 DIAGNOSIS — I70.202: ICD-10-CM

## 2024-01-29 DIAGNOSIS — I48.91 ATRIAL FIBRILLATION WITH RAPID VENTRICULAR RESPONSE (HCC): ICD-10-CM

## 2024-01-29 DIAGNOSIS — K86.89 MASS OF HEAD OF PANCREAS: ICD-10-CM

## 2024-01-29 DIAGNOSIS — M79.672 PAIN IN BOTH FEET: ICD-10-CM

## 2024-01-29 DIAGNOSIS — M79.671 PAIN IN BOTH FEET: ICD-10-CM

## 2024-01-29 DIAGNOSIS — I70.201 OCCLUSION OF RIGHT TIBIAL ARTERY (HCC): ICD-10-CM

## 2024-01-29 LAB
ALBUMIN SERPL-MCNC: 3.2 G/DL (ref 3.2–4.6)
ALBUMIN/GLOB SERPL: 0.9 (ref 0.4–1.6)
ALP SERPL-CCNC: 133 U/L (ref 50–136)
ALT SERPL-CCNC: 42 U/L (ref 12–65)
ANION GAP SERPL CALC-SCNC: 3 MMOL/L (ref 2–11)
AST SERPL-CCNC: 30 U/L (ref 15–37)
BASOPHILS # BLD: 0 K/UL (ref 0–0.2)
BASOPHILS NFR BLD: 0 % (ref 0–2)
BILIRUB SERPL-MCNC: 1 MG/DL (ref 0.2–1.1)
BUN SERPL-MCNC: 57 MG/DL (ref 8–23)
CALCIUM SERPL-MCNC: 9 MG/DL (ref 8.3–10.4)
CHLORIDE SERPL-SCNC: 109 MMOL/L (ref 103–113)
CO2 SERPL-SCNC: 29 MMOL/L (ref 21–32)
CREAT SERPL-MCNC: 2.4 MG/DL (ref 0.8–1.5)
DIFFERENTIAL METHOD BLD: ABNORMAL
EOSINOPHIL # BLD: 0.1 K/UL (ref 0–0.8)
EOSINOPHIL NFR BLD: 1 % (ref 0.5–7.8)
ERYTHROCYTE [DISTWIDTH] IN BLOOD BY AUTOMATED COUNT: 15.8 % (ref 11.9–14.6)
GLOBULIN SER CALC-MCNC: 3.7 G/DL (ref 2.8–4.5)
GLUCOSE SERPL-MCNC: 216 MG/DL (ref 65–100)
HCT VFR BLD AUTO: 42.1 % (ref 41.1–50.3)
HGB BLD-MCNC: 12.5 G/DL (ref 13.6–17.2)
IMM GRANULOCYTES # BLD AUTO: 0 K/UL (ref 0–0.5)
IMM GRANULOCYTES NFR BLD AUTO: 0 % (ref 0–5)
LACTATE SERPL-SCNC: 1.4 MMOL/L (ref 0.4–2)
LYMPHOCYTES # BLD: 2.7 K/UL (ref 0.5–4.6)
LYMPHOCYTES NFR BLD: 31 % (ref 13–44)
MAGNESIUM SERPL-MCNC: 2.9 MG/DL (ref 1.8–2.4)
MCH RBC QN AUTO: 28.5 PG (ref 26.1–32.9)
MCHC RBC AUTO-ENTMCNC: 29.7 G/DL (ref 31.4–35)
MCV RBC AUTO: 96.1 FL (ref 82–102)
MONOCYTES # BLD: 0.7 K/UL (ref 0.1–1.3)
MONOCYTES NFR BLD: 8 % (ref 4–12)
NEUTS SEG # BLD: 5.2 K/UL (ref 1.7–8.2)
NEUTS SEG NFR BLD: 59 % (ref 43–78)
NRBC # BLD: 0 K/UL (ref 0–0.2)
PLATELET # BLD AUTO: 166 K/UL (ref 150–450)
PMV BLD AUTO: 9.5 FL (ref 9.4–12.3)
POTASSIUM SERPL-SCNC: 5.2 MMOL/L (ref 3.5–5.1)
PROT SERPL-MCNC: 6.9 G/DL (ref 6.3–8.2)
RBC # BLD AUTO: 4.38 M/UL (ref 4.23–5.6)
SODIUM SERPL-SCNC: 141 MMOL/L (ref 136–146)
WBC # BLD AUTO: 8.8 K/UL (ref 4.3–11.1)

## 2024-01-29 PROCEDURE — 1100000000 HC RM PRIVATE

## 2024-01-29 PROCEDURE — 99214 OFFICE O/P EST MOD 30 MIN: CPT | Performed by: FAMILY MEDICINE

## 2024-01-29 PROCEDURE — 84439 ASSAY OF FREE THYROXINE: CPT

## 2024-01-29 PROCEDURE — 99285 EMERGENCY DEPT VISIT HI MDM: CPT

## 2024-01-29 PROCEDURE — 83735 ASSAY OF MAGNESIUM: CPT

## 2024-01-29 PROCEDURE — 85025 COMPLETE CBC W/AUTO DIFF WBC: CPT

## 2024-01-29 PROCEDURE — 93925 LOWER EXTREMITY STUDY: CPT

## 2024-01-29 PROCEDURE — 80053 COMPREHEN METABOLIC PANEL: CPT

## 2024-01-29 PROCEDURE — 84443 ASSAY THYROID STIM HORMONE: CPT

## 2024-01-29 PROCEDURE — 3051F HG A1C>EQUAL 7.0%<8.0%: CPT | Performed by: FAMILY MEDICINE

## 2024-01-29 PROCEDURE — 1123F ACP DISCUSS/DSCN MKR DOCD: CPT | Performed by: FAMILY MEDICINE

## 2024-01-29 PROCEDURE — 83605 ASSAY OF LACTIC ACID: CPT

## 2024-01-29 ASSESSMENT — ENCOUNTER SYMPTOMS
PHOTOPHOBIA: 0
EYE PAIN: 0
SHORTNESS OF BREATH: 0
ABDOMINAL DISTENTION: 0
ABDOMINAL PAIN: 0
SORE THROAT: 0
NAUSEA: 0
BLOOD IN STOOL: 0
COUGH: 0
COLOR CHANGE: 0

## 2024-01-29 ASSESSMENT — LIFESTYLE VARIABLES
HOW MANY STANDARD DRINKS CONTAINING ALCOHOL DO YOU HAVE ON A TYPICAL DAY: PATIENT DOES NOT DRINK
HOW OFTEN DO YOU HAVE A DRINK CONTAINING ALCOHOL: NEVER

## 2024-01-29 NOTE — ED TRIAGE NOTES
Patient arrived per Dr orders. No specific instructions or reason given per patient or family. Pt is poor historian. Denies any medical complaints.

## 2024-01-29 NOTE — ED PROVIDER NOTES
Emergency Department Provider Note       PCP: Miguel Angel Tavares MD   Age: 83 y.o.   Sex: male     DISPOSITION Decision To Admit 01/29/2024 10:29:39 PM       ICD-10-CM    1. JUANCARLOS (acute kidney injury) (HCC)  N17.9       2. Occlusion of left peroneal artery (HCC)  I70.202       3. Occlusion of right tibial artery (HCC)  I70.201           Medical Decision Making     Complexity of Problems Addressed:  1 or more chronic illnesses with a severe exacerbation or progression.    Data Reviewed and Analyzed:   I independently ordered and reviewed each unique test.  I reviewed external records: provider visit note from PCP.  I reviewed external records: provider visit note from outside specialist.   The patients assessment required an independent historian: the patient's son.  The reason they were needed is important historical information not provided by the patient.      Discussion of management or test interpretation.  This patient is an 83-year-old male with a history of atrial fibrillation, bladder cancer, hypertension, PVD, PAD, and diabetes who presents today with his son from his primary care doctor's office.  The patient was originally not aware of why he was told to come here today however through further conversation, he does admit to increased pain on the soles of his feet bilaterally over the last several days.  Physical exam the patient shows significant changes to his lower limbs secondary to PVD and PAD without signs of secondary infection.  Patient denies any abdominal pain or other GI symptoms despite a note in patient's chart from primary care today stating that he is concerned for pancreatic cancer due to a mass noted on patient's pancreas and a previous CT scan.  His CBC today is unremarkable.  CMP is consistent with an JUANCARLOS.  CTA was originally ordered for his lower limbs bilaterally however due to the patient's renal function, Doppler ultrasound was performed instead.  This showed no flow to the

## 2024-01-30 PROBLEM — E66.9 OBESITY: Status: ACTIVE | Noted: 2024-01-30

## 2024-01-30 PROBLEM — I48.91 HYPERCOAGULABILITY DUE TO ATRIAL FIBRILLATION (HCC): Status: ACTIVE | Noted: 2024-01-30

## 2024-01-30 PROBLEM — I25.10 CAD (CORONARY ARTERY DISEASE): Status: ACTIVE | Noted: 2024-01-30

## 2024-01-30 PROBLEM — I70.202: Status: ACTIVE | Noted: 2024-01-30

## 2024-01-30 PROBLEM — N18.32 STAGE 3B CHRONIC KIDNEY DISEASE (HCC): Status: ACTIVE | Noted: 2024-01-30

## 2024-01-30 PROBLEM — L97.909 LEG ULCER (HCC): Status: ACTIVE | Noted: 2024-01-30

## 2024-01-30 PROBLEM — E87.5 HYPERKALEMIA: Status: ACTIVE | Noted: 2024-01-30

## 2024-01-30 PROBLEM — E66.813 CLASS 3 SEVERE OBESITY WITH BODY MASS INDEX (BMI) OF 40.0 TO 44.9 IN ADULT: Status: ACTIVE | Noted: 2024-01-30

## 2024-01-30 PROBLEM — I48.91 ATRIAL FIBRILLATION WITH RAPID VENTRICULAR RESPONSE (HCC): Status: ACTIVE | Noted: 2024-01-30

## 2024-01-30 PROBLEM — I73.9 PAD (PERIPHERAL ARTERY DISEASE) (HCC): Status: ACTIVE | Noted: 2024-01-30

## 2024-01-30 PROBLEM — D68.69 HYPERCOAGULABILITY DUE TO ATRIAL FIBRILLATION (HCC): Status: ACTIVE | Noted: 2024-01-30

## 2024-01-30 PROBLEM — I48.21 PERMANENT ATRIAL FIBRILLATION (HCC): Status: ACTIVE | Noted: 2024-01-30

## 2024-01-30 PROBLEM — E66.01 CLASS 3 SEVERE OBESITY WITH BODY MASS INDEX (BMI) OF 40.0 TO 44.9 IN ADULT (HCC): Status: ACTIVE | Noted: 2024-01-30

## 2024-01-30 PROBLEM — I48.91 ATRIAL FIBRILLATION WITH RVR (HCC): Status: ACTIVE | Noted: 2024-01-30

## 2024-01-30 LAB
ANION GAP SERPL CALC-SCNC: 1 MMOL/L (ref 2–11)
APTT PPP: 32.9 SEC (ref 23.3–37.4)
APTT PPP: 46.5 SEC (ref 23.3–37.4)
BASOPHILS # BLD: 0.1 K/UL (ref 0–0.2)
BASOPHILS NFR BLD: 1 % (ref 0–2)
BUN SERPL-MCNC: 57 MG/DL (ref 8–23)
CALCIUM SERPL-MCNC: 8.7 MG/DL (ref 8.3–10.4)
CHLORIDE SERPL-SCNC: 111 MMOL/L (ref 103–113)
CO2 SERPL-SCNC: 29 MMOL/L (ref 21–32)
CREAT SERPL-MCNC: 2.4 MG/DL (ref 0.8–1.5)
DIFFERENTIAL METHOD BLD: ABNORMAL
EOSINOPHIL # BLD: 0.2 K/UL (ref 0–0.8)
EOSINOPHIL NFR BLD: 2 % (ref 0.5–7.8)
ERYTHROCYTE [DISTWIDTH] IN BLOOD BY AUTOMATED COUNT: 15.8 % (ref 11.9–14.6)
GLUCOSE BLD STRIP.AUTO-MCNC: 214 MG/DL (ref 65–100)
GLUCOSE BLD STRIP.AUTO-MCNC: 219 MG/DL (ref 65–100)
GLUCOSE BLD STRIP.AUTO-MCNC: 229 MG/DL (ref 65–100)
GLUCOSE BLD STRIP.AUTO-MCNC: 233 MG/DL (ref 65–100)
GLUCOSE BLD STRIP.AUTO-MCNC: 248 MG/DL (ref 65–100)
GLUCOSE BLD STRIP.AUTO-MCNC: 280 MG/DL (ref 65–100)
GLUCOSE SERPL-MCNC: 250 MG/DL (ref 65–100)
HCT VFR BLD AUTO: 40.9 % (ref 41.1–50.3)
HGB BLD-MCNC: 12.3 G/DL (ref 13.6–17.2)
IMM GRANULOCYTES # BLD AUTO: 0 K/UL (ref 0–0.5)
IMM GRANULOCYTES NFR BLD AUTO: 0 % (ref 0–5)
LYMPHOCYTES # BLD: 3.1 K/UL (ref 0.5–4.6)
LYMPHOCYTES NFR BLD: 33 % (ref 13–44)
MAGNESIUM SERPL-MCNC: 2.7 MG/DL (ref 1.8–2.4)
MCH RBC QN AUTO: 28.3 PG (ref 26.1–32.9)
MCHC RBC AUTO-ENTMCNC: 30.1 G/DL (ref 31.4–35)
MCV RBC AUTO: 94 FL (ref 82–102)
MONOCYTES # BLD: 0.7 K/UL (ref 0.1–1.3)
MONOCYTES NFR BLD: 8 % (ref 4–12)
NEUTS SEG # BLD: 5.3 K/UL (ref 1.7–8.2)
NEUTS SEG NFR BLD: 57 % (ref 43–78)
NRBC # BLD: 0 K/UL (ref 0–0.2)
PLATELET # BLD AUTO: 168 K/UL (ref 150–450)
PMV BLD AUTO: 9.8 FL (ref 9.4–12.3)
POTASSIUM SERPL-SCNC: 5.3 MMOL/L (ref 3.5–5.1)
RBC # BLD AUTO: 4.35 M/UL (ref 4.23–5.6)
SARS-COV-2 RDRP RESP QL NAA+PROBE: NOT DETECTED
SERVICE CMNT-IMP: ABNORMAL
SODIUM SERPL-SCNC: 141 MMOL/L (ref 136–146)
SOURCE: NORMAL
T4 FREE SERPL-MCNC: 1.1 NG/DL (ref 0.78–1.46)
TSH W FREE THYROID IF ABNORMAL: 4.48 UIU/ML (ref 0.36–3.74)
UFH PPP CHRO-ACNC: >1.1 IU/ML (ref 0.3–0.7)
UFH PPP CHRO-ACNC: >1.1 IU/ML (ref 0.3–0.7)
WBC # BLD AUTO: 9.3 K/UL (ref 4.3–11.1)

## 2024-01-30 PROCEDURE — 85025 COMPLETE CBC W/AUTO DIFF WBC: CPT

## 2024-01-30 PROCEDURE — 97165 OT EVAL LOW COMPLEX 30 MIN: CPT

## 2024-01-30 PROCEDURE — 84300 ASSAY OF URINE SODIUM: CPT

## 2024-01-30 PROCEDURE — 97162 PT EVAL MOD COMPLEX 30 MIN: CPT

## 2024-01-30 PROCEDURE — 2500000003 HC RX 250 WO HCPCS: Performed by: INTERNAL MEDICINE

## 2024-01-30 PROCEDURE — 87635 SARS-COV-2 COVID-19 AMP PRB: CPT

## 2024-01-30 PROCEDURE — 83735 ASSAY OF MAGNESIUM: CPT

## 2024-01-30 PROCEDURE — 2700000000 HC OXYGEN THERAPY PER DAY

## 2024-01-30 PROCEDURE — 85520 HEPARIN ASSAY: CPT

## 2024-01-30 PROCEDURE — 81001 URINALYSIS AUTO W/SCOPE: CPT

## 2024-01-30 PROCEDURE — 2580000003 HC RX 258: Performed by: INTERNAL MEDICINE

## 2024-01-30 PROCEDURE — 82570 ASSAY OF URINE CREATININE: CPT

## 2024-01-30 PROCEDURE — 6360000002 HC RX W HCPCS: Performed by: FAMILY MEDICINE

## 2024-01-30 PROCEDURE — 84156 ASSAY OF PROTEIN URINE: CPT

## 2024-01-30 PROCEDURE — 82962 GLUCOSE BLOOD TEST: CPT

## 2024-01-30 PROCEDURE — 85730 THROMBOPLASTIN TIME PARTIAL: CPT

## 2024-01-30 PROCEDURE — 2140000000 HC CCU INTERMEDIATE R&B

## 2024-01-30 PROCEDURE — 80048 BASIC METABOLIC PNL TOTAL CA: CPT

## 2024-01-30 PROCEDURE — 99223 1ST HOSP IP/OBS HIGH 75: CPT | Performed by: INTERNAL MEDICINE

## 2024-01-30 PROCEDURE — 36415 COLL VENOUS BLD VENIPUNCTURE: CPT

## 2024-01-30 PROCEDURE — 6370000000 HC RX 637 (ALT 250 FOR IP): Performed by: INTERNAL MEDICINE

## 2024-01-30 PROCEDURE — 97530 THERAPEUTIC ACTIVITIES: CPT

## 2024-01-30 PROCEDURE — 2580000003 HC RX 258: Performed by: FAMILY MEDICINE

## 2024-01-30 PROCEDURE — 93005 ELECTROCARDIOGRAM TRACING: CPT | Performed by: INTERNAL MEDICINE

## 2024-01-30 PROCEDURE — 97112 NEUROMUSCULAR REEDUCATION: CPT

## 2024-01-30 RX ORDER — HEPARIN SODIUM 1000 [USP'U]/ML
30 INJECTION, SOLUTION INTRAVENOUS; SUBCUTANEOUS PRN
Status: DISCONTINUED | OUTPATIENT
Start: 2024-01-30 | End: 2024-01-30

## 2024-01-30 RX ORDER — SODIUM CHLORIDE 9 MG/ML
INJECTION, SOLUTION INTRAVENOUS PRN
Status: DISCONTINUED | OUTPATIENT
Start: 2024-01-30 | End: 2024-02-09 | Stop reason: HOSPADM

## 2024-01-30 RX ORDER — ROSUVASTATIN CALCIUM 20 MG/1
10 TABLET, COATED ORAL DAILY
Status: DISCONTINUED | OUTPATIENT
Start: 2024-01-30 | End: 2024-02-09 | Stop reason: HOSPADM

## 2024-01-30 RX ORDER — HEPARIN SODIUM 10000 [USP'U]/100ML
5-30 INJECTION, SOLUTION INTRAVENOUS CONTINUOUS
Status: DISCONTINUED | OUTPATIENT
Start: 2024-01-30 | End: 2024-01-31

## 2024-01-30 RX ORDER — HEPARIN SODIUM 10000 [USP'U]/100ML
5-30 INJECTION, SOLUTION INTRAVENOUS CONTINUOUS
Status: DISCONTINUED | OUTPATIENT
Start: 2024-01-30 | End: 2024-01-30

## 2024-01-30 RX ORDER — INSULIN LISPRO 100 [IU]/ML
0-8 INJECTION, SOLUTION INTRAVENOUS; SUBCUTANEOUS
Status: DISCONTINUED | OUTPATIENT
Start: 2024-01-30 | End: 2024-02-09 | Stop reason: HOSPADM

## 2024-01-30 RX ORDER — SODIUM CHLORIDE 0.9 % (FLUSH) 0.9 %
5-40 SYRINGE (ML) INJECTION EVERY 12 HOURS SCHEDULED
Status: DISCONTINUED | OUTPATIENT
Start: 2024-01-30 | End: 2024-02-05

## 2024-01-30 RX ORDER — TAMSULOSIN HYDROCHLORIDE 0.4 MG/1
0.4 CAPSULE ORAL DAILY
Status: DISCONTINUED | OUTPATIENT
Start: 2024-01-30 | End: 2024-02-09 | Stop reason: HOSPADM

## 2024-01-30 RX ORDER — HEPARIN SODIUM 1000 [USP'U]/ML
60 INJECTION, SOLUTION INTRAVENOUS; SUBCUTANEOUS PRN
Status: DISCONTINUED | OUTPATIENT
Start: 2024-01-30 | End: 2024-01-30

## 2024-01-30 RX ORDER — INSULIN LISPRO 100 [IU]/ML
0-4 INJECTION, SOLUTION INTRAVENOUS; SUBCUTANEOUS NIGHTLY
Status: DISCONTINUED | OUTPATIENT
Start: 2024-01-30 | End: 2024-02-09 | Stop reason: HOSPADM

## 2024-01-30 RX ORDER — HEPARIN SODIUM 1000 [USP'U]/ML
2000 INJECTION, SOLUTION INTRAVENOUS; SUBCUTANEOUS PRN
Status: DISCONTINUED | OUTPATIENT
Start: 2024-01-30 | End: 2024-01-31 | Stop reason: ALTCHOICE

## 2024-01-30 RX ORDER — POLYETHYLENE GLYCOL 3350 17 G/17G
17 POWDER, FOR SOLUTION ORAL DAILY PRN
Status: DISCONTINUED | OUTPATIENT
Start: 2024-01-30 | End: 2024-01-31 | Stop reason: SDUPTHER

## 2024-01-30 RX ORDER — ONDANSETRON 4 MG/1
4 TABLET, ORALLY DISINTEGRATING ORAL EVERY 8 HOURS PRN
Status: DISCONTINUED | OUTPATIENT
Start: 2024-01-30 | End: 2024-01-30

## 2024-01-30 RX ORDER — ACETAMINOPHEN 325 MG/1
650 TABLET ORAL EVERY 6 HOURS PRN
Status: DISCONTINUED | OUTPATIENT
Start: 2024-01-30 | End: 2024-01-31 | Stop reason: SDUPTHER

## 2024-01-30 RX ORDER — HEPARIN SODIUM 1000 [USP'U]/ML
60 INJECTION, SOLUTION INTRAVENOUS; SUBCUTANEOUS ONCE
Status: DISCONTINUED | OUTPATIENT
Start: 2024-01-30 | End: 2024-01-30

## 2024-01-30 RX ORDER — SODIUM CHLORIDE 9 MG/ML
INJECTION, SOLUTION INTRAVENOUS CONTINUOUS
Status: DISCONTINUED | OUTPATIENT
Start: 2024-01-30 | End: 2024-01-31

## 2024-01-30 RX ORDER — SODIUM CHLORIDE 0.9 % (FLUSH) 0.9 %
5-40 SYRINGE (ML) INJECTION PRN
Status: DISCONTINUED | OUTPATIENT
Start: 2024-01-30 | End: 2024-02-09 | Stop reason: HOSPADM

## 2024-01-30 RX ORDER — ONDANSETRON 2 MG/ML
4 INJECTION INTRAMUSCULAR; INTRAVENOUS EVERY 6 HOURS PRN
Status: DISCONTINUED | OUTPATIENT
Start: 2024-01-30 | End: 2024-01-30

## 2024-01-30 RX ORDER — INSULIN GLARGINE 100 [IU]/ML
35 INJECTION, SOLUTION SUBCUTANEOUS DAILY
Status: DISCONTINUED | OUTPATIENT
Start: 2024-01-30 | End: 2024-02-02

## 2024-01-30 RX ORDER — METOPROLOL TARTRATE 1 MG/ML
5 INJECTION, SOLUTION INTRAVENOUS EVERY 5 MIN PRN
Status: DISCONTINUED | OUTPATIENT
Start: 2024-01-30 | End: 2024-02-09 | Stop reason: HOSPADM

## 2024-01-30 RX ORDER — ASPIRIN 81 MG/1
81 TABLET, CHEWABLE ORAL DAILY
Status: DISCONTINUED | OUTPATIENT
Start: 2024-01-30 | End: 2024-02-09 | Stop reason: HOSPADM

## 2024-01-30 RX ORDER — INSULIN GLARGINE 100 [IU]/ML
25 INJECTION, SOLUTION SUBCUTANEOUS NIGHTLY
Status: DISCONTINUED | OUTPATIENT
Start: 2024-01-30 | End: 2024-02-02

## 2024-01-30 RX ORDER — CARVEDILOL 6.25 MG/1
6.25 TABLET ORAL 2 TIMES DAILY WITH MEALS
Status: DISCONTINUED | OUTPATIENT
Start: 2024-01-30 | End: 2024-01-31

## 2024-01-30 RX ORDER — DILTIAZEM HYDROCHLORIDE 5 MG/ML
10 INJECTION INTRAVENOUS ONCE
Status: COMPLETED | OUTPATIENT
Start: 2024-01-30 | End: 2024-01-30

## 2024-01-30 RX ORDER — HEPARIN SODIUM 1000 [USP'U]/ML
4000 INJECTION, SOLUTION INTRAVENOUS; SUBCUTANEOUS PRN
Status: DISCONTINUED | OUTPATIENT
Start: 2024-01-30 | End: 2024-01-31 | Stop reason: ALTCHOICE

## 2024-01-30 RX ADMIN — INSULIN LISPRO 2 UNITS: 100 INJECTION, SOLUTION INTRAVENOUS; SUBCUTANEOUS at 17:30

## 2024-01-30 RX ADMIN — DILTIAZEM HYDROCHLORIDE 10 MG: 5 INJECTION, SOLUTION INTRAVENOUS at 01:23

## 2024-01-30 RX ADMIN — METOPROLOL TARTRATE 5 MG: 5 INJECTION INTRAVENOUS at 00:53

## 2024-01-30 RX ADMIN — METOPROLOL TARTRATE 5 MG: 5 INJECTION INTRAVENOUS at 00:28

## 2024-01-30 RX ADMIN — AMIODARONE HYDROCHLORIDE 150 MG: 50 INJECTION, SOLUTION INTRAVENOUS at 06:07

## 2024-01-30 RX ADMIN — AMIODARONE HYDROCHLORIDE 1 MG/MIN: 50 INJECTION, SOLUTION INTRAVENOUS at 06:22

## 2024-01-30 RX ADMIN — INSULIN GLARGINE 25 UNITS: 100 INJECTION, SOLUTION SUBCUTANEOUS at 21:04

## 2024-01-30 RX ADMIN — SODIUM CHLORIDE 5 MG/HR: 900 INJECTION, SOLUTION INTRAVENOUS at 01:30

## 2024-01-30 RX ADMIN — TAMSULOSIN HYDROCHLORIDE 0.4 MG: 0.4 CAPSULE ORAL at 09:08

## 2024-01-30 RX ADMIN — SODIUM CHLORIDE: 9 INJECTION, SOLUTION INTRAVENOUS at 17:27

## 2024-01-30 RX ADMIN — CARVEDILOL 6.25 MG: 6.25 TABLET, FILM COATED ORAL at 09:08

## 2024-01-30 RX ADMIN — INSULIN LISPRO 2 UNITS: 100 INJECTION, SOLUTION INTRAVENOUS; SUBCUTANEOUS at 11:19

## 2024-01-30 RX ADMIN — ASPIRIN 81 MG 81 MG: 81 TABLET ORAL at 09:08

## 2024-01-30 RX ADMIN — HEPARIN SODIUM 7 UNITS/KG/HR: 10000 INJECTION, SOLUTION INTRAVENOUS at 09:17

## 2024-01-30 RX ADMIN — SODIUM CHLORIDE, PRESERVATIVE FREE 10 ML: 5 INJECTION INTRAVENOUS at 21:00

## 2024-01-30 RX ADMIN — INSULIN GLARGINE 35 UNITS: 100 INJECTION, SOLUTION SUBCUTANEOUS at 09:18

## 2024-01-30 RX ADMIN — TUBERCULIN PURIFIED PROTEIN DERIVATIVE 5 UNITS: 5 INJECTION, SOLUTION INTRADERMAL at 03:28

## 2024-01-30 RX ADMIN — SODIUM CHLORIDE: 9 INJECTION, SOLUTION INTRAVENOUS at 03:08

## 2024-01-30 RX ADMIN — ROSUVASTATIN CALCIUM 10 MG: 20 TABLET, FILM COATED ORAL at 09:08

## 2024-01-30 RX ADMIN — SODIUM CHLORIDE, PRESERVATIVE FREE 10 ML: 5 INJECTION INTRAVENOUS at 09:21

## 2024-01-30 RX ADMIN — CARVEDILOL 6.25 MG: 6.25 TABLET, FILM COATED ORAL at 17:30

## 2024-01-30 RX ADMIN — INSULIN LISPRO 2 UNITS: 100 INJECTION, SOLUTION INTRAVENOUS; SUBCUTANEOUS at 09:29

## 2024-01-30 ASSESSMENT — PAIN SCALES - GENERAL
PAINLEVEL_OUTOF10: 0

## 2024-01-30 NOTE — CARE COORDINATION
Patient resting at time of assessment. CM reached out to patient's spouse per patient's request. CM called Cindy Galvez, [phone], for this assessment. Cindy reports: Patient lives with spouse in a 1 story house and has 1 step to enter house. Patient is independent with ADLS, uses a cane (mostly) and walker, and does not drive. Patient does not have any home health care at this time. Patient's PCP information was confirmed and last PCP visit was yesterday.     CM informed Cindy PT is recommending STR. Cindy stated she is not sure if patient will want to go to STR or want to go home with home health care. CM to follow up with patient.     No CM needs voiced or noted. CM will continue follow patient for discharge planning needs.        01/30/24 4101   Service Assessment   Patient Orientation Alert and Oriented   Cognition Alert   History Provided By Patient;Spouse   Primary Caregiver Self   Support Systems Spouse/Significant Other   Patient's Healthcare Decision Maker is: Legal Next of Kin   PCP Verified by CM Yes  (confirmed PCP is Dr. Miguel Angel Tavares)   Last Visit to PCP Within last 3 months  (last PCP visit was yesterday)   Prior Functional Level Independent in ADLs/IADLs   Current Functional Level Other (see comment)  (TBD by clinicals)   Can patient return to prior living arrangement Yes   Ability to make needs known: Good   Family able to assist with home care needs: Yes   Would you like for me to discuss the discharge plan with any other family members/significant others, and if so, who? Yes   Financial Resources Medicare;Other (Comment)  ([facility] Medicare)   Community Resources None   CM/SW Referral Other (see comment)  (discharge planning)   Social/Functional History   Lives With Spouse   Type of Home House   Home Layout One level   Home Access Stairs to enter without rails   Entrance Stairs - Number of Steps 1   Bathroom Shower/Tub Walk-in shower;Shower chair with back   Bathroom Equipment Shower

## 2024-01-30 NOTE — ED NOTES
TRANSFER - OUT REPORT:    Verbal report given to Britt MILLS on Kiran Galvez  being transferred to [facility] for routine progression of patient care       Report consisted of patient's Situation, Background, Assessment and   Recommendations(SBAR).     Information from the following report(s) Nurse Handoff Report was reviewed with the receiving nurse.    Luanne Fall Assessment:    Presents to emergency department  because of falls (Syncope, seizure, or loss of consciousness): No  Age > 70: Yes  Altered Mental Status, Intoxication with alcohol or substance confusion (Disorientation, impaired judgment, poor safety awaremess, or inability to follow instructions): No  Impaired Mobility: Ambulates or transfers with assistive devices or assistance; Unable to ambulate or transer.: Yes  Nursing Judgement: Yes          Lines:   Peripheral IV 01/29/24 Right Antecubital (Active)   Site Assessment Clean, dry & intact 01/29/24 1643   Line Status Blood return noted;Flushed 01/29/24 1643   Phlebitis Assessment No symptoms 01/29/24 1643   Infiltration Assessment 0 01/29/24 1643   Alcohol Cap Used No 01/29/24 1643   Dressing Status New dressing applied 01/29/24 1643   Dressing Type Transparent 01/29/24 1643   Dressing Intervention New 01/29/24 1643        Opportunity for questions and clarification was provided.      Patient transported with:  Registered Nurse           David Moss RN  01/30/24 5871

## 2024-01-31 ENCOUNTER — ANESTHESIA (OUTPATIENT)
Dept: CARDIAC CATH/INVASIVE PROCEDURES | Age: 84
End: 2024-01-31
Payer: MEDICARE

## 2024-01-31 ENCOUNTER — ANESTHESIA EVENT (OUTPATIENT)
Dept: CARDIAC CATH/INVASIVE PROCEDURES | Age: 84
End: 2024-01-31
Payer: MEDICARE

## 2024-01-31 ENCOUNTER — APPOINTMENT (OUTPATIENT)
Dept: GENERAL RADIOLOGY | Age: 84
DRG: 242 | End: 2024-01-31
Payer: MEDICARE

## 2024-01-31 ENCOUNTER — APPOINTMENT (OUTPATIENT)
Dept: NON INVASIVE DIAGNOSTICS | Age: 84
DRG: 242 | End: 2024-01-31
Attending: INTERNAL MEDICINE
Payer: MEDICARE

## 2024-01-31 LAB
ALBUMIN SERPL-MCNC: 2.9 G/DL (ref 3.2–4.6)
ANION GAP SERPL CALC-SCNC: 1 MMOL/L (ref 2–11)
APPEARANCE UR: CLEAR
APTT PPP: 108.3 SEC (ref 23.3–37.4)
BACTERIA URNS QL MICRO: ABNORMAL /HPF
BILIRUB UR QL: NEGATIVE
BUN SERPL-MCNC: 55 MG/DL (ref 8–23)
CALCIUM SERPL-MCNC: 8.1 MG/DL (ref 8.3–10.4)
CASTS URNS QL MICRO: ABNORMAL /LPF
CHLORIDE SERPL-SCNC: 114 MMOL/L (ref 103–113)
CO2 SERPL-SCNC: 26 MMOL/L (ref 21–32)
COLOR UR: ABNORMAL
CREAT SERPL-MCNC: 2.2 MG/DL (ref 0.8–1.5)
CREAT UR-MCNC: 145 MG/DL
ECHO AO ASC DIAM: 3.5 CM
ECHO AO ASCENDING AORTA INDEX: 1.47 CM/M2
ECHO AO ROOT DIAM: 3.4 CM
ECHO AO ROOT INDEX: 1.43 CM/M2
ECHO AV AREA PEAK VELOCITY: 1.7 CM2
ECHO AV AREA VTI: 1.8 CM2
ECHO AV AREA/BSA PEAK VELOCITY: 0.7 CM2/M2
ECHO AV AREA/BSA VTI: 0.8 CM2/M2
ECHO AV MEAN GRADIENT: 3 MMHG
ECHO AV MEAN VELOCITY: 0.8 M/S
ECHO AV PEAK GRADIENT: 5 MMHG
ECHO AV PEAK VELOCITY: 1.1 M/S
ECHO AV VELOCITY RATIO: 0.55
ECHO AV VTI: 18.4 CM
ECHO BSA: 2.35 M2
ECHO BSA: 2.35 M2
ECHO EST RA PRESSURE: 15 MMHG
ECHO IVC PROX: 2.5 CM
ECHO LA AREA 2C: 25.1 CM2
ECHO LA AREA 4C: 29.1 CM2
ECHO LA DIAMETER INDEX: 1.76 CM/M2
ECHO LA DIAMETER: 4.2 CM
ECHO LA MAJOR AXIS: 7.3 CM
ECHO LA MINOR AXIS: 5.8 CM
ECHO LA TO AORTIC ROOT RATIO: 1.24
ECHO LA VOL BP: 107 ML (ref 18–58)
ECHO LA VOL MOD A2C: 94 ML (ref 18–58)
ECHO LA VOL MOD A4C: 98 ML (ref 18–58)
ECHO LA VOL/BSA BIPLANE: 45 ML/M2 (ref 16–34)
ECHO LA VOLUME INDEX MOD A2C: 39 ML/M2 (ref 16–34)
ECHO LA VOLUME INDEX MOD A4C: 41 ML/M2 (ref 16–34)
ECHO LV E' LATERAL VELOCITY: 9 CM/S
ECHO LV E' SEPTAL VELOCITY: 9 CM/S
ECHO LV FRACTIONAL SHORTENING: 13 % (ref 28–44)
ECHO LV INTERNAL DIMENSION DIASTOLE INDEX: 2.31 CM/M2
ECHO LV INTERNAL DIMENSION DIASTOLIC: 5.5 CM (ref 4.2–5.9)
ECHO LV INTERNAL DIMENSION SYSTOLIC INDEX: 2.02 CM/M2
ECHO LV INTERNAL DIMENSION SYSTOLIC: 4.8 CM
ECHO LV IVSD: 1.2 CM (ref 0.6–1)
ECHO LV MASS 2D: 272.4 G (ref 88–224)
ECHO LV MASS INDEX 2D: 114.4 G/M2 (ref 49–115)
ECHO LV POSTERIOR WALL DIASTOLIC: 1.2 CM (ref 0.6–1)
ECHO LV RELATIVE WALL THICKNESS RATIO: 0.44
ECHO LVOT AREA: 3.1 CM2
ECHO LVOT AV VTI INDEX: 0.58
ECHO LVOT DIAM: 2 CM
ECHO LVOT MEAN GRADIENT: 1 MMHG
ECHO LVOT PEAK GRADIENT: 1 MMHG
ECHO LVOT PEAK VELOCITY: 0.6 M/S
ECHO LVOT STROKE VOLUME INDEX: 14 ML/M2
ECHO LVOT SV: 33.3 ML
ECHO LVOT VTI: 10.6 CM
ECHO MV E DECELERATION TIME (DT): 210 MS
ECHO MV E VELOCITY: 0.81 M/S
ECHO MV E/E' LATERAL: 9
ECHO MV E/E' RATIO (AVERAGED): 9
ECHO MV REGURGITANT PEAK GRADIENT: 77 MMHG
ECHO MV REGURGITANT PEAK VELOCITY: 4.4 M/S
ECHO MV REGURGITANT VTIA: 109 CM
ECHO PV ACCELERATION TIME (AT): 71 MS
ECHO PV MAX VELOCITY: 0.6 M/S
ECHO PV PEAK GRADIENT: 1 MMHG
ECHO RV BASAL DIMENSION: 4.5 CM
ECHO RV FREE WALL PEAK S': 7 CM/S
ECHO RV INTERNAL DIMENSION: 3.6 CM
ECHO RV TAPSE: 1.2 CM (ref 1.7–?)
EKG ATRIAL RATE: 0 BPM
EKG DIAGNOSIS: NORMAL
EKG Q-T INTERVAL: 328 MS
EKG QRS DURATION: 69 MS
EKG QTC CALCULATION (BAZETT): 497 MS
EKG R AXIS: 85 DEGREES
EKG VENTRICULAR RATE: 138 BPM
EPI CELLS #/AREA URNS HPF: ABNORMAL /HPF
ERYTHROCYTE [DISTWIDTH] IN BLOOD BY AUTOMATED COUNT: 15.7 % (ref 11.9–14.6)
GLUCOSE BLD STRIP.AUTO-MCNC: 119 MG/DL (ref 65–100)
GLUCOSE BLD STRIP.AUTO-MCNC: 121 MG/DL (ref 65–100)
GLUCOSE BLD STRIP.AUTO-MCNC: 140 MG/DL (ref 65–100)
GLUCOSE BLD STRIP.AUTO-MCNC: 95 MG/DL (ref 65–100)
GLUCOSE SERPL-MCNC: 124 MG/DL (ref 65–100)
GLUCOSE UR STRIP.AUTO-MCNC: NEGATIVE MG/DL
HCT VFR BLD AUTO: 39.2 % (ref 41.1–50.3)
HGB BLD-MCNC: 11.7 G/DL (ref 13.6–17.2)
HGB UR QL STRIP: NEGATIVE
INR PPP: 1.4
KETONES UR QL STRIP.AUTO: NEGATIVE MG/DL
LEUKOCYTE ESTERASE UR QL STRIP.AUTO: ABNORMAL
MCH RBC QN AUTO: 29.1 PG (ref 26.1–32.9)
MCHC RBC AUTO-ENTMCNC: 29.8 G/DL (ref 31.4–35)
MCV RBC AUTO: 97.5 FL (ref 82–102)
NITRITE UR QL STRIP.AUTO: NEGATIVE
NRBC # BLD: 0 K/UL (ref 0–0.2)
OTHER OBSERVATIONS: ABNORMAL
PH UR STRIP: 5 (ref 5–9)
PHOSPHATE SERPL-MCNC: 4.3 MG/DL (ref 2.3–3.7)
PLATELET # BLD AUTO: 142 K/UL (ref 150–450)
PMV BLD AUTO: 9.9 FL (ref 9.4–12.3)
POTASSIUM SERPL-SCNC: 4.9 MMOL/L (ref 3.5–5.1)
PROT UR STRIP-MCNC: 30 MG/DL
PROT UR-MCNC: 48 MG/DL
PROT/CREAT UR-RTO: 0.3
PROTHROMBIN TIME: 17.7 SEC (ref 11.3–14.9)
RBC # BLD AUTO: 4.02 M/UL (ref 4.23–5.6)
RBC #/AREA URNS HPF: ABNORMAL /HPF
SERVICE CMNT-IMP: ABNORMAL
SERVICE CMNT-IMP: NORMAL
SODIUM SERPL-SCNC: 141 MMOL/L (ref 136–146)
SODIUM UR-SCNC: 11 MMOL/L
SP GR UR REFRACTOMETRY: 1.02 (ref 1–1.02)
UFH PPP CHRO-ACNC: >1.1 IU/ML (ref 0.3–0.7)
UROBILINOGEN UR QL STRIP.AUTO: 0.2 EU/DL (ref 0.2–1)
WBC # BLD AUTO: 9.5 K/UL (ref 4.3–11.1)
WBC URNS QL MICRO: ABNORMAL /HPF

## 2024-01-31 PROCEDURE — 82962 GLUCOSE BLOOD TEST: CPT

## 2024-01-31 PROCEDURE — A4217 STERILE WATER/SALINE, 500 ML: HCPCS | Performed by: INTERNAL MEDICINE

## 2024-01-31 PROCEDURE — 6370000000 HC RX 637 (ALT 250 FOR IP): Performed by: INTERNAL MEDICINE

## 2024-01-31 PROCEDURE — 02K83ZZ MAP CONDUCTION MECHANISM, PERCUTANEOUS APPROACH: ICD-10-PCS | Performed by: INTERNAL MEDICINE

## 2024-01-31 PROCEDURE — C1760 CLOSURE DEV, VASC: HCPCS | Performed by: INTERNAL MEDICINE

## 2024-01-31 PROCEDURE — C1769 GUIDE WIRE: HCPCS | Performed by: INTERNAL MEDICINE

## 2024-01-31 PROCEDURE — 6360000002 HC RX W HCPCS: Performed by: INTERNAL MEDICINE

## 2024-01-31 PROCEDURE — 33225 L VENTRIC PACING LEAD ADD-ON: CPT | Performed by: INTERNAL MEDICINE

## 2024-01-31 PROCEDURE — C1900 LEAD, CORONARY VENOUS: HCPCS | Performed by: INTERNAL MEDICINE

## 2024-01-31 PROCEDURE — 2580000003 HC RX 258: Performed by: NURSE ANESTHETIST, CERTIFIED REGISTERED

## 2024-01-31 PROCEDURE — 2700000000 HC OXYGEN THERAPY PER DAY

## 2024-01-31 PROCEDURE — 85027 COMPLETE CBC AUTOMATED: CPT

## 2024-01-31 PROCEDURE — 2580000003 HC RX 258: Performed by: INTERNAL MEDICINE

## 2024-01-31 PROCEDURE — 0JH607Z INSERTION OF CARDIAC RESYNCHRONIZATION PACEMAKER PULSE GENERATOR INTO CHEST SUBCUTANEOUS TISSUE AND FASCIA, OPEN APPROACH: ICD-10-PCS | Performed by: INTERNAL MEDICINE

## 2024-01-31 PROCEDURE — 85520 HEPARIN ASSAY: CPT

## 2024-01-31 PROCEDURE — 6360000002 HC RX W HCPCS: Performed by: NURSE ANESTHETIST, CERTIFIED REGISTERED

## 2024-01-31 PROCEDURE — 2100000001 HC CVICU R&B

## 2024-01-31 PROCEDURE — 6360000004 HC RX CONTRAST MEDICATION: Performed by: INTERNAL MEDICINE

## 2024-01-31 PROCEDURE — 02HK3JZ INSERTION OF PACEMAKER LEAD INTO RIGHT VENTRICLE, PERCUTANEOUS APPROACH: ICD-10-PCS | Performed by: INTERNAL MEDICINE

## 2024-01-31 PROCEDURE — C1894 INTRO/SHEATH, NON-LASER: HCPCS | Performed by: INTERNAL MEDICINE

## 2024-01-31 PROCEDURE — 2709999900 HC NON-CHARGEABLE SUPPLY: Performed by: INTERNAL MEDICINE

## 2024-01-31 PROCEDURE — 6370000000 HC RX 637 (ALT 250 FOR IP): Performed by: FAMILY MEDICINE

## 2024-01-31 PROCEDURE — 2500000003 HC RX 250 WO HCPCS

## 2024-01-31 PROCEDURE — 93650 ICAR CATH ABLTJ AV NODE FUNC: CPT | Performed by: INTERNAL MEDICINE

## 2024-01-31 PROCEDURE — 02H63JZ INSERTION OF PACEMAKER LEAD INTO RIGHT ATRIUM, PERCUTANEOUS APPROACH: ICD-10-PCS | Performed by: INTERNAL MEDICINE

## 2024-01-31 PROCEDURE — 33207 INSERT HEART PM VENTRICULAR: CPT | Performed by: INTERNAL MEDICINE

## 2024-01-31 PROCEDURE — C1898 LEAD, PMKR, OTHER THAN TRANS: HCPCS | Performed by: INTERNAL MEDICINE

## 2024-01-31 PROCEDURE — 02583ZZ DESTRUCTION OF CONDUCTION MECHANISM, PERCUTANEOUS APPROACH: ICD-10-PCS | Performed by: INTERNAL MEDICINE

## 2024-01-31 PROCEDURE — C2621 PMKR, OTHER THAN SING/DUAL: HCPCS | Performed by: INTERNAL MEDICINE

## 2024-01-31 PROCEDURE — 36415 COLL VENOUS BLD VENIPUNCTURE: CPT

## 2024-01-31 PROCEDURE — C1892 INTRO/SHEATH,FIXED,PEEL-AWAY: HCPCS | Performed by: INTERNAL MEDICINE

## 2024-01-31 PROCEDURE — 3700000001 HC ADD 15 MINUTES (ANESTHESIA): Performed by: INTERNAL MEDICINE

## 2024-01-31 PROCEDURE — C1732 CATH, EP, DIAG/ABL, 3D/VECT: HCPCS | Performed by: INTERNAL MEDICINE

## 2024-01-31 PROCEDURE — 2500000003 HC RX 250 WO HCPCS: Performed by: INTERNAL MEDICINE

## 2024-01-31 PROCEDURE — 71045 X-RAY EXAM CHEST 1 VIEW: CPT

## 2024-01-31 PROCEDURE — C8929 TTE W OR WO FOL WCON,DOPPLER: HCPCS

## 2024-01-31 PROCEDURE — 2720000010 HC SURG SUPPLY STERILE: Performed by: INTERNAL MEDICINE

## 2024-01-31 PROCEDURE — 85730 THROMBOPLASTIN TIME PARTIAL: CPT

## 2024-01-31 PROCEDURE — 85610 PROTHROMBIN TIME: CPT

## 2024-01-31 PROCEDURE — 99291 CRITICAL CARE FIRST HOUR: CPT | Performed by: INTERNAL MEDICINE

## 2024-01-31 PROCEDURE — 80069 RENAL FUNCTION PANEL: CPT

## 2024-01-31 PROCEDURE — 3700000000 HC ANESTHESIA ATTENDED CARE: Performed by: INTERNAL MEDICINE

## 2024-01-31 PROCEDURE — 93005 ELECTROCARDIOGRAM TRACING: CPT | Performed by: INTERNAL MEDICINE

## 2024-01-31 PROCEDURE — 2500000003 HC RX 250 WO HCPCS: Performed by: NURSE ANESTHETIST, CERTIFIED REGISTERED

## 2024-01-31 PROCEDURE — 93010 ELECTROCARDIOGRAM REPORT: CPT | Performed by: INTERNAL MEDICINE

## 2024-01-31 DEVICE — PACE/SENSE LEAD
Type: IMPLANTABLE DEVICE | Status: FUNCTIONAL
Brand: ACUITY™ X4 STRAIGHT

## 2024-01-31 DEVICE — CARDIAC RESYNCHRONIZATION THERAPY PACEMAKER
Type: IMPLANTABLE DEVICE | Status: FUNCTIONAL
Brand: VISIONIST™ X4 CRT-P

## 2024-01-31 DEVICE — PACE/SENSE LEAD
Type: IMPLANTABLE DEVICE | Status: FUNCTIONAL
Brand: INGEVITY™+

## 2024-01-31 RX ORDER — BUMETANIDE 0.25 MG/ML
1 INJECTION INTRAMUSCULAR; INTRAVENOUS ONCE
Status: COMPLETED | OUTPATIENT
Start: 2024-01-31 | End: 2024-01-31

## 2024-01-31 RX ORDER — SODIUM CHLORIDE 9 MG/ML
INJECTION, SOLUTION INTRAVENOUS PRN
Status: DISCONTINUED | OUTPATIENT
Start: 2024-01-31 | End: 2024-02-09 | Stop reason: HOSPADM

## 2024-01-31 RX ORDER — EPHEDRINE SULFATE/0.9% NACL/PF 50 MG/5 ML
SYRINGE (ML) INTRAVENOUS PRN
Status: DISCONTINUED | OUTPATIENT
Start: 2024-01-31 | End: 2024-01-31 | Stop reason: SDUPTHER

## 2024-01-31 RX ORDER — MIDAZOLAM HYDROCHLORIDE 1 MG/ML
INJECTION INTRAMUSCULAR; INTRAVENOUS PRN
Status: DISCONTINUED | OUTPATIENT
Start: 2024-01-31 | End: 2024-01-31 | Stop reason: SDUPTHER

## 2024-01-31 RX ORDER — LIDOCAINE HYDROCHLORIDE AND EPINEPHRINE 10; 10 MG/ML; UG/ML
INJECTION, SOLUTION INFILTRATION; PERINEURAL PRN
Status: DISCONTINUED | OUTPATIENT
Start: 2024-01-31 | End: 2024-01-31 | Stop reason: HOSPADM

## 2024-01-31 RX ORDER — SODIUM CHLORIDE 0.9 % (FLUSH) 0.9 %
5-40 SYRINGE (ML) INJECTION PRN
Status: DISCONTINUED | OUTPATIENT
Start: 2024-01-31 | End: 2024-02-09 | Stop reason: HOSPADM

## 2024-01-31 RX ORDER — SODIUM CHLORIDE 0.9 % (FLUSH) 0.9 %
5-40 SYRINGE (ML) INJECTION EVERY 12 HOURS SCHEDULED
Status: DISCONTINUED | OUTPATIENT
Start: 2024-01-31 | End: 2024-02-09 | Stop reason: HOSPADM

## 2024-01-31 RX ORDER — PHENYLEPHRINE HYDROCHLORIDE 10 MG/ML
INJECTION INTRAVENOUS PRN
Status: DISCONTINUED | OUTPATIENT
Start: 2024-01-31 | End: 2024-01-31 | Stop reason: SDUPTHER

## 2024-01-31 RX ORDER — ACETAMINOPHEN 325 MG/1
650 TABLET ORAL EVERY 4 HOURS PRN
Status: DISCONTINUED | OUTPATIENT
Start: 2024-01-31 | End: 2024-02-09 | Stop reason: HOSPADM

## 2024-01-31 RX ORDER — TRAZODONE HYDROCHLORIDE 50 MG/1
50 TABLET ORAL NIGHTLY PRN
Status: DISCONTINUED | OUTPATIENT
Start: 2024-01-31 | End: 2024-02-03

## 2024-01-31 RX ORDER — ONDANSETRON 2 MG/ML
4 INJECTION INTRAMUSCULAR; INTRAVENOUS EVERY 6 HOURS PRN
Status: DISCONTINUED | OUTPATIENT
Start: 2024-01-31 | End: 2024-02-09 | Stop reason: HOSPADM

## 2024-01-31 RX ORDER — POLYETHYLENE GLYCOL 3350 17 G/17G
17 POWDER, FOR SOLUTION ORAL DAILY PRN
Status: DISCONTINUED | OUTPATIENT
Start: 2024-01-31 | End: 2024-02-09 | Stop reason: HOSPADM

## 2024-01-31 RX ORDER — LIDOCAINE HYDROCHLORIDE 20 MG/ML
INJECTION, SOLUTION EPIDURAL; INFILTRATION; INTRACAUDAL; PERINEURAL PRN
Status: DISCONTINUED | OUTPATIENT
Start: 2024-01-31 | End: 2024-01-31 | Stop reason: SDUPTHER

## 2024-01-31 RX ORDER — MAGNESIUM HYDROXIDE/ALUMINUM HYDROXICE/SIMETHICONE 120; 1200; 1200 MG/30ML; MG/30ML; MG/30ML
15 SUSPENSION ORAL EVERY 6 HOURS PRN
Status: DISCONTINUED | OUTPATIENT
Start: 2024-01-31 | End: 2024-02-09 | Stop reason: HOSPADM

## 2024-01-31 RX ORDER — SODIUM CHLORIDE, SODIUM LACTATE, POTASSIUM CHLORIDE, CALCIUM CHLORIDE 600; 310; 30; 20 MG/100ML; MG/100ML; MG/100ML; MG/100ML
INJECTION, SOLUTION INTRAVENOUS CONTINUOUS PRN
Status: DISCONTINUED | OUTPATIENT
Start: 2024-01-31 | End: 2024-01-31 | Stop reason: SDUPTHER

## 2024-01-31 RX ORDER — FENTANYL CITRATE 50 UG/ML
INJECTION, SOLUTION INTRAMUSCULAR; INTRAVENOUS PRN
Status: DISCONTINUED | OUTPATIENT
Start: 2024-01-31 | End: 2024-01-31 | Stop reason: SDUPTHER

## 2024-01-31 RX ORDER — NOREPINEPHRINE BITARTRATE 0.02 MG/ML
1-100 INJECTION, SOLUTION INTRAVENOUS CONTINUOUS
Status: DISCONTINUED | OUTPATIENT
Start: 2024-01-31 | End: 2024-02-03

## 2024-01-31 RX ADMIN — SODIUM CHLORIDE, PRESERVATIVE FREE 10 ML: 5 INJECTION INTRAVENOUS at 08:43

## 2024-01-31 RX ADMIN — Medication: at 16:54

## 2024-01-31 RX ADMIN — PHENYLEPHRINE HYDROCHLORIDE 100 MCG: 10 INJECTION INTRAVENOUS at 13:47

## 2024-01-31 RX ADMIN — Medication 10 MG: at 15:18

## 2024-01-31 RX ADMIN — SODIUM CHLORIDE: 9 INJECTION, SOLUTION INTRAVENOUS at 16:53

## 2024-01-31 RX ADMIN — Medication 2 G: at 14:11

## 2024-01-31 RX ADMIN — PHENYLEPHRINE HYDROCHLORIDE 100 MCG: 10 INJECTION INTRAVENOUS at 14:51

## 2024-01-31 RX ADMIN — TRAZODONE HYDROCHLORIDE 50 MG: 50 TABLET ORAL at 23:35

## 2024-01-31 RX ADMIN — TAMSULOSIN HYDROCHLORIDE 0.4 MG: 0.4 CAPSULE ORAL at 08:42

## 2024-01-31 RX ADMIN — SODIUM CHLORIDE, PRESERVATIVE FREE 10 ML: 5 INJECTION INTRAVENOUS at 20:33

## 2024-01-31 RX ADMIN — LIDOCAINE HYDROCHLORIDE 20 MG: 20 INJECTION, SOLUTION EPIDURAL; INFILTRATION; INTRACAUDAL; PERINEURAL at 14:00

## 2024-01-31 RX ADMIN — MIDAZOLAM 1 MG: 1 INJECTION INTRAMUSCULAR; INTRAVENOUS at 13:41

## 2024-01-31 RX ADMIN — SODIUM CHLORIDE, PRESERVATIVE FREE 0.3 ML: 5 INJECTION INTRAVENOUS at 09:26

## 2024-01-31 RX ADMIN — PHENYLEPHRINE HYDROCHLORIDE 100 MCG: 10 INJECTION INTRAVENOUS at 14:54

## 2024-01-31 RX ADMIN — BUMETANIDE 1 MG: 0.25 INJECTION INTRAMUSCULAR; INTRAVENOUS at 18:22

## 2024-01-31 RX ADMIN — FENTANYL CITRATE 25 MCG: 50 INJECTION, SOLUTION INTRAMUSCULAR; INTRAVENOUS at 14:08

## 2024-01-31 RX ADMIN — ROSUVASTATIN CALCIUM 10 MG: 20 TABLET, FILM COATED ORAL at 08:42

## 2024-01-31 RX ADMIN — SODIUM CHLORIDE: 9 INJECTION, SOLUTION INTRAVENOUS at 07:36

## 2024-01-31 RX ADMIN — AMIODARONE HYDROCHLORIDE 1 MG/MIN: 50 INJECTION, SOLUTION INTRAVENOUS at 12:38

## 2024-01-31 RX ADMIN — LIDOCAINE HYDROCHLORIDE 20 MG: 20 INJECTION, SOLUTION EPIDURAL; INFILTRATION; INTRACAUDAL; PERINEURAL at 13:43

## 2024-01-31 RX ADMIN — SODIUM CHLORIDE, PRESERVATIVE FREE 10 ML: 5 INJECTION INTRAVENOUS at 20:34

## 2024-01-31 RX ADMIN — PHENYLEPHRINE HYDROCHLORIDE 100 MCG: 10 INJECTION INTRAVENOUS at 15:15

## 2024-01-31 RX ADMIN — INSULIN GLARGINE 25 UNITS: 100 INJECTION, SOLUTION SUBCUTANEOUS at 20:33

## 2024-01-31 RX ADMIN — INSULIN GLARGINE 25 UNITS: 100 INJECTION, SOLUTION SUBCUTANEOUS at 08:42

## 2024-01-31 RX ADMIN — FENTANYL CITRATE 25 MCG: 50 INJECTION, SOLUTION INTRAMUSCULAR; INTRAVENOUS at 15:12

## 2024-01-31 RX ADMIN — SODIUM CHLORIDE, SODIUM LACTATE, POTASSIUM CHLORIDE, AND CALCIUM CHLORIDE: 600; 310; 30; 20 INJECTION, SOLUTION INTRAVENOUS at 13:27

## 2024-01-31 RX ADMIN — ASPIRIN 81 MG 81 MG: 81 TABLET ORAL at 08:42

## 2024-01-31 RX ADMIN — FENTANYL CITRATE 25 MCG: 50 INJECTION, SOLUTION INTRAMUSCULAR; INTRAVENOUS at 13:41

## 2024-01-31 ASSESSMENT — PAIN SCALES - GENERAL
PAINLEVEL_OUTOF10: 0

## 2024-01-31 NOTE — WOUND CARE
Patient seen for reported dry flaky and cracked skin to both legs noted arms also in this condition. No ulcerations. Will add Aquaphor ointment to plan. She stated bottom has some redness, recommend adding zinc barrier cream twice daily. Will sign off.

## 2024-01-31 NOTE — ANESTHESIA POSTPROCEDURE EVALUATION
Department of Anesthesiology  Postprocedure Note    Patient: Kiran Galvez  MRN: [id number]  YOB: 1940  Date of evaluation: 1/31/2024    Procedure Summary       Date: 01/31/24 Room / Location: [facility] 2 ALL EVENTS / [facility] CARDIAC CATH LAB    Anesthesia Start: 1324 Anesthesia Stop: 1548    Procedures:       Insert PPM BiV      Lv lead placement      Ablation AV node Diagnosis:       Atrial fibrillation with rapid ventricular response (HCC)      (Atrial fibrillation with rapid ventricular response (HCC) [I48.91])    Providers: Ilan Montana MD Responsible Provider: Mary Ellen Potter MD    Anesthesia Type: MAC ASA Status: 4            Anesthesia Type: No value filed.    Matilda Phase I:      Matilda Phase II:      Anesthesia Post Evaluation    Patient location during evaluation: PACU  Patient participation: complete - patient participated  Level of consciousness: awake and alert  Airway patency: patent  Nausea & Vomiting: no nausea  Cardiovascular status: hemodynamically stable  Respiratory status: acceptable  Hydration status: euvolemic  Comments: Blood pressure (!) 89/64, pulse 90, temperature 97.2 °F (36.2 °C), temperature source Temporal, resp. rate 12, height 1.753 m (5' 9\"), weight 126.5 kg (278 lb 14.1 oz), SpO2 94 %.   Pain management: adequate and satisfactory to patient        There were no known notable events for this encounter.

## 2024-01-31 NOTE — INTERDISCIPLINARY ROUNDS
Multi-D Rounds/Checklist (leapfrog):  Lines: can any be removed?: None      DVT Prophylaxis: Ordered  Vent: N/A  Nutrition Ordered/appropriate: Ordered  Can antibiotics or other drugs be stopped? yes  Inpat Anti-Infectives (From admission, onward)       Start     Ordered Stop    02/01/24 2200  ceFAZolin (ANCEF) 2000 mg in sterile water 20 mL IV syringe  2,000 mg,   IntraVENous,   EVERY 8 HOURS         01/31/24 1504 02/02/24 1359                  Consults needed:  pulmonary  A: Is pain control adequate? (has PRNs? Stop drip?) N/A  B: Sedation break and SBT? Yes  C: Is sedation choice appropriate? N/A  D: Delirium/CAM-ICU? No  E: Mobility goals/appropriateness? N/A  F: Family update and plan? Wife is surrogate decision maker and is being updated daily by primary attending and nursing staff.    Carlene Servin, APRN - CNP

## 2024-02-01 ENCOUNTER — APPOINTMENT (OUTPATIENT)
Dept: GENERAL RADIOLOGY | Age: 84
DRG: 242 | End: 2024-02-01
Payer: MEDICARE

## 2024-02-01 PROBLEM — J90 PLEURAL EFFUSION ON LEFT: Status: ACTIVE | Noted: 2024-02-01

## 2024-02-01 PROBLEM — J90 PLEURAL EFFUSION ON RIGHT: Status: ACTIVE | Noted: 2024-02-01

## 2024-02-01 LAB
ANION GAP SERPL CALC-SCNC: 0 MMOL/L (ref 2–11)
BUN SERPL-MCNC: 57 MG/DL (ref 8–23)
CALCIUM SERPL-MCNC: 8.3 MG/DL (ref 8.3–10.4)
CHLORIDE SERPL-SCNC: 111 MMOL/L (ref 103–113)
CO2 SERPL-SCNC: 31 MMOL/L (ref 21–32)
CREAT SERPL-MCNC: 2.2 MG/DL (ref 0.8–1.5)
EKG ATRIAL RATE: 147 BPM
EKG DIAGNOSIS: NORMAL
EKG Q-T INTERVAL: 396 MS
EKG QRS DURATION: 134 MS
EKG QTC CALCULATION (BAZETT): 484 MS
EKG R AXIS: -84 DEGREES
EKG T AXIS: 62 DEGREES
EKG VENTRICULAR RATE: 90 BPM
ERYTHROCYTE [DISTWIDTH] IN BLOOD BY AUTOMATED COUNT: 15.6 % (ref 11.9–14.6)
GLUCOSE BLD STRIP.AUTO-MCNC: 121 MG/DL (ref 65–100)
GLUCOSE BLD STRIP.AUTO-MCNC: 59 MG/DL (ref 65–100)
GLUCOSE BLD STRIP.AUTO-MCNC: 79 MG/DL (ref 65–100)
GLUCOSE BLD STRIP.AUTO-MCNC: 84 MG/DL (ref 65–100)
GLUCOSE BLD STRIP.AUTO-MCNC: 86 MG/DL (ref 65–100)
GLUCOSE BLD STRIP.AUTO-MCNC: 88 MG/DL (ref 65–100)
GLUCOSE SERPL-MCNC: 63 MG/DL (ref 65–100)
HCT VFR BLD AUTO: 39.9 % (ref 41.1–50.3)
HGB BLD-MCNC: 11.9 G/DL (ref 13.6–17.2)
MCH RBC QN AUTO: 28.7 PG (ref 26.1–32.9)
MCHC RBC AUTO-ENTMCNC: 29.8 G/DL (ref 31.4–35)
MCV RBC AUTO: 96.4 FL (ref 82–102)
MM INDURATION, POC: 0 MM (ref 0–5)
NRBC # BLD: 0 K/UL (ref 0–0.2)
PLATELET # BLD AUTO: 151 K/UL (ref 150–450)
PMV BLD AUTO: 9.3 FL (ref 9.4–12.3)
POTASSIUM SERPL-SCNC: 4.6 MMOL/L (ref 3.5–5.1)
PPD, POC: NEGATIVE
RBC # BLD AUTO: 4.14 M/UL (ref 4.23–5.6)
SERVICE CMNT-IMP: ABNORMAL
SERVICE CMNT-IMP: ABNORMAL
SERVICE CMNT-IMP: NORMAL
SODIUM SERPL-SCNC: 142 MMOL/L (ref 136–146)
WBC # BLD AUTO: 10.2 K/UL (ref 4.3–11.1)

## 2024-02-01 PROCEDURE — 6370000000 HC RX 637 (ALT 250 FOR IP): Performed by: INTERNAL MEDICINE

## 2024-02-01 PROCEDURE — 0W993ZZ DRAINAGE OF RIGHT PLEURAL CAVITY, PERCUTANEOUS APPROACH: ICD-10-PCS | Performed by: INTERNAL MEDICINE

## 2024-02-01 PROCEDURE — 6360000002 HC RX W HCPCS: Performed by: INTERNAL MEDICINE

## 2024-02-01 PROCEDURE — 36415 COLL VENOUS BLD VENIPUNCTURE: CPT

## 2024-02-01 PROCEDURE — 71045 X-RAY EXAM CHEST 1 VIEW: CPT

## 2024-02-01 PROCEDURE — 97530 THERAPEUTIC ACTIVITIES: CPT

## 2024-02-01 PROCEDURE — 2700000000 HC OXYGEN THERAPY PER DAY

## 2024-02-01 PROCEDURE — 97535 SELF CARE MNGMENT TRAINING: CPT

## 2024-02-01 PROCEDURE — 2100000001 HC CVICU R&B

## 2024-02-01 PROCEDURE — 97112 NEUROMUSCULAR REEDUCATION: CPT

## 2024-02-01 PROCEDURE — 80048 BASIC METABOLIC PNL TOTAL CA: CPT

## 2024-02-01 PROCEDURE — 0W9B3ZZ DRAINAGE OF LEFT PLEURAL CAVITY, PERCUTANEOUS APPROACH: ICD-10-PCS | Performed by: INTERNAL MEDICINE

## 2024-02-01 PROCEDURE — 2580000003 HC RX 258: Performed by: INTERNAL MEDICINE

## 2024-02-01 PROCEDURE — 99223 1ST HOSP IP/OBS HIGH 75: CPT | Performed by: INTERNAL MEDICINE

## 2024-02-01 PROCEDURE — 93010 ELECTROCARDIOGRAM REPORT: CPT | Performed by: INTERNAL MEDICINE

## 2024-02-01 PROCEDURE — 32555 ASPIRATE PLEURA W/ IMAGING: CPT | Performed by: INTERNAL MEDICINE

## 2024-02-01 PROCEDURE — 82962 GLUCOSE BLOOD TEST: CPT

## 2024-02-01 PROCEDURE — 6370000000 HC RX 637 (ALT 250 FOR IP): Performed by: FAMILY MEDICINE

## 2024-02-01 PROCEDURE — 85027 COMPLETE CBC AUTOMATED: CPT

## 2024-02-01 RX ORDER — CARVEDILOL 3.12 MG/1
3.12 TABLET ORAL 2 TIMES DAILY WITH MEALS
Status: DISCONTINUED | OUTPATIENT
Start: 2024-02-01 | End: 2024-02-09 | Stop reason: HOSPADM

## 2024-02-01 RX ADMIN — INSULIN GLARGINE 25 UNITS: 100 INJECTION, SOLUTION SUBCUTANEOUS at 21:16

## 2024-02-01 RX ADMIN — TRAZODONE HYDROCHLORIDE 50 MG: 50 TABLET ORAL at 22:21

## 2024-02-01 RX ADMIN — SODIUM CHLORIDE, PRESERVATIVE FREE 10 ML: 5 INJECTION INTRAVENOUS at 20:15

## 2024-02-01 RX ADMIN — Medication: at 10:07

## 2024-02-01 RX ADMIN — ASPIRIN 81 MG 81 MG: 81 TABLET ORAL at 10:05

## 2024-02-01 RX ADMIN — ROSUVASTATIN CALCIUM 10 MG: 20 TABLET, FILM COATED ORAL at 10:06

## 2024-02-01 RX ADMIN — TAMSULOSIN HYDROCHLORIDE 0.4 MG: 0.4 CAPSULE ORAL at 10:06

## 2024-02-01 RX ADMIN — CEFAZOLIN 2000 MG: 10 INJECTION, POWDER, FOR SOLUTION INTRAVENOUS at 22:56

## 2024-02-01 ASSESSMENT — PAIN SCALES - GENERAL
PAINLEVEL_OUTOF10: 0

## 2024-02-01 NOTE — DISCHARGE INSTRUCTIONS
Device Clinic Follow-up on 02/14/2024 at 11AM in the [facility] Cardiology Office

## 2024-02-01 NOTE — CONSULTS
PULMONARY/CRITICAL CARE CONSULT NOTE           2/1/2024    Kiran Galvez                        Date of Admission:  1/29/2024    The patient's chart is reviewed and the patient is discussed with the staff.    Subjective:     Kiran Galvez is a 83 y.o.  male seen and evaluated at the request of Dr. Dorantes for right pleural effusion.  He has a PMH of CKD, PAD, pancreatic mass, obesity, bladder cancer, atrial fibrillation, SFA stenosis, CKD who was admitted with AF with RVR.  His echo revealed an EF of 15-20% and CXR reveals R effusion.  He underwent AV node ablation and BiV PM implantation yesterday by Dr. Montana.    Last night his BP was 70/40 and he required IV fluids- his fluid balance is roughly 6L positive this admission.      Review of Systems: Unable to obtain due to patient factors.     Current Outpatient Medications   Medication Instructions    alfuzosin (UROXATRAL) 10 mg, Oral, DAILY    aspirin (ASPIRIN CHILDRENS) 81 mg, Oral, DAILY    carvedilol (COREG) 25 mg    COMFORT EZ PEN NEEDLES 32G X 4 MM MISC No dose, route, or frequency recorded.    Cyanocobalamin 2500 MCG TABS Oral, DAILY    dilTIAZem (TIAZAC) 360 mg, Oral, DAILY    insulin detemir (LEVEMIR FLEXPEN) 100 UNIT/ML injection pen INJECT 40 UNITS UNDER THE SKIN IN THE MORNING AND 35 UNITS UNDER THE SKIN IN THE EVENING    Levemir FlexTouch 3,000 Units, SubCUTAneous, 2 TIMES DAILY    linaCLOtide (LINZESS) 72 mcg, Oral, DAILY BEFORE BREAKFAST    lisinopril-hydroCHLOROthiazide (PRINZIDE;ZESTORETIC) 20-12.5 MG per tablet 1 tablet, Oral, DAILY    NOVOLOG FLEXPEN 100 UNIT/ML injection pen No dose, route, or frequency recorded.    nystatin (MYCOSTATIN) 366796 UNIT/GM powder Apply 3 times daily.    ONETOUCH VERIO strip No dose, route, or frequency recorded.    rosuvastatin (CRESTOR) 10 mg, Oral, DAILY    Xarelto 15 mg, Oral, DAILY WITH BREAKFAST      Past Medical History:   Diagnosis Date    Atrial fibrillation, chronic (HCC)     Cancer 
    Patient discussed with attending cardiologist. Please see their addendum/attestation for further details.    Active Problems:    History of bladder cancer    JUANCARLOS (acute kidney injury) ([facility])    Class 3 severe obesity with body mass index (BMI) of 40.0 to 44.9 in adult (HCC)    PAD (peripheral artery disease) ([facility])    Hyperkalemia    Atrial fibrillation with RVR (HCC)  Permanent atrial fibrillation ([facility])  - h/o permanent afib on Xarelto for stroke prophylaxis & Coreg bid for rate control. Asymptomatic at baseline & currently.   - recent cardiac event monitor showed atrial fibrillation throughout with an average heart  rate on limited time of monitoring of 143 with a minimum heart rate of 116  and the maximum heart rate of 163.  No symptoms were reported.  - Xarelto held on admission & heparin drip started pending vascular consult & procedures  - S/p dilt bolus & drip currently maxed out & resumed home BB with afib RVR still on tele  - echo pending- LV fxn nl on 2019 [facility] imaging  - patient amenable to pace ablate strategy - please see Dr. 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 *     Thank you very much for this referral. We appreciate the opportunity to participate in this patient's care. We will follow along with above stated plan.    Karolina Salgado PA-C  1/30/2024 11:04 AM  Consulting Physician: Dr. Dorantes    Attending Addendum    Patient independently seen and examined by me.  Agree with above note by physician extender with the following additions and exceptions: 83 y.o. morbidly obese male with a past medical history of bladder cancer, mass on head of pancreas, uncontrolled DM2 w/ CKD3b, PAD, normal stress nuc med imaging in 2019 (see below) & permanent Afib on Xarelto & Coreg bid admitted with leg pain and afib with RVR    Key

## 2024-02-01 NOTE — PROCEDURES
THORACENTESIS   2/1/2024    Indication/Preprocedure diagnosis: RIGHT and LEFT Pleural effusion  Volume of fluid withdrawn:  Right:  900ml  Left:  800ml  Postprocedural Diagnosis:  Pleural effusion    After obtaining informed consent the patient was placed sitting up on the side of the bed and using ultrasound guidance the pleural fluid was located on the right side  and marked.  The diaphragm and atelectatic lung were clearly visualized.  The patient's skin was cleaned with ChloraPrep.  Using sterile technique the skin was anesthetized with 10mL of 1% Xylocaine and a stab wound made and a catheter inserted into the pleural space with 900 cc of trev-appearing fluid was removed.    Then, using ultrasound guidance the pleural fluid was located on the left side  and marked.  The diaphragm and atelectatic lung were clearly visualized.  The patient's skin was cleaned with ChloraPrep.  Using sterile technique the skin was anesthetized with 10mL of 1% Xylocaine and a stab wound made and a catheter inserted into the pleural space with 800 cc of trev-appearing fluid was removed.    The patient tolerated the procedure well. No fluid was sent for analysis because clinically consistent with HFrEF and CKD.    Karolina Covarrubias MD     
and dilator. The dilator was removed and the braided core was advanced into the RA and then RV. The CS was cannulated using the Roni system. A non-occlusive coronary sinus venogram was then performed demonstrating a lateral target. The left ventricular lead was then advanced with into the lateral branch over a Whisper wire. Adequate thresholds were obtained with an adequate margin between phrenic stim and loss of capture. The delivery sheaths were then slit or peeled with fluoroscopy demonstrating stable position. The lead was then sutured to the underlying pectoralis fascia using 0-Ethibond sutures around the lead collar. The leads were tested via the pacing system analyzer (PSA) demonstrating stable sensing, impedance and pacing thresholds. The lead pins were then cleaned with antibiotic soaked gauze, dried gently, and attached to a new biventricular pacemaker generator. Pins were directly observed to pass the tip electrode, and the ring hex wrench screws were secured, and leads tug tested. The device and leads were gently positioned within the pocket after the pocket was irrigated copiously with a saline antimicrobial solution. A retention suture was placed and Florence powder was injected to promote hemostasis. The wound was closed with multiple layers of 3-0 Stratafix suture followed by skin closure with 4-0 V-Loc. Dermabond solution was placed over the wound, allowed to dry, and then a sterile dressing was placed over the wound.  Fluoroscopic images were interpreted by me, in multiple projections. Final device position was confirmed by stored fluoroscopic image from device pocket to lead tips in AP projection.      AV node ablation: After informed consent was obtained, the patient was brought to the Electrophysiology Laboratory in a fasting state and was prepped and draped in sterile fashion. The pacemaker was reprogrammed to VVI 40 bpm. Local anesthetic was delivered to the region over the right femoral vein.

## 2024-02-01 NOTE — PLAN OF CARE
Problem: Discharge Planning  Goal: Discharge to home or other facility with appropriate resources  Outcome: Progressing  Flowsheets (Taken 1/31/2024 1915 by Sean Roa, RN)  Discharge to home or other facility with appropriate resources:   Identify barriers to discharge with patient and caregiver   Arrange for needed discharge resources and transportation as appropriate   Identify discharge learning needs (meds, wound care, etc)   Arrange for interpreters to assist at discharge as needed   Refer to discharge planning if patient needs post-hospital services based on physician order or complex needs related to functional status, cognitive ability or social support system     Problem: Safety - Adult  Goal: Free from fall injury  Outcome: Progressing  Flowsheets (Taken 2/1/2024 0730)  Free From Fall Injury: Instruct family/caregiver on patient safety

## 2024-02-02 ENCOUNTER — APPOINTMENT (OUTPATIENT)
Dept: GENERAL RADIOLOGY | Age: 84
DRG: 242 | End: 2024-02-02
Payer: MEDICARE

## 2024-02-02 PROBLEM — Z87.891 HISTORY OF SMOKING: Status: ACTIVE | Noted: 2024-02-02

## 2024-02-02 PROBLEM — J69.0 ASPIRATION PNEUMONIA (HCC): Status: ACTIVE | Noted: 2024-02-02

## 2024-02-02 LAB
ANION GAP SERPL CALC-SCNC: 3 MMOL/L (ref 2–11)
BASOPHILS # BLD: 0 K/UL (ref 0–0.2)
BASOPHILS NFR BLD: 0 % (ref 0–2)
BUN SERPL-MCNC: 51 MG/DL (ref 8–23)
CALCIUM SERPL-MCNC: 8.2 MG/DL (ref 8.3–10.4)
CHLORIDE SERPL-SCNC: 109 MMOL/L (ref 103–113)
CO2 SERPL-SCNC: 29 MMOL/L (ref 21–32)
CREAT SERPL-MCNC: 2.1 MG/DL (ref 0.8–1.5)
DIFFERENTIAL METHOD BLD: ABNORMAL
EOSINOPHIL # BLD: 0.3 K/UL (ref 0–0.8)
EOSINOPHIL NFR BLD: 3 % (ref 0.5–7.8)
ERYTHROCYTE [DISTWIDTH] IN BLOOD BY AUTOMATED COUNT: 15.4 % (ref 11.9–14.6)
GLUCOSE BLD STRIP.AUTO-MCNC: 122 MG/DL (ref 65–100)
GLUCOSE BLD STRIP.AUTO-MCNC: 140 MG/DL (ref 65–100)
GLUCOSE BLD STRIP.AUTO-MCNC: 179 MG/DL (ref 65–100)
GLUCOSE BLD STRIP.AUTO-MCNC: 48 MG/DL (ref 65–100)
GLUCOSE BLD STRIP.AUTO-MCNC: 49 MG/DL (ref 65–100)
GLUCOSE BLD STRIP.AUTO-MCNC: 52 MG/DL (ref 65–100)
GLUCOSE BLD STRIP.AUTO-MCNC: 87 MG/DL (ref 65–100)
GLUCOSE SERPL-MCNC: 71 MG/DL (ref 65–100)
HCT VFR BLD AUTO: 35.8 % (ref 41.1–50.3)
HGB BLD-MCNC: 11.2 G/DL (ref 13.6–17.2)
IMM GRANULOCYTES # BLD AUTO: 0 K/UL (ref 0–0.5)
IMM GRANULOCYTES NFR BLD AUTO: 1 % (ref 0–5)
LYMPHOCYTES # BLD: 2.3 K/UL (ref 0.5–4.6)
LYMPHOCYTES NFR BLD: 26 % (ref 13–44)
MCH RBC QN AUTO: 29.2 PG (ref 26.1–32.9)
MCHC RBC AUTO-ENTMCNC: 31.3 G/DL (ref 31.4–35)
MCV RBC AUTO: 93.5 FL (ref 82–102)
MM INDURATION, POC: 0 MM (ref 0–5)
MONOCYTES # BLD: 0.9 K/UL (ref 0.1–1.3)
MONOCYTES NFR BLD: 10 % (ref 4–12)
NEUTS SEG # BLD: 5.2 K/UL (ref 1.7–8.2)
NEUTS SEG NFR BLD: 60 % (ref 43–78)
NRBC # BLD: 0 K/UL (ref 0–0.2)
PLATELET # BLD AUTO: 134 K/UL (ref 150–450)
PMV BLD AUTO: 9.6 FL (ref 9.4–12.3)
POTASSIUM SERPL-SCNC: 4.4 MMOL/L (ref 3.5–5.1)
PPD, POC: NEGATIVE
RBC # BLD AUTO: 3.83 M/UL (ref 4.23–5.6)
SERVICE CMNT-IMP: ABNORMAL
SERVICE CMNT-IMP: NORMAL
SODIUM SERPL-SCNC: 141 MMOL/L (ref 136–146)
WBC # BLD AUTO: 8.7 K/UL (ref 4.3–11.1)

## 2024-02-02 PROCEDURE — 92610 EVALUATE SWALLOWING FUNCTION: CPT

## 2024-02-02 PROCEDURE — 6360000002 HC RX W HCPCS: Performed by: INTERNAL MEDICINE

## 2024-02-02 PROCEDURE — 36415 COLL VENOUS BLD VENIPUNCTURE: CPT

## 2024-02-02 PROCEDURE — 2140000001 HC CVICU INTERMEDIATE R&B

## 2024-02-02 PROCEDURE — 71045 X-RAY EXAM CHEST 1 VIEW: CPT

## 2024-02-02 PROCEDURE — 6370000000 HC RX 637 (ALT 250 FOR IP): Performed by: INTERNAL MEDICINE

## 2024-02-02 PROCEDURE — 97112 NEUROMUSCULAR REEDUCATION: CPT

## 2024-02-02 PROCEDURE — 94760 N-INVAS EAR/PLS OXIMETRY 1: CPT

## 2024-02-02 PROCEDURE — 94640 AIRWAY INHALATION TREATMENT: CPT

## 2024-02-02 PROCEDURE — 99233 SBSQ HOSP IP/OBS HIGH 50: CPT | Performed by: INTERNAL MEDICINE

## 2024-02-02 PROCEDURE — 2580000003 HC RX 258: Performed by: HOSPITALIST

## 2024-02-02 PROCEDURE — 97530 THERAPEUTIC ACTIVITIES: CPT

## 2024-02-02 PROCEDURE — 2580000003 HC RX 258: Performed by: INTERNAL MEDICINE

## 2024-02-02 PROCEDURE — 74018 RADEX ABDOMEN 1 VIEW: CPT

## 2024-02-02 PROCEDURE — 85025 COMPLETE CBC W/AUTO DIFF WBC: CPT

## 2024-02-02 PROCEDURE — 80048 BASIC METABOLIC PNL TOTAL CA: CPT

## 2024-02-02 PROCEDURE — 2700000000 HC OXYGEN THERAPY PER DAY

## 2024-02-02 PROCEDURE — 6370000000 HC RX 637 (ALT 250 FOR IP): Performed by: HOSPITALIST

## 2024-02-02 PROCEDURE — 94761 N-INVAS EAR/PLS OXIMETRY MLT: CPT

## 2024-02-02 PROCEDURE — 82962 GLUCOSE BLOOD TEST: CPT

## 2024-02-02 RX ORDER — SENNA AND DOCUSATE SODIUM 50; 8.6 MG/1; MG/1
2 TABLET, FILM COATED ORAL DAILY PRN
Status: DISCONTINUED | OUTPATIENT
Start: 2024-02-02 | End: 2024-02-09 | Stop reason: HOSPADM

## 2024-02-02 RX ORDER — LEVOFLOXACIN 5 MG/ML
750 INJECTION, SOLUTION INTRAVENOUS
Status: DISCONTINUED | OUTPATIENT
Start: 2024-02-02 | End: 2024-02-03

## 2024-02-02 RX ORDER — DEXTROSE MONOHYDRATE 100 MG/ML
INJECTION, SOLUTION INTRAVENOUS CONTINUOUS PRN
Status: DISCONTINUED | OUTPATIENT
Start: 2024-02-02 | End: 2024-02-09 | Stop reason: HOSPADM

## 2024-02-02 RX ORDER — IPRATROPIUM BROMIDE AND ALBUTEROL SULFATE 2.5; .5 MG/3ML; MG/3ML
1 SOLUTION RESPIRATORY (INHALATION)
Status: DISCONTINUED | OUTPATIENT
Start: 2024-02-02 | End: 2024-02-09 | Stop reason: HOSPADM

## 2024-02-02 RX ORDER — BUDESONIDE 0.5 MG/2ML
0.5 INHALANT ORAL
Status: DISCONTINUED | OUTPATIENT
Start: 2024-02-02 | End: 2024-02-09 | Stop reason: HOSPADM

## 2024-02-02 RX ORDER — IBUPROFEN 600 MG/1
1 TABLET ORAL PRN
Status: DISCONTINUED | OUTPATIENT
Start: 2024-02-02 | End: 2024-02-09 | Stop reason: HOSPADM

## 2024-02-02 RX ORDER — DEXTROSE AND SODIUM CHLORIDE 5; .45 G/100ML; G/100ML
INJECTION, SOLUTION INTRAVENOUS CONTINUOUS
Status: ACTIVE | OUTPATIENT
Start: 2024-02-02 | End: 2024-02-02

## 2024-02-02 RX ADMIN — DOCUSATE SODIUM 50 MG AND SENNOSIDES 8.6 MG 2 TABLET: 8.6; 5 TABLET, FILM COATED ORAL at 11:54

## 2024-02-02 RX ADMIN — DEXTROSE MONOHYDRATE 125 ML: 100 INJECTION, SOLUTION INTRAVENOUS at 08:33

## 2024-02-02 RX ADMIN — IPRATROPIUM BROMIDE AND ALBUTEROL SULFATE 1 DOSE: 2.5; .5 SOLUTION RESPIRATORY (INHALATION) at 15:23

## 2024-02-02 RX ADMIN — SODIUM CHLORIDE, PRESERVATIVE FREE 10 ML: 5 INJECTION INTRAVENOUS at 20:19

## 2024-02-02 RX ADMIN — SODIUM CHLORIDE, PRESERVATIVE FREE 10 ML: 5 INJECTION INTRAVENOUS at 09:55

## 2024-02-02 RX ADMIN — BUDESONIDE INHALATION 500 MCG: 0.5 SUSPENSION RESPIRATORY (INHALATION) at 21:39

## 2024-02-02 RX ADMIN — Medication 16 G: at 06:40

## 2024-02-02 RX ADMIN — ASPIRIN 81 MG 81 MG: 81 TABLET ORAL at 11:35

## 2024-02-02 RX ADMIN — IPRATROPIUM BROMIDE AND ALBUTEROL SULFATE 1 DOSE: 2.5; .5 SOLUTION RESPIRATORY (INHALATION) at 21:39

## 2024-02-02 RX ADMIN — DEXTROSE MONOHYDRATE 125 ML: 100 INJECTION, SOLUTION INTRAVENOUS at 07:07

## 2024-02-02 RX ADMIN — TAMSULOSIN HYDROCHLORIDE 0.4 MG: 0.4 CAPSULE ORAL at 11:35

## 2024-02-02 RX ADMIN — CEFAZOLIN 2000 MG: 10 INJECTION, POWDER, FOR SOLUTION INTRAVENOUS at 06:07

## 2024-02-02 RX ADMIN — RIVAROXABAN 15 MG: 15 TABLET, FILM COATED ORAL at 17:22

## 2024-02-02 RX ADMIN — DEXTROSE AND SODIUM CHLORIDE: 5; 450 INJECTION, SOLUTION INTRAVENOUS at 08:39

## 2024-02-02 RX ADMIN — LEVOFLOXACIN 750 MG: 5 INJECTION, SOLUTION INTRAVENOUS at 09:51

## 2024-02-02 RX ADMIN — POLYETHYLENE GLYCOL 3350 17 G: 17 POWDER, FOR SOLUTION ORAL at 11:35

## 2024-02-02 RX ADMIN — SODIUM CHLORIDE, PRESERVATIVE FREE 10 ML: 5 INJECTION INTRAVENOUS at 09:56

## 2024-02-02 RX ADMIN — ROSUVASTATIN CALCIUM 10 MG: 20 TABLET, FILM COATED ORAL at 11:35

## 2024-02-02 ASSESSMENT — PAIN SCALES - GENERAL
PAINLEVEL_OUTOF10: 0
PAINLEVEL_OUTOF10: 0

## 2024-02-02 NOTE — INTERDISCIPLINARY ROUNDS
Multi-D Rounds/Checklist (leapfrog):  Lines: can any be removed?: None    Urinary Catheter 01/31/24 Hdez (Active)      DVT Prophylaxis: Ordered  Vent: N/A  Nutrition Ordered/appropriate: Ordered  Can antibiotics or other drugs be stopped? N/A Yes/No  Inpat Anti-Infectives (From admission, onward)      None          Consults needed: None  A: Is pain control adequate? (has PRNs? Stop drip?) Yes  B: Sedation break and SBT? N/A  C: Is sedation choice appropriate? N/A  D: Delirium/CAM-ICU? No  E: Mobility goals/appropriateness? Yes  F: Family update and plan? wife is surrogate decision maker and is being updated daily by primary attending and nursing staff.    Nelia Ventura, APRN - CNP

## 2024-02-02 NOTE — CARE COORDINATION
CM spoke to patient at bedside and informed him PT is recommending STR for discharge plan. Patient stated that he is in agreement with STR but would want a facility in [place] which is not far from his house so his spouse may be able to visit him in rehab.     CM gave patient STR choice list to review. CM sent referral to all facilities listed in [place] as requested by patient ([facility], [place] [facility], and [facility]). Patient stated he has no particular preference in choices.     CM to follow up.

## 2024-02-03 LAB
ANION GAP SERPL CALC-SCNC: 2 MMOL/L (ref 2–11)
APPEARANCE UR: ABNORMAL
BACTERIA URNS QL MICRO: 0 /HPF
BILIRUB UR QL: NEGATIVE
BUN SERPL-MCNC: 46 MG/DL (ref 8–23)
CALCIUM SERPL-MCNC: 8.2 MG/DL (ref 8.3–10.4)
CASTS URNS QL MICRO: 0 /LPF
CHLORIDE SERPL-SCNC: 108 MMOL/L (ref 103–113)
CO2 SERPL-SCNC: 30 MMOL/L (ref 21–32)
COLOR UR: ABNORMAL
CREAT SERPL-MCNC: 1.8 MG/DL (ref 0.8–1.5)
CRYSTALS URNS QL MICRO: 0 /LPF
EPI CELLS #/AREA URNS HPF: 0 /HPF
ERYTHROCYTE [DISTWIDTH] IN BLOOD BY AUTOMATED COUNT: 15.5 % (ref 11.9–14.6)
GLUCOSE BLD STRIP.AUTO-MCNC: 124 MG/DL (ref 65–100)
GLUCOSE BLD STRIP.AUTO-MCNC: 158 MG/DL (ref 65–100)
GLUCOSE BLD STRIP.AUTO-MCNC: 205 MG/DL (ref 65–100)
GLUCOSE BLD STRIP.AUTO-MCNC: 231 MG/DL (ref 65–100)
GLUCOSE SERPL-MCNC: 151 MG/DL (ref 65–100)
GLUCOSE UR STRIP.AUTO-MCNC: NEGATIVE MG/DL
HCT VFR BLD AUTO: 35.9 % (ref 41.1–50.3)
HGB BLD-MCNC: 11 G/DL (ref 13.6–17.2)
HGB UR QL STRIP: ABNORMAL
KETONES UR QL STRIP.AUTO: NEGATIVE MG/DL
LEUKOCYTE ESTERASE UR QL STRIP.AUTO: ABNORMAL
MCH RBC QN AUTO: 28.8 PG (ref 26.1–32.9)
MCHC RBC AUTO-ENTMCNC: 30.6 G/DL (ref 31.4–35)
MCV RBC AUTO: 94 FL (ref 82–102)
MUCOUS THREADS URNS QL MICRO: 0 /LPF
NITRITE UR QL STRIP.AUTO: NEGATIVE
NRBC # BLD: 0 K/UL (ref 0–0.2)
OTHER OBSERVATIONS: ABNORMAL
PH UR STRIP: 6 (ref 5–9)
PLATELET # BLD AUTO: 123 K/UL (ref 150–450)
PMV BLD AUTO: 9.6 FL (ref 9.4–12.3)
POTASSIUM SERPL-SCNC: 4.7 MMOL/L (ref 3.5–5.1)
PROT UR STRIP-MCNC: 30 MG/DL
RBC # BLD AUTO: 3.82 M/UL (ref 4.23–5.6)
RBC #/AREA URNS HPF: >100 /HPF
SERVICE CMNT-IMP: ABNORMAL
SODIUM SERPL-SCNC: 140 MMOL/L (ref 136–146)
SP GR UR REFRACTOMETRY: 1.01 (ref 1–1.02)
URINE CULTURE IF INDICATED: ABNORMAL
UROBILINOGEN UR QL STRIP.AUTO: 0.2 EU/DL (ref 0.2–1)
WBC # BLD AUTO: 8.2 K/UL (ref 4.3–11.1)
WBC URNS QL MICRO: 0 /HPF

## 2024-02-03 PROCEDURE — 6370000000 HC RX 637 (ALT 250 FOR IP): Performed by: INTERNAL MEDICINE

## 2024-02-03 PROCEDURE — 99232 SBSQ HOSP IP/OBS MODERATE 35: CPT | Performed by: INTERNAL MEDICINE

## 2024-02-03 PROCEDURE — 85027 COMPLETE CBC AUTOMATED: CPT

## 2024-02-03 PROCEDURE — 6370000000 HC RX 637 (ALT 250 FOR IP): Performed by: HOSPITALIST

## 2024-02-03 PROCEDURE — 6360000002 HC RX W HCPCS: Performed by: INTERNAL MEDICINE

## 2024-02-03 PROCEDURE — 80048 BASIC METABOLIC PNL TOTAL CA: CPT

## 2024-02-03 PROCEDURE — 2580000003 HC RX 258: Performed by: INTERNAL MEDICINE

## 2024-02-03 PROCEDURE — 82962 GLUCOSE BLOOD TEST: CPT

## 2024-02-03 PROCEDURE — 2140000001 HC CVICU INTERMEDIATE R&B

## 2024-02-03 PROCEDURE — 94760 N-INVAS EAR/PLS OXIMETRY 1: CPT

## 2024-02-03 PROCEDURE — 94640 AIRWAY INHALATION TREATMENT: CPT

## 2024-02-03 PROCEDURE — 36415 COLL VENOUS BLD VENIPUNCTURE: CPT

## 2024-02-03 PROCEDURE — 2500000003 HC RX 250 WO HCPCS: Performed by: HOSPITALIST

## 2024-02-03 PROCEDURE — 81001 URINALYSIS AUTO W/SCOPE: CPT

## 2024-02-03 PROCEDURE — 2700000000 HC OXYGEN THERAPY PER DAY

## 2024-02-03 RX ORDER — TRAZODONE HYDROCHLORIDE 50 MG/1
50 TABLET ORAL NIGHTLY
Status: DISCONTINUED | OUTPATIENT
Start: 2024-02-03 | End: 2024-02-09 | Stop reason: HOSPADM

## 2024-02-03 RX ORDER — TRAZODONE HYDROCHLORIDE 50 MG/1
50 TABLET ORAL NIGHTLY
Status: DISCONTINUED | OUTPATIENT
Start: 2024-02-03 | End: 2024-02-03

## 2024-02-03 RX ORDER — BUMETANIDE 0.25 MG/ML
0.5 INJECTION INTRAMUSCULAR; INTRAVENOUS 2 TIMES DAILY
Status: DISCONTINUED | OUTPATIENT
Start: 2024-02-03 | End: 2024-02-06

## 2024-02-03 RX ADMIN — ASPIRIN 81 MG 81 MG: 81 TABLET ORAL at 08:49

## 2024-02-03 RX ADMIN — IPRATROPIUM BROMIDE AND ALBUTEROL SULFATE 1 DOSE: 2.5; .5 SOLUTION RESPIRATORY (INHALATION) at 19:16

## 2024-02-03 RX ADMIN — SODIUM CHLORIDE, PRESERVATIVE FREE 10 ML: 5 INJECTION INTRAVENOUS at 22:59

## 2024-02-03 RX ADMIN — ANTI-FUNGAL POWDER MICONAZOLE NITRATE TALC FREE: 1.42 POWDER TOPICAL at 08:52

## 2024-02-03 RX ADMIN — TRAZODONE HYDROCHLORIDE 50 MG: 50 TABLET ORAL at 22:29

## 2024-02-03 RX ADMIN — Medication: at 08:57

## 2024-02-03 RX ADMIN — SODIUM CHLORIDE, PRESERVATIVE FREE 10 ML: 5 INJECTION INTRAVENOUS at 08:59

## 2024-02-03 RX ADMIN — IPRATROPIUM BROMIDE AND ALBUTEROL SULFATE 1 DOSE: 2.5; .5 SOLUTION RESPIRATORY (INHALATION) at 07:58

## 2024-02-03 RX ADMIN — RIVAROXABAN 15 MG: 15 TABLET, FILM COATED ORAL at 08:49

## 2024-02-03 RX ADMIN — ROSUVASTATIN CALCIUM 10 MG: 20 TABLET, FILM COATED ORAL at 08:49

## 2024-02-03 RX ADMIN — BUDESONIDE INHALATION 500 MCG: 0.5 SUSPENSION RESPIRATORY (INHALATION) at 19:16

## 2024-02-03 RX ADMIN — ANTI-FUNGAL POWDER MICONAZOLE NITRATE TALC FREE: 1.42 POWDER TOPICAL at 22:59

## 2024-02-03 RX ADMIN — TAMSULOSIN HYDROCHLORIDE 0.4 MG: 0.4 CAPSULE ORAL at 08:49

## 2024-02-03 RX ADMIN — BUMETANIDE 0.5 MG: 0.25 INJECTION, SOLUTION INTRAMUSCULAR; INTRAVENOUS at 22:29

## 2024-02-03 RX ADMIN — IPRATROPIUM BROMIDE AND ALBUTEROL SULFATE 1 DOSE: 2.5; .5 SOLUTION RESPIRATORY (INHALATION) at 13:38

## 2024-02-03 RX ADMIN — BUDESONIDE INHALATION 500 MCG: 0.5 SUSPENSION RESPIRATORY (INHALATION) at 07:58

## 2024-02-03 RX ADMIN — ANTI-FUNGAL POWDER MICONAZOLE NITRATE TALC FREE: 1.42 POWDER TOPICAL at 00:17

## 2024-02-03 RX ADMIN — SODIUM CHLORIDE, PRESERVATIVE FREE 10 ML: 5 INJECTION INTRAVENOUS at 08:50

## 2024-02-03 RX ADMIN — TRAZODONE HYDROCHLORIDE 50 MG: 50 TABLET ORAL at 00:16

## 2024-02-03 RX ADMIN — BUMETANIDE 0.5 MG: 0.25 INJECTION, SOLUTION INTRAMUSCULAR; INTRAVENOUS at 16:42

## 2024-02-03 RX ADMIN — INSULIN LISPRO 2 UNITS: 100 INJECTION, SOLUTION INTRAVENOUS; SUBCUTANEOUS at 16:42

## 2024-02-03 RX ADMIN — CARVEDILOL 3.12 MG: 3.12 TABLET, FILM COATED ORAL at 17:56

## 2024-02-03 RX ADMIN — SODIUM CHLORIDE, PRESERVATIVE FREE 10 ML: 5 INJECTION INTRAVENOUS at 22:29

## 2024-02-03 NOTE — PLAN OF CARE
Problem: Discharge Planning  Goal: Discharge to home or other facility with appropriate resources  Outcome: Progressing  Flowsheets  Taken 2/2/2024 1945 by Alyson Lopez RN  Discharge to home or other facility with appropriate resources:   Identify barriers to discharge with patient and caregiver   Arrange for needed discharge resources and transportation as appropriate  Taken 2/2/2024 1220 by Luis Yao RN  Discharge to home or other facility with appropriate resources: Identify barriers to discharge with patient and caregiver     Problem: Safety - Adult  Goal: Free from fall injury  Outcome: Progressing  Flowsheets (Taken 2/2/2024 1945)  Free From Fall Injury: Instruct family/caregiver on patient safety

## 2024-02-03 NOTE — ICUWATCH
RRT Clinical Rounding Nurse Progress Report      SUBJECTIVE: Patient assessed secondary to transfer from critical care.      Vitals:    02/03/24 0615 02/03/24 0721 02/03/24 0759 02/03/24 1119   BP:  (!) 107/54  125/69   Pulse:  79  80   Resp:  20 18    Temp:  98.2 °F (36.8 °C)     TempSrc:  Axillary  Axillary   SpO2:  98% 96% 95%   Weight: 127 kg (279 lb 15.8 oz)      Height:            DETERIORATION INDEX SCORE: 47    ASSESSMENT:  Patient is A&Ox4. Denies any pain or SOB. Remains on 2L NC. Bilateral lung sounds clear. V paced per bedside monitor. VSS. VS, labs, and progress notes reviewed. No needs or concerns expressed at this time.     PLAN:  Will discharge from RRT Clinical Rounding Program per protocol. Please call if needed.    Edgar Doyle RN  [place]: [phone]

## 2024-02-03 NOTE — ICUWATCH
RRT Clinical Rounding Nurse Progress Report      SUBJECTIVE: Patient assessed secondary to transfer from critical care.      Vitals:    02/02/24 1220 02/02/24 1458 02/02/24 1523 02/02/24 1945   BP: 111/67 112/67  109/65   Pulse: 81 80 80 80   Resp:  16 16 18   Temp:  98.2 °F (36.8 °C)  98.1 °F (36.7 °C)   TempSrc: Oral Oral  Oral   SpO2: 100% 100% 100% 95%   Weight:       Height:            DETERIORATION INDEX SCORE: 44    ASSESSMENT:  Pt is A&O x4 and appears to be in NAD at this time. O2 sat 95% on 2L NC, paced on tele monitor- HR 82. Pt denies any pain and voices no complaints. Discussed pt with primary RN.      PLAN:  Will follow per RRT Clinical Rounding Program protocol.    Norma Sanchez RN  [facility]: [phone]  [facility]: [phone]

## 2024-02-03 NOTE — ICUWATCH
RRT Clinical Rounding Nurse Update    Vitals:    02/02/24 1523 02/02/24 1945 02/02/24 2140 02/02/24 2320   BP:  109/65  119/64   Pulse: 80 80 80 80   Resp: 16 18 24 20   Temp:  98.1 °F (36.7 °C)  97.3 °F (36.3 °C)   TempSrc:  Oral  Oral   SpO2: 100% 95% 92% 97%   Weight:       Height:            DETERIORATION INDEX SCORE: 39    ASSESSMENT:  Previous outreach assessment was reviewed. There have been no significant changes since previous assessment.    PLAN:  Will follow per RRT Clinical Rounding Program protocol.    Norma Sanchez RN  [place]: [phone]  [place]: [phone]

## 2024-02-04 PROBLEM — R31.9 HEMATURIA: Status: ACTIVE | Noted: 2024-02-04

## 2024-02-04 LAB
ANION GAP SERPL CALC-SCNC: 0 MMOL/L (ref 2–11)
BUN SERPL-MCNC: 41 MG/DL (ref 8–23)
CALCIUM SERPL-MCNC: 8.7 MG/DL (ref 8.3–10.4)
CHLORIDE SERPL-SCNC: 106 MMOL/L (ref 103–113)
CO2 SERPL-SCNC: 35 MMOL/L (ref 21–32)
CREAT SERPL-MCNC: 1.7 MG/DL (ref 0.8–1.5)
ERYTHROCYTE [DISTWIDTH] IN BLOOD BY AUTOMATED COUNT: 15.3 % (ref 11.9–14.6)
GLUCOSE BLD STRIP.AUTO-MCNC: 167 MG/DL (ref 65–100)
GLUCOSE BLD STRIP.AUTO-MCNC: 170 MG/DL (ref 65–100)
GLUCOSE BLD STRIP.AUTO-MCNC: 201 MG/DL (ref 65–100)
GLUCOSE SERPL-MCNC: 170 MG/DL (ref 65–100)
HCT VFR BLD AUTO: 37.1 % (ref 41.1–50.3)
HGB BLD-MCNC: 11.4 G/DL (ref 13.6–17.2)
MCH RBC QN AUTO: 28.6 PG (ref 26.1–32.9)
MCHC RBC AUTO-ENTMCNC: 30.7 G/DL (ref 31.4–35)
MCV RBC AUTO: 93.2 FL (ref 82–102)
NRBC # BLD: 0 K/UL (ref 0–0.2)
PLATELET # BLD AUTO: 145 K/UL (ref 150–450)
PMV BLD AUTO: 9.9 FL (ref 9.4–12.3)
POTASSIUM SERPL-SCNC: 4.4 MMOL/L (ref 3.5–5.1)
RBC # BLD AUTO: 3.98 M/UL (ref 4.23–5.6)
SERVICE CMNT-IMP: ABNORMAL
SODIUM SERPL-SCNC: 141 MMOL/L (ref 136–146)
WBC # BLD AUTO: 8.1 K/UL (ref 4.3–11.1)

## 2024-02-04 PROCEDURE — 99232 SBSQ HOSP IP/OBS MODERATE 35: CPT | Performed by: INTERNAL MEDICINE

## 2024-02-04 PROCEDURE — 94760 N-INVAS EAR/PLS OXIMETRY 1: CPT

## 2024-02-04 PROCEDURE — 6370000000 HC RX 637 (ALT 250 FOR IP): Performed by: HOSPITALIST

## 2024-02-04 PROCEDURE — 2140000001 HC CVICU INTERMEDIATE R&B

## 2024-02-04 PROCEDURE — 6360000002 HC RX W HCPCS: Performed by: INTERNAL MEDICINE

## 2024-02-04 PROCEDURE — 6370000000 HC RX 637 (ALT 250 FOR IP): Performed by: INTERNAL MEDICINE

## 2024-02-04 PROCEDURE — 99221 1ST HOSP IP/OBS SF/LOW 40: CPT | Performed by: PHYSICIAN ASSISTANT

## 2024-02-04 PROCEDURE — 2700000000 HC OXYGEN THERAPY PER DAY

## 2024-02-04 PROCEDURE — 94640 AIRWAY INHALATION TREATMENT: CPT

## 2024-02-04 PROCEDURE — 80048 BASIC METABOLIC PNL TOTAL CA: CPT

## 2024-02-04 PROCEDURE — 85027 COMPLETE CBC AUTOMATED: CPT

## 2024-02-04 PROCEDURE — 2580000003 HC RX 258: Performed by: INTERNAL MEDICINE

## 2024-02-04 PROCEDURE — 36415 COLL VENOUS BLD VENIPUNCTURE: CPT

## 2024-02-04 PROCEDURE — 82962 GLUCOSE BLOOD TEST: CPT

## 2024-02-04 RX ORDER — HYDRALAZINE HYDROCHLORIDE 10 MG/1
10 TABLET, FILM COATED ORAL EVERY 8 HOURS SCHEDULED
Status: DISCONTINUED | OUTPATIENT
Start: 2024-02-04 | End: 2024-02-09 | Stop reason: HOSPADM

## 2024-02-04 RX ORDER — HYDRALAZINE HYDROCHLORIDE 50 MG/1
25 TABLET, FILM COATED ORAL EVERY 8 HOURS SCHEDULED
Status: DISCONTINUED | OUTPATIENT
Start: 2024-02-04 | End: 2024-02-04

## 2024-02-04 RX ORDER — DIMETHICONE, CAMPHOR (SYNTHETIC), MENTHOL, AND PHENOL 1.1; .5; .625; .5 G/100G; G/100G; G/100G; G/100G
OINTMENT TOPICAL PRN
Status: DISCONTINUED | OUTPATIENT
Start: 2024-02-04 | End: 2024-02-09 | Stop reason: HOSPADM

## 2024-02-04 RX ORDER — LACTULOSE 10 G/15ML
20 SOLUTION ORAL DAILY PRN
Status: DISCONTINUED | OUTPATIENT
Start: 2024-02-04 | End: 2024-02-09 | Stop reason: HOSPADM

## 2024-02-04 RX ADMIN — BUDESONIDE INHALATION 500 MCG: 0.5 SUSPENSION RESPIRATORY (INHALATION) at 07:48

## 2024-02-04 RX ADMIN — ROSUVASTATIN CALCIUM 10 MG: 20 TABLET, FILM COATED ORAL at 10:18

## 2024-02-04 RX ADMIN — TRAZODONE HYDROCHLORIDE 50 MG: 50 TABLET ORAL at 20:34

## 2024-02-04 RX ADMIN — IPRATROPIUM BROMIDE AND ALBUTEROL SULFATE 1 DOSE: 2.5; .5 SOLUTION RESPIRATORY (INHALATION) at 07:48

## 2024-02-04 RX ADMIN — APIXABAN 2.5 MG: 2.5 TABLET, FILM COATED ORAL at 10:18

## 2024-02-04 RX ADMIN — INSULIN LISPRO 4 UNITS: 100 INJECTION, SOLUTION INTRAVENOUS; SUBCUTANEOUS at 17:24

## 2024-02-04 RX ADMIN — TAMSULOSIN HYDROCHLORIDE 0.4 MG: 0.4 CAPSULE ORAL at 10:18

## 2024-02-04 RX ADMIN — SODIUM CHLORIDE, PRESERVATIVE FREE 10 ML: 5 INJECTION INTRAVENOUS at 20:40

## 2024-02-04 RX ADMIN — CARVEDILOL 3.12 MG: 3.12 TABLET, FILM COATED ORAL at 17:25

## 2024-02-04 RX ADMIN — ANTI-FUNGAL POWDER MICONAZOLE NITRATE TALC FREE: 1.42 POWDER TOPICAL at 10:23

## 2024-02-04 RX ADMIN — DOCUSATE SODIUM 50 MG AND SENNOSIDES 8.6 MG 2 TABLET: 8.6; 5 TABLET, FILM COATED ORAL at 20:34

## 2024-02-04 RX ADMIN — SODIUM CHLORIDE, PRESERVATIVE FREE 10 ML: 5 INJECTION INTRAVENOUS at 10:19

## 2024-02-04 RX ADMIN — BUMETANIDE 0.5 MG: 0.25 INJECTION, SOLUTION INTRAMUSCULAR; INTRAVENOUS at 10:19

## 2024-02-04 RX ADMIN — IPRATROPIUM BROMIDE AND ALBUTEROL SULFATE 1 DOSE: 2.5; .5 SOLUTION RESPIRATORY (INHALATION) at 19:32

## 2024-02-04 RX ADMIN — MAGNESIUM HYDROXIDE 30 ML: 400 SUSPENSION ORAL at 20:34

## 2024-02-04 RX ADMIN — Medication: at 22:50

## 2024-02-04 RX ADMIN — BUMETANIDE 0.5 MG: 0.25 INJECTION, SOLUTION INTRAMUSCULAR; INTRAVENOUS at 20:34

## 2024-02-04 RX ADMIN — BUDESONIDE INHALATION 500 MCG: 0.5 SUSPENSION RESPIRATORY (INHALATION) at 19:32

## 2024-02-04 RX ADMIN — ASPIRIN 81 MG 81 MG: 81 TABLET ORAL at 10:18

## 2024-02-04 RX ADMIN — ANTI-FUNGAL POWDER MICONAZOLE NITRATE TALC FREE: 1.42 POWDER TOPICAL at 20:40

## 2024-02-04 RX ADMIN — Medication: at 10:27

## 2024-02-04 RX ADMIN — HYDRALAZINE HYDROCHLORIDE 10 MG: 10 TABLET, FILM COATED ORAL at 20:34

## 2024-02-04 RX ADMIN — SODIUM CHLORIDE, PRESERVATIVE FREE 10 ML: 5 INJECTION INTRAVENOUS at 10:21

## 2024-02-04 RX ADMIN — CARVEDILOL 3.12 MG: 3.12 TABLET, FILM COATED ORAL at 10:18

## 2024-02-04 RX ADMIN — LACTULOSE 20 G: 20 SOLUTION ORAL at 10:22

## 2024-02-04 RX ADMIN — APIXABAN 2.5 MG: 2.5 TABLET, FILM COATED ORAL at 20:34

## 2024-02-04 RX ADMIN — POLYETHYLENE GLYCOL 3350 17 G: 17 POWDER, FOR SOLUTION ORAL at 10:22

## 2024-02-04 RX ADMIN — ONDANSETRON 4 MG: 2 INJECTION INTRAMUSCULAR; INTRAVENOUS at 10:30

## 2024-02-04 RX ADMIN — ONDANSETRON 4 MG: 2 INJECTION INTRAMUSCULAR; INTRAVENOUS at 04:11

## 2024-02-04 ASSESSMENT — PAIN SCALES - GENERAL
PAINLEVEL_OUTOF10: 0
PAINLEVEL_OUTOF10: 0
PAINLEVEL_OUTOF10: 3

## 2024-02-04 ASSESSMENT — PAIN - FUNCTIONAL ASSESSMENT: PAIN_FUNCTIONAL_ASSESSMENT: ACTIVITIES ARE NOT PREVENTED

## 2024-02-04 ASSESSMENT — PAIN DESCRIPTION - ORIENTATION: ORIENTATION: RIGHT;LOWER

## 2024-02-04 ASSESSMENT — PAIN DESCRIPTION - LOCATION: LOCATION: LEG

## 2024-02-04 ASSESSMENT — PAIN DESCRIPTION - DESCRIPTORS: DESCRIPTORS: SORE

## 2024-02-04 NOTE — PLAN OF CARE
Problem: Discharge Planning  Goal: Discharge to home or other facility with appropriate resources  Outcome: Progressing  Flowsheets (Taken 2/3/2024 1947)  Discharge to home or other facility with appropriate resources: Identify barriers to discharge with patient and caregiver     Problem: Safety - Adult  Goal: Free from fall injury  Outcome: Progressing

## 2024-02-05 PROBLEM — E87.70 HYPERVOLEMIA: Status: ACTIVE | Noted: 2024-02-05

## 2024-02-05 LAB
ANION GAP SERPL CALC-SCNC: 3 MMOL/L (ref 2–11)
BUN SERPL-MCNC: 38 MG/DL (ref 8–23)
CALCIUM SERPL-MCNC: 9.1 MG/DL (ref 8.3–10.4)
CHLORIDE SERPL-SCNC: 105 MMOL/L (ref 103–113)
CO2 SERPL-SCNC: 34 MMOL/L (ref 21–32)
CREAT SERPL-MCNC: 1.7 MG/DL (ref 0.8–1.5)
ERYTHROCYTE [DISTWIDTH] IN BLOOD BY AUTOMATED COUNT: 15.2 % (ref 11.9–14.6)
GLUCOSE BLD STRIP.AUTO-MCNC: 171 MG/DL (ref 65–100)
GLUCOSE BLD STRIP.AUTO-MCNC: 176 MG/DL (ref 65–100)
GLUCOSE BLD STRIP.AUTO-MCNC: 198 MG/DL (ref 65–100)
GLUCOSE BLD STRIP.AUTO-MCNC: 227 MG/DL (ref 65–100)
GLUCOSE SERPL-MCNC: 184 MG/DL (ref 65–100)
HCT VFR BLD AUTO: 36.5 % (ref 41.1–50.3)
HGB BLD-MCNC: 11.1 G/DL (ref 13.6–17.2)
MCH RBC QN AUTO: 28.8 PG (ref 26.1–32.9)
MCHC RBC AUTO-ENTMCNC: 30.4 G/DL (ref 31.4–35)
MCV RBC AUTO: 94.6 FL (ref 82–102)
NRBC # BLD: 0 K/UL (ref 0–0.2)
PLATELET # BLD AUTO: 143 K/UL (ref 150–450)
PMV BLD AUTO: 9.7 FL (ref 9.4–12.3)
POTASSIUM SERPL-SCNC: 5 MMOL/L (ref 3.5–5.1)
RBC # BLD AUTO: 3.86 M/UL (ref 4.23–5.6)
SERVICE CMNT-IMP: ABNORMAL
SODIUM SERPL-SCNC: 142 MMOL/L (ref 136–146)
WBC # BLD AUTO: 8.6 K/UL (ref 4.3–11.1)

## 2024-02-05 PROCEDURE — 6370000000 HC RX 637 (ALT 250 FOR IP): Performed by: HOSPITALIST

## 2024-02-05 PROCEDURE — 80048 BASIC METABOLIC PNL TOTAL CA: CPT

## 2024-02-05 PROCEDURE — 2580000003 HC RX 258: Performed by: INTERNAL MEDICINE

## 2024-02-05 PROCEDURE — 6360000002 HC RX W HCPCS: Performed by: INTERNAL MEDICINE

## 2024-02-05 PROCEDURE — 6370000000 HC RX 637 (ALT 250 FOR IP): Performed by: INTERNAL MEDICINE

## 2024-02-05 PROCEDURE — 36415 COLL VENOUS BLD VENIPUNCTURE: CPT

## 2024-02-05 PROCEDURE — 99232 SBSQ HOSP IP/OBS MODERATE 35: CPT | Performed by: INTERNAL MEDICINE

## 2024-02-05 PROCEDURE — 82962 GLUCOSE BLOOD TEST: CPT

## 2024-02-05 PROCEDURE — 94640 AIRWAY INHALATION TREATMENT: CPT

## 2024-02-05 PROCEDURE — 97530 THERAPEUTIC ACTIVITIES: CPT

## 2024-02-05 PROCEDURE — 2700000000 HC OXYGEN THERAPY PER DAY

## 2024-02-05 PROCEDURE — 94761 N-INVAS EAR/PLS OXIMETRY MLT: CPT

## 2024-02-05 PROCEDURE — 2140000001 HC CVICU INTERMEDIATE R&B

## 2024-02-05 PROCEDURE — 85027 COMPLETE CBC AUTOMATED: CPT

## 2024-02-05 PROCEDURE — 94760 N-INVAS EAR/PLS OXIMETRY 1: CPT

## 2024-02-05 RX ORDER — BISACODYL 10 MG
10 SUPPOSITORY, RECTAL RECTAL DAILY PRN
Status: DISCONTINUED | OUTPATIENT
Start: 2024-02-05 | End: 2024-02-09 | Stop reason: HOSPADM

## 2024-02-05 RX ORDER — TRAMADOL HYDROCHLORIDE 50 MG/1
50 TABLET ORAL ONCE
Status: COMPLETED | OUTPATIENT
Start: 2024-02-05 | End: 2024-02-05

## 2024-02-05 RX ADMIN — TAMSULOSIN HYDROCHLORIDE 0.4 MG: 0.4 CAPSULE ORAL at 09:39

## 2024-02-05 RX ADMIN — ROSUVASTATIN CALCIUM 10 MG: 20 TABLET, FILM COATED ORAL at 09:38

## 2024-02-05 RX ADMIN — BISACODYL 10 MG: 10 SUPPOSITORY RECTAL at 13:05

## 2024-02-05 RX ADMIN — ASPIRIN 81 MG 81 MG: 81 TABLET ORAL at 09:38

## 2024-02-05 RX ADMIN — CARVEDILOL 3.12 MG: 3.12 TABLET, FILM COATED ORAL at 09:38

## 2024-02-05 RX ADMIN — BUDESONIDE INHALATION 500 MCG: 0.5 SUSPENSION RESPIRATORY (INHALATION) at 20:57

## 2024-02-05 RX ADMIN — TRAZODONE HYDROCHLORIDE 50 MG: 50 TABLET ORAL at 20:39

## 2024-02-05 RX ADMIN — SODIUM CHLORIDE, PRESERVATIVE FREE 10 ML: 5 INJECTION INTRAVENOUS at 09:41

## 2024-02-05 RX ADMIN — BUDESONIDE INHALATION 500 MCG: 0.5 SUSPENSION RESPIRATORY (INHALATION) at 07:45

## 2024-02-05 RX ADMIN — SODIUM CHLORIDE, PRESERVATIVE FREE 10 ML: 5 INJECTION INTRAVENOUS at 20:39

## 2024-02-05 RX ADMIN — ANTI-FUNGAL POWDER MICONAZOLE NITRATE TALC FREE: 1.42 POWDER TOPICAL at 09:41

## 2024-02-05 RX ADMIN — HYDRALAZINE HYDROCHLORIDE 10 MG: 10 TABLET, FILM COATED ORAL at 06:03

## 2024-02-05 RX ADMIN — IPRATROPIUM BROMIDE AND ALBUTEROL SULFATE 1 DOSE: 2.5; .5 SOLUTION RESPIRATORY (INHALATION) at 14:11

## 2024-02-05 RX ADMIN — ONDANSETRON 4 MG: 2 INJECTION INTRAMUSCULAR; INTRAVENOUS at 04:01

## 2024-02-05 RX ADMIN — APIXABAN 2.5 MG: 2.5 TABLET, FILM COATED ORAL at 09:38

## 2024-02-05 RX ADMIN — IPRATROPIUM BROMIDE AND ALBUTEROL SULFATE 1 DOSE: 2.5; .5 SOLUTION RESPIRATORY (INHALATION) at 07:45

## 2024-02-05 RX ADMIN — Medication: at 09:41

## 2024-02-05 RX ADMIN — INSULIN LISPRO 2 UNITS: 100 INJECTION, SOLUTION INTRAVENOUS; SUBCUTANEOUS at 19:22

## 2024-02-05 RX ADMIN — TRAMADOL HYDROCHLORIDE 50 MG: 50 TABLET ORAL at 04:01

## 2024-02-05 RX ADMIN — IPRATROPIUM BROMIDE AND ALBUTEROL SULFATE 1 DOSE: 2.5; .5 SOLUTION RESPIRATORY (INHALATION) at 20:57

## 2024-02-05 RX ADMIN — BUMETANIDE 0.5 MG: 0.25 INJECTION, SOLUTION INTRAMUSCULAR; INTRAVENOUS at 20:38

## 2024-02-05 RX ADMIN — BUMETANIDE 0.5 MG: 0.25 INJECTION, SOLUTION INTRAMUSCULAR; INTRAVENOUS at 09:39

## 2024-02-05 RX ADMIN — APIXABAN 2.5 MG: 2.5 TABLET, FILM COATED ORAL at 20:39

## 2024-02-05 RX ADMIN — CARVEDILOL 3.12 MG: 3.12 TABLET, FILM COATED ORAL at 19:23

## 2024-02-05 RX ADMIN — ANTI-FUNGAL POWDER MICONAZOLE NITRATE TALC FREE: 1.42 POWDER TOPICAL at 20:57

## 2024-02-05 ASSESSMENT — PAIN SCALES - GENERAL
PAINLEVEL_OUTOF10: 6
PAINLEVEL_OUTOF10: 0

## 2024-02-05 NOTE — PLAN OF CARE
Problem: Discharge Planning  Goal: Discharge to home or other facility with appropriate resources  Outcome: Progressing  Flowsheets (Taken 2/4/2024 1901)  Discharge to home or other facility with appropriate resources: Identify barriers to discharge with patient and caregiver     Problem: Safety - Adult  Goal: Free from fall injury  Outcome: Progressing

## 2024-02-05 NOTE — CARE COORDINATION
CM spoke to patient at bedside and informed him he has been accepted to [facility] for rehab. Patient stated that he would prefer to go home with home health. Patient had no preference of home health agency. CM sent referral to [facility] with patient's permission.     CM to follow up.

## 2024-02-06 ENCOUNTER — TELEPHONE (OUTPATIENT)
Dept: FAMILY MEDICINE CLINIC | Facility: CLINIC | Age: 84
End: 2024-02-06

## 2024-02-06 ENCOUNTER — APPOINTMENT (OUTPATIENT)
Dept: GENERAL RADIOLOGY | Age: 84
DRG: 242 | End: 2024-02-06
Payer: MEDICARE

## 2024-02-06 ENCOUNTER — HOME HEALTH ADMISSION (OUTPATIENT)
Dept: HOME HEALTH SERVICES | Facility: HOME HEALTH | Age: 84
End: 2024-02-06
Payer: MEDICARE

## 2024-02-06 LAB
ANION GAP SERPL CALC-SCNC: 1 MMOL/L (ref 2–11)
BUN SERPL-MCNC: 35 MG/DL (ref 8–23)
CALCIUM SERPL-MCNC: 9 MG/DL (ref 8.3–10.4)
CHLORIDE SERPL-SCNC: 103 MMOL/L (ref 103–113)
CO2 SERPL-SCNC: 37 MMOL/L (ref 21–32)
CREAT SERPL-MCNC: 1.6 MG/DL (ref 0.8–1.5)
GLUCOSE BLD STRIP.AUTO-MCNC: 175 MG/DL (ref 65–100)
GLUCOSE BLD STRIP.AUTO-MCNC: 196 MG/DL (ref 65–100)
GLUCOSE BLD STRIP.AUTO-MCNC: 199 MG/DL (ref 65–100)
GLUCOSE BLD STRIP.AUTO-MCNC: 207 MG/DL (ref 65–100)
GLUCOSE SERPL-MCNC: 186 MG/DL (ref 65–100)
POTASSIUM SERPL-SCNC: 4.3 MMOL/L (ref 3.5–5.1)
SERVICE CMNT-IMP: ABNORMAL
SODIUM SERPL-SCNC: 141 MMOL/L (ref 136–146)

## 2024-02-06 PROCEDURE — 94760 N-INVAS EAR/PLS OXIMETRY 1: CPT

## 2024-02-06 PROCEDURE — 80048 BASIC METABOLIC PNL TOTAL CA: CPT

## 2024-02-06 PROCEDURE — 94640 AIRWAY INHALATION TREATMENT: CPT

## 2024-02-06 PROCEDURE — 99232 SBSQ HOSP IP/OBS MODERATE 35: CPT | Performed by: INTERNAL MEDICINE

## 2024-02-06 PROCEDURE — 6370000000 HC RX 637 (ALT 250 FOR IP): Performed by: INTERNAL MEDICINE

## 2024-02-06 PROCEDURE — 71045 X-RAY EXAM CHEST 1 VIEW: CPT

## 2024-02-06 PROCEDURE — 2140000001 HC CVICU INTERMEDIATE R&B

## 2024-02-06 PROCEDURE — 6370000000 HC RX 637 (ALT 250 FOR IP): Performed by: HOSPITALIST

## 2024-02-06 PROCEDURE — 36415 COLL VENOUS BLD VENIPUNCTURE: CPT

## 2024-02-06 PROCEDURE — 2580000003 HC RX 258: Performed by: INTERNAL MEDICINE

## 2024-02-06 PROCEDURE — 82962 GLUCOSE BLOOD TEST: CPT

## 2024-02-06 PROCEDURE — 2700000000 HC OXYGEN THERAPY PER DAY

## 2024-02-06 PROCEDURE — 6360000002 HC RX W HCPCS: Performed by: INTERNAL MEDICINE

## 2024-02-06 RX ORDER — BUMETANIDE 1 MG/1
0.5 TABLET ORAL 2 TIMES DAILY
Status: DISCONTINUED | OUTPATIENT
Start: 2024-02-06 | End: 2024-02-06

## 2024-02-06 RX ORDER — BUMETANIDE 1 MG/1
1 TABLET ORAL DAILY
Status: DISCONTINUED | OUTPATIENT
Start: 2024-02-06 | End: 2024-02-09 | Stop reason: HOSPADM

## 2024-02-06 RX ORDER — ONDANSETRON 4 MG/1
4 TABLET, ORALLY DISINTEGRATING ORAL EVERY 4 HOURS PRN
Status: DISCONTINUED | OUTPATIENT
Start: 2024-02-06 | End: 2024-02-09 | Stop reason: HOSPADM

## 2024-02-06 RX ORDER — BACLOFEN 10 MG/1
10 TABLET ORAL 3 TIMES DAILY PRN
Status: DISCONTINUED | OUTPATIENT
Start: 2024-02-06 | End: 2024-02-09 | Stop reason: HOSPADM

## 2024-02-06 RX ADMIN — TAMSULOSIN HYDROCHLORIDE 0.4 MG: 0.4 CAPSULE ORAL at 10:16

## 2024-02-06 RX ADMIN — IPRATROPIUM BROMIDE AND ALBUTEROL SULFATE 1 DOSE: 2.5; .5 SOLUTION RESPIRATORY (INHALATION) at 08:00

## 2024-02-06 RX ADMIN — SODIUM CHLORIDE, PRESERVATIVE FREE 10 ML: 5 INJECTION INTRAVENOUS at 20:21

## 2024-02-06 RX ADMIN — Medication: at 09:13

## 2024-02-06 RX ADMIN — BUDESONIDE INHALATION 500 MCG: 0.5 SUSPENSION RESPIRATORY (INHALATION) at 20:09

## 2024-02-06 RX ADMIN — ANTI-FUNGAL POWDER MICONAZOLE NITRATE TALC FREE: 1.42 POWDER TOPICAL at 09:14

## 2024-02-06 RX ADMIN — ALUMINUM HYDROXIDE, MAGNESIUM HYDROXIDE, AND SIMETHICONE 15 ML: 1200; 120; 1200 SUSPENSION ORAL at 15:14

## 2024-02-06 RX ADMIN — HYDRALAZINE HYDROCHLORIDE 10 MG: 10 TABLET, FILM COATED ORAL at 14:53

## 2024-02-06 RX ADMIN — IPRATROPIUM BROMIDE AND ALBUTEROL SULFATE 1 DOSE: 2.5; .5 SOLUTION RESPIRATORY (INHALATION) at 20:09

## 2024-02-06 RX ADMIN — ANTI-FUNGAL POWDER MICONAZOLE NITRATE TALC FREE: 1.42 POWDER TOPICAL at 20:21

## 2024-02-06 RX ADMIN — APIXABAN 2.5 MG: 2.5 TABLET, FILM COATED ORAL at 09:12

## 2024-02-06 RX ADMIN — BUDESONIDE INHALATION 500 MCG: 0.5 SUSPENSION RESPIRATORY (INHALATION) at 08:00

## 2024-02-06 RX ADMIN — IPRATROPIUM BROMIDE AND ALBUTEROL SULFATE 1 DOSE: 2.5; .5 SOLUTION RESPIRATORY (INHALATION) at 14:06

## 2024-02-06 RX ADMIN — TRAZODONE HYDROCHLORIDE 50 MG: 50 TABLET ORAL at 20:21

## 2024-02-06 RX ADMIN — BUMETANIDE 1 MG: 1 TABLET ORAL at 09:12

## 2024-02-06 RX ADMIN — CARVEDILOL 3.12 MG: 3.12 TABLET, FILM COATED ORAL at 17:51

## 2024-02-06 RX ADMIN — SODIUM CHLORIDE, PRESERVATIVE FREE 10 ML: 5 INJECTION INTRAVENOUS at 09:12

## 2024-02-06 RX ADMIN — SODIUM CHLORIDE, PRESERVATIVE FREE 10 ML: 5 INJECTION INTRAVENOUS at 11:57

## 2024-02-06 RX ADMIN — ASPIRIN 81 MG 81 MG: 81 TABLET ORAL at 09:11

## 2024-02-06 RX ADMIN — APIXABAN 2.5 MG: 2.5 TABLET, FILM COATED ORAL at 20:30

## 2024-02-06 RX ADMIN — ROSUVASTATIN CALCIUM 10 MG: 20 TABLET, FILM COATED ORAL at 09:11

## 2024-02-06 RX ADMIN — CARVEDILOL 3.12 MG: 3.12 TABLET, FILM COATED ORAL at 09:12

## 2024-02-06 ASSESSMENT — PAIN SCALES - GENERAL: PAINLEVEL_OUTOF10: 0

## 2024-02-06 NOTE — TELEPHONE ENCOUNTER
Zuleyka from [facility] called and wants to know if Dr. Tavares will follow orders for Home Care PT and OT.

## 2024-02-06 NOTE — CARE COORDINATION
CM spoke to Zuleyka at [facility] who stated that they are waiting for patient's PCP to get back to [facility] to sign home health care orders but that does not have to delay discharge. [facility] will follow patient once orders are signed. Zuleyka is aware but is scheduled to be discharged home today.

## 2024-02-06 NOTE — PLAN OF CARE
Problem: Discharge Planning  Goal: Discharge to home or other facility with appropriate resources  Outcome: Progressing  Flowsheets (Taken 2/5/2024 1910)  Discharge to home or other facility with appropriate resources:   Identify barriers to discharge with patient and caregiver   Arrange for needed discharge resources and transportation as appropriate     Problem: Safety - Adult  Goal: Free from fall injury  Outcome: Progressing  Flowsheets (Taken 2/5/2024 1910)  Free From Fall Injury: Instruct family/caregiver on patient safety

## 2024-02-07 PROBLEM — I42.0 DILATED CARDIOMYOPATHY (HCC): Status: ACTIVE | Noted: 2024-02-07

## 2024-02-07 LAB
ANION GAP SERPL CALC-SCNC: 1 MMOL/L (ref 2–11)
BUN SERPL-MCNC: 34 MG/DL (ref 8–23)
CALCIUM SERPL-MCNC: 8.9 MG/DL (ref 8.3–10.4)
CHLORIDE SERPL-SCNC: 100 MMOL/L (ref 103–113)
CO2 SERPL-SCNC: 40 MMOL/L (ref 21–32)
CREAT SERPL-MCNC: 1.6 MG/DL (ref 0.8–1.5)
ERYTHROCYTE [DISTWIDTH] IN BLOOD BY AUTOMATED COUNT: 15 % (ref 11.9–14.6)
GLUCOSE BLD STRIP.AUTO-MCNC: 201 MG/DL (ref 65–100)
GLUCOSE BLD STRIP.AUTO-MCNC: 225 MG/DL (ref 65–100)
GLUCOSE BLD STRIP.AUTO-MCNC: 257 MG/DL (ref 65–100)
GLUCOSE BLD STRIP.AUTO-MCNC: 293 MG/DL (ref 65–100)
GLUCOSE SERPL-MCNC: 211 MG/DL (ref 65–100)
HCT VFR BLD AUTO: 34.3 % (ref 41.1–50.3)
HGB BLD-MCNC: 10.5 G/DL (ref 13.6–17.2)
MCH RBC QN AUTO: 28.9 PG (ref 26.1–32.9)
MCHC RBC AUTO-ENTMCNC: 30.6 G/DL (ref 31.4–35)
MCV RBC AUTO: 94.5 FL (ref 82–102)
NRBC # BLD: 0 K/UL (ref 0–0.2)
PLATELET # BLD AUTO: 142 K/UL (ref 150–450)
PMV BLD AUTO: 9.3 FL (ref 9.4–12.3)
POTASSIUM SERPL-SCNC: 4.2 MMOL/L (ref 3.5–5.1)
RBC # BLD AUTO: 3.63 M/UL (ref 4.23–5.6)
SERVICE CMNT-IMP: ABNORMAL
SODIUM SERPL-SCNC: 141 MMOL/L (ref 136–146)
WBC # BLD AUTO: 7.8 K/UL (ref 4.3–11.1)

## 2024-02-07 PROCEDURE — 2580000003 HC RX 258: Performed by: INTERNAL MEDICINE

## 2024-02-07 PROCEDURE — 85027 COMPLETE CBC AUTOMATED: CPT

## 2024-02-07 PROCEDURE — 2700000000 HC OXYGEN THERAPY PER DAY

## 2024-02-07 PROCEDURE — 6370000000 HC RX 637 (ALT 250 FOR IP): Performed by: INTERNAL MEDICINE

## 2024-02-07 PROCEDURE — 36415 COLL VENOUS BLD VENIPUNCTURE: CPT

## 2024-02-07 PROCEDURE — 82962 GLUCOSE BLOOD TEST: CPT

## 2024-02-07 PROCEDURE — 80048 BASIC METABOLIC PNL TOTAL CA: CPT

## 2024-02-07 PROCEDURE — 94640 AIRWAY INHALATION TREATMENT: CPT

## 2024-02-07 PROCEDURE — 94760 N-INVAS EAR/PLS OXIMETRY 1: CPT

## 2024-02-07 PROCEDURE — 97112 NEUROMUSCULAR REEDUCATION: CPT

## 2024-02-07 PROCEDURE — 2140000001 HC CVICU INTERMEDIATE R&B

## 2024-02-07 PROCEDURE — 6360000002 HC RX W HCPCS: Performed by: INTERNAL MEDICINE

## 2024-02-07 PROCEDURE — 6370000000 HC RX 637 (ALT 250 FOR IP): Performed by: HOSPITALIST

## 2024-02-07 PROCEDURE — 97530 THERAPEUTIC ACTIVITIES: CPT

## 2024-02-07 RX ORDER — INSULIN GLARGINE 100 [IU]/ML
15 INJECTION, SOLUTION SUBCUTANEOUS NIGHTLY
Status: DISCONTINUED | OUTPATIENT
Start: 2024-02-07 | End: 2024-02-09 | Stop reason: HOSPADM

## 2024-02-07 RX ADMIN — ANTI-FUNGAL POWDER MICONAZOLE NITRATE TALC FREE: 1.42 POWDER TOPICAL at 20:57

## 2024-02-07 RX ADMIN — TRAZODONE HYDROCHLORIDE 50 MG: 50 TABLET ORAL at 20:57

## 2024-02-07 RX ADMIN — HYDRALAZINE HYDROCHLORIDE 10 MG: 10 TABLET, FILM COATED ORAL at 15:04

## 2024-02-07 RX ADMIN — ASPIRIN 81 MG 81 MG: 81 TABLET ORAL at 09:24

## 2024-02-07 RX ADMIN — INSULIN LISPRO 2 UNITS: 100 INJECTION, SOLUTION INTRAVENOUS; SUBCUTANEOUS at 09:26

## 2024-02-07 RX ADMIN — CARVEDILOL 3.12 MG: 3.12 TABLET, FILM COATED ORAL at 16:55

## 2024-02-07 RX ADMIN — ROSUVASTATIN CALCIUM 10 MG: 20 TABLET, FILM COATED ORAL at 09:23

## 2024-02-07 RX ADMIN — INSULIN LISPRO 4 UNITS: 100 INJECTION, SOLUTION INTRAVENOUS; SUBCUTANEOUS at 16:54

## 2024-02-07 RX ADMIN — CARVEDILOL 3.12 MG: 3.12 TABLET, FILM COATED ORAL at 09:23

## 2024-02-07 RX ADMIN — APIXABAN 2.5 MG: 2.5 TABLET, FILM COATED ORAL at 20:57

## 2024-02-07 RX ADMIN — SODIUM CHLORIDE, PRESERVATIVE FREE 10 ML: 5 INJECTION INTRAVENOUS at 09:25

## 2024-02-07 RX ADMIN — IPRATROPIUM BROMIDE AND ALBUTEROL SULFATE 1 DOSE: 2.5; .5 SOLUTION RESPIRATORY (INHALATION) at 14:57

## 2024-02-07 RX ADMIN — BUDESONIDE INHALATION 500 MCG: 0.5 SUSPENSION RESPIRATORY (INHALATION) at 22:08

## 2024-02-07 RX ADMIN — IPRATROPIUM BROMIDE AND ALBUTEROL SULFATE 1 DOSE: 2.5; .5 SOLUTION RESPIRATORY (INHALATION) at 22:08

## 2024-02-07 RX ADMIN — Medication: at 09:26

## 2024-02-07 RX ADMIN — DOCUSATE SODIUM 50 MG AND SENNOSIDES 8.6 MG 2 TABLET: 8.6; 5 TABLET, FILM COATED ORAL at 09:22

## 2024-02-07 RX ADMIN — INSULIN GLARGINE 15 UNITS: 100 INJECTION, SOLUTION SUBCUTANEOUS at 20:57

## 2024-02-07 RX ADMIN — ACETAMINOPHEN 650 MG: 325 TABLET ORAL at 01:34

## 2024-02-07 RX ADMIN — ANTI-FUNGAL POWDER MICONAZOLE NITRATE TALC FREE: 1.42 POWDER TOPICAL at 09:26

## 2024-02-07 RX ADMIN — HYDRALAZINE HYDROCHLORIDE 10 MG: 10 TABLET, FILM COATED ORAL at 05:53

## 2024-02-07 RX ADMIN — ALUMINUM HYDROXIDE, MAGNESIUM HYDROXIDE, AND SIMETHICONE 15 ML: 1200; 120; 1200 SUSPENSION ORAL at 09:29

## 2024-02-07 RX ADMIN — SODIUM CHLORIDE, PRESERVATIVE FREE 10 ML: 5 INJECTION INTRAVENOUS at 20:57

## 2024-02-07 RX ADMIN — HYDRALAZINE HYDROCHLORIDE 10 MG: 10 TABLET, FILM COATED ORAL at 20:57

## 2024-02-07 RX ADMIN — INSULIN LISPRO 4 UNITS: 100 INJECTION, SOLUTION INTRAVENOUS; SUBCUTANEOUS at 11:50

## 2024-02-07 RX ADMIN — BUMETANIDE 1 MG: 1 TABLET ORAL at 09:23

## 2024-02-07 RX ADMIN — IPRATROPIUM BROMIDE AND ALBUTEROL SULFATE 1 DOSE: 2.5; .5 SOLUTION RESPIRATORY (INHALATION) at 07:17

## 2024-02-07 RX ADMIN — TAMSULOSIN HYDROCHLORIDE 0.4 MG: 0.4 CAPSULE ORAL at 09:23

## 2024-02-07 RX ADMIN — POLYETHYLENE GLYCOL 3350 17 G: 17 POWDER, FOR SOLUTION ORAL at 09:29

## 2024-02-07 RX ADMIN — BUDESONIDE INHALATION 500 MCG: 0.5 SUSPENSION RESPIRATORY (INHALATION) at 07:17

## 2024-02-07 RX ADMIN — APIXABAN 2.5 MG: 2.5 TABLET, FILM COATED ORAL at 09:23

## 2024-02-07 ASSESSMENT — PAIN DESCRIPTION - DESCRIPTORS: DESCRIPTORS: ACHING;DISCOMFORT

## 2024-02-07 ASSESSMENT — PAIN DESCRIPTION - FREQUENCY: FREQUENCY: INTERMITTENT

## 2024-02-07 ASSESSMENT — PAIN SCALES - GENERAL: PAINLEVEL_OUTOF10: 4

## 2024-02-07 ASSESSMENT — PAIN DESCRIPTION - LOCATION: LOCATION: LEG

## 2024-02-07 ASSESSMENT — PAIN DESCRIPTION - ORIENTATION: ORIENTATION: RIGHT

## 2024-02-07 ASSESSMENT — PAIN - FUNCTIONAL ASSESSMENT: PAIN_FUNCTIONAL_ASSESSMENT: ACTIVITIES ARE NOT PREVENTED

## 2024-02-07 ASSESSMENT — PAIN DESCRIPTION - PAIN TYPE: TYPE: ACUTE PAIN

## 2024-02-07 ASSESSMENT — PAIN DESCRIPTION - ONSET: ONSET: GRADUAL

## 2024-02-07 NOTE — CARE COORDINATION
CM spoke to patient at bedside. Patient stated that [facility] and he does not want to go home with home health anymore because his spouse wants him to go to rehab.     CM contacted Estefania from [facility] and informed her of the above information. Estefania stated patient is still accepting and she will begin insurance authorization.

## 2024-02-07 NOTE — PLAN OF CARE
Problem: Discharge Planning  Goal: Discharge to home or other facility with appropriate resources  Outcome: Progressing  Flowsheets (Taken 2/7/2024 0715)  Discharge to home or other facility with appropriate resources: Identify barriers to discharge with patient and caregiver     Problem: Safety - Adult  Goal: Free from fall injury  Outcome: Progressing  Flowsheets (Taken 2/7/2024 0715)  Free From Fall Injury: Instruct family/caregiver on patient safety

## 2024-02-08 LAB
ANION GAP SERPL CALC-SCNC: ABNORMAL MMOL/L (ref 2–11)
BUN SERPL-MCNC: 34 MG/DL (ref 8–23)
CALCIUM SERPL-MCNC: 9.2 MG/DL (ref 8.3–10.4)
CHLORIDE SERPL-SCNC: 101 MMOL/L (ref 103–113)
CO2 SERPL-SCNC: 40 MMOL/L (ref 21–32)
CREAT SERPL-MCNC: 1.5 MG/DL (ref 0.8–1.5)
GLUCOSE BLD STRIP.AUTO-MCNC: 175 MG/DL (ref 65–100)
GLUCOSE BLD STRIP.AUTO-MCNC: 202 MG/DL (ref 65–100)
GLUCOSE BLD STRIP.AUTO-MCNC: 206 MG/DL (ref 65–100)
GLUCOSE BLD STRIP.AUTO-MCNC: 242 MG/DL (ref 65–100)
GLUCOSE SERPL-MCNC: 198 MG/DL (ref 65–100)
POTASSIUM SERPL-SCNC: 4 MMOL/L (ref 3.5–5.1)
SERVICE CMNT-IMP: ABNORMAL
SODIUM SERPL-SCNC: 139 MMOL/L (ref 136–146)

## 2024-02-08 PROCEDURE — 2580000003 HC RX 258: Performed by: INTERNAL MEDICINE

## 2024-02-08 PROCEDURE — 82962 GLUCOSE BLOOD TEST: CPT

## 2024-02-08 PROCEDURE — 6370000000 HC RX 637 (ALT 250 FOR IP): Performed by: INTERNAL MEDICINE

## 2024-02-08 PROCEDURE — 6360000002 HC RX W HCPCS: Performed by: INTERNAL MEDICINE

## 2024-02-08 PROCEDURE — 94760 N-INVAS EAR/PLS OXIMETRY 1: CPT

## 2024-02-08 PROCEDURE — 2500000003 HC RX 250 WO HCPCS: Performed by: HOSPITALIST

## 2024-02-08 PROCEDURE — 99232 SBSQ HOSP IP/OBS MODERATE 35: CPT | Performed by: INTERNAL MEDICINE

## 2024-02-08 PROCEDURE — 36415 COLL VENOUS BLD VENIPUNCTURE: CPT

## 2024-02-08 PROCEDURE — 80048 BASIC METABOLIC PNL TOTAL CA: CPT

## 2024-02-08 PROCEDURE — 94640 AIRWAY INHALATION TREATMENT: CPT

## 2024-02-08 PROCEDURE — 2140000001 HC CVICU INTERMEDIATE R&B

## 2024-02-08 PROCEDURE — 6370000000 HC RX 637 (ALT 250 FOR IP): Performed by: HOSPITALIST

## 2024-02-08 PROCEDURE — 97530 THERAPEUTIC ACTIVITIES: CPT

## 2024-02-08 PROCEDURE — 2700000000 HC OXYGEN THERAPY PER DAY

## 2024-02-08 RX ADMIN — POLYETHYLENE GLYCOL 3350 17 G: 17 POWDER, FOR SOLUTION ORAL at 04:47

## 2024-02-08 RX ADMIN — IPRATROPIUM BROMIDE AND ALBUTEROL SULFATE 1 DOSE: 2.5; .5 SOLUTION RESPIRATORY (INHALATION) at 07:32

## 2024-02-08 RX ADMIN — SODIUM CHLORIDE, PRESERVATIVE FREE 10 ML: 5 INJECTION INTRAVENOUS at 20:49

## 2024-02-08 RX ADMIN — HYDRALAZINE HYDROCHLORIDE 10 MG: 10 TABLET, FILM COATED ORAL at 20:50

## 2024-02-08 RX ADMIN — APIXABAN 2.5 MG: 2.5 TABLET, FILM COATED ORAL at 08:39

## 2024-02-08 RX ADMIN — ANTI-FUNGAL POWDER MICONAZOLE NITRATE TALC FREE: 1.42 POWDER TOPICAL at 20:49

## 2024-02-08 RX ADMIN — Medication: at 08:39

## 2024-02-08 RX ADMIN — CARVEDILOL 3.12 MG: 3.12 TABLET, FILM COATED ORAL at 08:41

## 2024-02-08 RX ADMIN — IPRATROPIUM BROMIDE AND ALBUTEROL SULFATE 1 DOSE: 2.5; .5 SOLUTION RESPIRATORY (INHALATION) at 20:06

## 2024-02-08 RX ADMIN — ROSUVASTATIN CALCIUM 10 MG: 20 TABLET, FILM COATED ORAL at 08:39

## 2024-02-08 RX ADMIN — BUMETANIDE 1 MG: 1 TABLET ORAL at 08:39

## 2024-02-08 RX ADMIN — INSULIN LISPRO 2 UNITS: 100 INJECTION, SOLUTION INTRAVENOUS; SUBCUTANEOUS at 16:59

## 2024-02-08 RX ADMIN — APIXABAN 2.5 MG: 2.5 TABLET, FILM COATED ORAL at 20:50

## 2024-02-08 RX ADMIN — IPRATROPIUM BROMIDE AND ALBUTEROL SULFATE 1 DOSE: 2.5; .5 SOLUTION RESPIRATORY (INHALATION) at 15:08

## 2024-02-08 RX ADMIN — INSULIN LISPRO 2 UNITS: 100 INJECTION, SOLUTION INTRAVENOUS; SUBCUTANEOUS at 08:38

## 2024-02-08 RX ADMIN — BUDESONIDE INHALATION 500 MCG: 0.5 SUSPENSION RESPIRATORY (INHALATION) at 07:32

## 2024-02-08 RX ADMIN — HYDRALAZINE HYDROCHLORIDE 10 MG: 10 TABLET, FILM COATED ORAL at 04:51

## 2024-02-08 RX ADMIN — TRAZODONE HYDROCHLORIDE 50 MG: 50 TABLET ORAL at 20:50

## 2024-02-08 RX ADMIN — ANTI-FUNGAL POWDER MICONAZOLE NITRATE TALC FREE: 1.42 POWDER TOPICAL at 08:39

## 2024-02-08 RX ADMIN — CARVEDILOL 3.12 MG: 3.12 TABLET, FILM COATED ORAL at 16:59

## 2024-02-08 RX ADMIN — HYDRALAZINE HYDROCHLORIDE 10 MG: 10 TABLET, FILM COATED ORAL at 14:39

## 2024-02-08 RX ADMIN — BUDESONIDE INHALATION 500 MCG: 0.5 SUSPENSION RESPIRATORY (INHALATION) at 20:06

## 2024-02-08 RX ADMIN — SODIUM CHLORIDE, PRESERVATIVE FREE 10 ML: 5 INJECTION INTRAVENOUS at 08:40

## 2024-02-08 RX ADMIN — TAMSULOSIN HYDROCHLORIDE 0.4 MG: 0.4 CAPSULE ORAL at 08:39

## 2024-02-08 RX ADMIN — INSULIN GLARGINE 15 UNITS: 100 INJECTION, SOLUTION SUBCUTANEOUS at 20:49

## 2024-02-08 RX ADMIN — ASPIRIN 81 MG 81 MG: 81 TABLET ORAL at 08:39

## 2024-02-08 RX ADMIN — MAGNESIUM HYDROXIDE 30 ML: 400 SUSPENSION ORAL at 08:39

## 2024-02-08 ASSESSMENT — PAIN SCALES - GENERAL
PAINLEVEL_OUTOF10: 0
PAINLEVEL_OUTOF10: 0

## 2024-02-08 NOTE — CARE COORDINATION
Patient has been accepted to [facility] for STR. MONISHA spoke to Chris at [facility] who stated he will start patient's insurance auth.     CM to follow up.

## 2024-02-08 NOTE — CARE COORDINATION
Per Chris, at [facility], patient's insurance is listed as an out of state insurance and patient will not be able to go to rehab.     CM spoke to patient at bedside and CM called patient's spouse to explain the situation to her, as requested by patient. Both patient and patient's spouse were notified that patient may be discharged tomorrow to go to with [facility]. Both patient and spouse are in agreement with this discharge plan.     CM to follow up.

## 2024-02-08 NOTE — PLAN OF CARE
Problem: Discharge Planning  Goal: Discharge to home or other facility with appropriate resources  Outcome: Not Progressing  Flowsheets  Taken 2/8/2024 0845 by Laine Hatfield RN  Discharge to home or other facility with appropriate resources: Identify barriers to discharge with patient and caregiver  Taken 2/7/2024 2100 by Alyson Lopez RN  Discharge to home or other facility with appropriate resources:   Identify barriers to discharge with patient and caregiver   Arrange for needed discharge resources and transportation as appropriate     Problem: Safety - Adult  Goal: Free from fall injury  Outcome: Not Progressing  Flowsheets  Taken 2/8/2024 0845 by Laine Hatfield RN  Free From Fall Injury: Instruct family/caregiver on patient safety  Taken 2/7/2024 2100 by Alyson Lopez RN  Free From Fall Injury: Instruct family/caregiver on patient safety     Problem: Discharge Planning  Goal: Discharge to home or other facility with appropriate resources  Outcome: Not Progressing  Flowsheets  Taken 2/8/2024 0845 by Laine Hatfield RN  Discharge to home or other facility with appropriate resources: Identify barriers to discharge with patient and caregiver  Taken 2/7/2024 2100 by Alyson Lopez RN  Discharge to home or other facility with appropriate resources:   Identify barriers to discharge with patient and caregiver   Arrange for needed discharge resources and transportation as appropriate     Problem: Safety - Adult  Goal: Free from fall injury  Outcome: Not Progressing  Flowsheets  Taken 2/8/2024 0845 by Laine Hatfield RN  Free From Fall Injury: Instruct family/caregiver on patient safety  Taken 2/7/2024 2100 by Alyson Lopez RN  Free From Fall Injury: Instruct family/caregiver on patient safety

## 2024-02-08 NOTE — CARE COORDINATION
CM spoke to patient at bedside today.     Patient stated if it were up to him he would prefer to go home but his spouse wants him to go to rehab. CM informed patient that [facility] can no longer accept patient because his insurance is an out-of state insurance from [place]. Patient wanted CM to send more STR referrals out to other places in [place], [place] and CM sent referrals accordingly (Patient no longer has a preference of STR facility). Patient understands if no other facility can accept him and accept his insurance, he would have to go home with home health care. Patient is in agreement.     CM to follow up.

## 2024-02-09 VITALS
OXYGEN SATURATION: 96 % | DIASTOLIC BLOOD PRESSURE: 60 MMHG | RESPIRATION RATE: 17 BRPM | HEIGHT: 69 IN | SYSTOLIC BLOOD PRESSURE: 117 MMHG | HEART RATE: 84 BPM | TEMPERATURE: 98 F | BODY MASS INDEX: 38.43 KG/M2 | WEIGHT: 259.48 LBS

## 2024-02-09 PROBLEM — I48.91 ATRIAL FIBRILLATION WITH RVR (HCC): Status: RESOLVED | Noted: 2024-01-30 | Resolved: 2024-02-09

## 2024-02-09 PROBLEM — N17.9 AKI (ACUTE KIDNEY INJURY) (HCC): Status: RESOLVED | Noted: 2024-01-29 | Resolved: 2024-02-09

## 2024-02-09 PROBLEM — R31.9 HEMATURIA: Status: RESOLVED | Noted: 2024-02-04 | Resolved: 2024-02-09

## 2024-02-09 PROBLEM — E87.5 HYPERKALEMIA: Status: RESOLVED | Noted: 2024-01-30 | Resolved: 2024-02-09

## 2024-02-09 PROBLEM — E87.70 HYPERVOLEMIA: Status: RESOLVED | Noted: 2024-02-05 | Resolved: 2024-02-09

## 2024-02-09 PROBLEM — J69.0 ASPIRATION PNEUMONIA (HCC): Status: RESOLVED | Noted: 2024-02-02 | Resolved: 2024-02-09

## 2024-02-09 LAB
GLUCOSE BLD STRIP.AUTO-MCNC: 142 MG/DL (ref 65–100)
GLUCOSE BLD STRIP.AUTO-MCNC: 158 MG/DL (ref 65–100)
GLUCOSE BLD STRIP.AUTO-MCNC: 215 MG/DL (ref 65–100)
SERVICE CMNT-IMP: ABNORMAL

## 2024-02-09 PROCEDURE — 94640 AIRWAY INHALATION TREATMENT: CPT

## 2024-02-09 PROCEDURE — 94760 N-INVAS EAR/PLS OXIMETRY 1: CPT

## 2024-02-09 PROCEDURE — 6370000000 HC RX 637 (ALT 250 FOR IP): Performed by: INTERNAL MEDICINE

## 2024-02-09 PROCEDURE — 82962 GLUCOSE BLOOD TEST: CPT

## 2024-02-09 PROCEDURE — 6370000000 HC RX 637 (ALT 250 FOR IP): Performed by: HOSPITALIST

## 2024-02-09 PROCEDURE — 2580000003 HC RX 258: Performed by: INTERNAL MEDICINE

## 2024-02-09 PROCEDURE — 6360000002 HC RX W HCPCS: Performed by: INTERNAL MEDICINE

## 2024-02-09 PROCEDURE — 2700000000 HC OXYGEN THERAPY PER DAY

## 2024-02-09 RX ORDER — INSULIN ASPART 100 [IU]/ML
INJECTION, SOLUTION INTRAVENOUS; SUBCUTANEOUS
Qty: 5 ADJUSTABLE DOSE PRE-FILLED PEN SYRINGE | Refills: 3 | Status: SHIPPED | OUTPATIENT
Start: 2024-02-09

## 2024-02-09 RX ORDER — BUMETANIDE 1 MG/1
1 TABLET ORAL DAILY
Qty: 30 TABLET | Refills: 3 | Status: SHIPPED | OUTPATIENT
Start: 2024-02-10

## 2024-02-09 RX ORDER — CARVEDILOL 3.12 MG/1
3.12 TABLET ORAL 2 TIMES DAILY WITH MEALS
Qty: 60 TABLET | Refills: 1 | Status: SHIPPED | OUTPATIENT
Start: 2024-02-09

## 2024-02-09 RX ADMIN — DOCUSATE SODIUM 50 MG AND SENNOSIDES 8.6 MG 2 TABLET: 8.6; 5 TABLET, FILM COATED ORAL at 09:49

## 2024-02-09 RX ADMIN — BUDESONIDE INHALATION 500 MCG: 0.5 SUSPENSION RESPIRATORY (INHALATION) at 19:32

## 2024-02-09 RX ADMIN — APIXABAN 2.5 MG: 2.5 TABLET, FILM COATED ORAL at 09:49

## 2024-02-09 RX ADMIN — INSULIN LISPRO 2 UNITS: 100 INJECTION, SOLUTION INTRAVENOUS; SUBCUTANEOUS at 18:05

## 2024-02-09 RX ADMIN — ROSUVASTATIN CALCIUM 10 MG: 20 TABLET, FILM COATED ORAL at 09:49

## 2024-02-09 RX ADMIN — TAMSULOSIN HYDROCHLORIDE 0.4 MG: 0.4 CAPSULE ORAL at 09:49

## 2024-02-09 RX ADMIN — SODIUM CHLORIDE, PRESERVATIVE FREE 10 ML: 5 INJECTION INTRAVENOUS at 09:53

## 2024-02-09 RX ADMIN — CARVEDILOL 3.12 MG: 3.12 TABLET, FILM COATED ORAL at 09:49

## 2024-02-09 RX ADMIN — ASPIRIN 81 MG 81 MG: 81 TABLET ORAL at 09:49

## 2024-02-09 RX ADMIN — IPRATROPIUM BROMIDE AND ALBUTEROL SULFATE 1 DOSE: 2.5; .5 SOLUTION RESPIRATORY (INHALATION) at 08:04

## 2024-02-09 RX ADMIN — ANTI-FUNGAL POWDER MICONAZOLE NITRATE TALC FREE: 1.42 POWDER TOPICAL at 09:51

## 2024-02-09 RX ADMIN — BUMETANIDE 1 MG: 1 TABLET ORAL at 09:49

## 2024-02-09 RX ADMIN — IPRATROPIUM BROMIDE AND ALBUTEROL SULFATE 1 DOSE: 2.5; .5 SOLUTION RESPIRATORY (INHALATION) at 19:32

## 2024-02-09 RX ADMIN — Medication: at 09:51

## 2024-02-09 RX ADMIN — CARVEDILOL 3.12 MG: 3.12 TABLET, FILM COATED ORAL at 18:05

## 2024-02-09 RX ADMIN — BUDESONIDE INHALATION 500 MCG: 0.5 SUSPENSION RESPIRATORY (INHALATION) at 08:04

## 2024-02-09 ASSESSMENT — PAIN SCALES - GENERAL
PAINLEVEL_OUTOF10: 0
PAINLEVEL_OUTOF10: 0

## 2024-02-09 NOTE — PLAN OF CARE
Problem: Discharge Planning  Goal: Discharge to home or other facility with appropriate resources  Outcome: Completed  Flowsheets  Taken 2/9/2024 0718 by Luis Yao RN  Discharge to home or other facility with appropriate resources: Identify barriers to discharge with patient and caregiver  Taken 2/8/2024 2045 by Eri Moseley, RN  Discharge to home or other facility with appropriate resources:   Identify barriers to discharge with patient and caregiver   Arrange for needed discharge resources and transportation as appropriate   Identify discharge learning needs (meds, wound care, etc)   Refer to discharge planning if patient needs post-hospital services based on physician order or complex needs related to functional status, cognitive ability or social support system     Problem: Safety - Adult  Goal: Free from fall injury  Outcome: Completed

## 2024-02-09 NOTE — CARE COORDINATION
Patient will need to go home with Oxygen. CM sent referral to [facility] as requested by patient.     CM to follow up.

## 2024-02-09 NOTE — CARE COORDINATION
Patient has discharge orders in on this day to go home with [facility]. Patient and patient's spouse are both aware and in agreement with this discharge plan. CM also arranged Oxygen with [facility] (confirmed by Aren at [facility]). CM gave patient portable oxygen tank at bedside.     CM arranged transport for 4:30PM. Per [facility] trust, they will try to arrive to hospital by 6:30PM. Interdisciplinary team made aware.     No CM needs voiced or noted at this time.     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)    Cellulitis of leg, left    History of bladder cancer    Atrial fibrillation with rapid ventricular response (HCC)    Class 3 severe obesity with body mass index (BMI) of 40.0 to 44.9 in adult (HCC)    PAD (peripheral artery disease) (HCC)    CAD (coronary artery disease)    Stage 3b chronic kidney disease (HCC)    Permanent atrial fibrillation (HCC)    Leg ulcer (HCC)    Stenosis of left peroneal artery (HCC)    Hypercoagulability due to atrial fibrillation (HCC)    Pleural effusion on right    Pleural effusion on left    History of smoking    Dilated cardiomyopathy (HCC)       Service Assessment  Patient Orientation Alert and Oriented   Cognition Alert   History Provided By Patient, Spouse   Primary Caregiver Self   Accompanied By/Relationship     Support Systems Spouse/Significant Other   Patient's Healthcare Decision Maker is: Legal Next of Kin   PCP Verified by CM Yes (confirmed PCP is Dr. Miguel Angel Tavares)   Last Visit to PCP Within last 3 months (last PCP visit was

## 2024-02-09 NOTE — DISCHARGE SUMMARY
insulin detemir (LEVEMIR FLEXPEN) 100 UNIT/ML injection pen Inject 15 Units into the skin nightly  Qty: 15 mL, Refills: 1    Associated Diagnoses: Uncontrolled type 2 diabetes mellitus with hyperglycemia (HCC)      NOVOLOG FLEXPEN 100 UNIT/ML injection pen Admin Instructions: **Medium Dose Corrective Algorithm**  Glucose: Dose:   No Insulin  200-249 2 Units  250-299 4 Units  300-349 6 Units  Over 349 8 Unit  Qty: 5 Adjustable Dose Pre-filled Pen Syringe, Refills: 3           CONTINUE these medications which have NOT CHANGED    Details   nystatin (MYCOSTATIN) 310318 UNIT/GM powder Apply 3 times daily.  Qty: 60 g, Refills: 2    Associated Diagnoses: Yeast dermatitis      linaCLOtide (LINZESS) 72 MCG CAPS capsule Take 1 capsule by mouth every morning (before breakfast)  Qty: 30 capsule, Refills: 2    Associated Diagnoses: Chronic idiopathic constipation      alfuzosin (UROXATRAL) 10 MG extended release tablet Take 1 tablet by mouth daily  Qty: 30 tablet, Refills: 3    Associated Diagnoses: Urinary retention due to benign prostatic hyperplasia      XARELTO 15 MG TABS tablet Take 1 tablet by mouth daily (with breakfast)  Qty: 42 tablet, Refills: 3      rosuvastatin (CRESTOR) 10 MG tablet Take 1 tablet by mouth daily  Qty: 30 tablet, Refills: 3    Associated Diagnoses: Mixed hyperlipidemia      COMFORT EZ PEN NEEDLES 32G X 4 MM MISC       aspirin (ASPIRIN CHILDRENS) 81 MG chewable tablet Take 1 tablet by mouth daily  Qty: 30 tablet, Refills: 3      Cyanocobalamin 2500 MCG TABS Take by mouth daily      ONETOUCH VERIO strip            STOP taking these medications       dilTIAZem (TIAZAC) 360 MG extended release capsule Comments:   Reason for Stopping:         lisinopril-hydroCHLOROthiazide (PRINZIDE;ZESTORETIC) 20-12.5 MG per tablet Comments:   Reason for Stopping:         digoxin (LANOXIN) 125 MCG tablet Comments:   Reason for Stopping:               Some of the medications may be marked as \"stop taking\" by the

## 2024-02-10 NOTE — PROGRESS NOTES
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]  JUANCARLOS (acute kidney injury) (HCC) [N17.9]  Atrial fibrillation with RVR (HCC) [I48.91]    Assessment:   Principal Problem:    JUANCARLOS (acute kidney injury) (HCC)  Active Problems:    History of bladder cancer    Class 3 severe obesity with body mass index (BMI) of 40.0 to 44.9 in adult (HCC)    PAD (peripheral artery disease) (HCC)    Hyperkalemia    Atrial fibrillation with RVR (HCC)    CAD (coronary artery disease)    Stage 3b chronic kidney disease (HCC)    Permanent atrial fibrillation (HCC)    Leg ulcer (HCC)    Stenosis of left peroneal artery (HCC)    Hypercoagulability due to atrial fibrillation (HCC)    Pleural effusion on right    Pleural effusion on left  Resolved Problems:    * No resolved hospital problems. *      Plan:   Permanent AF with RVR - s/p CRT-P/AVJ ablation. Stable function. Restart OAC today if no further procedures considered.   Large right pleural effusion - s/p thora. Stable, per pulmonary.   Hypotension - hold coreg. Has been on levophed, wean as able.   CKD - stable, following labs.   Leg ulcer - per medicine/wound care.   DMII - SSI.   Dispo - ongoing ICU level care.     Patient is being seen today within a 90-day global period, but is being seen for a separately identifiable E/M service and is not related to the post-operative care of the procedure.     Thank you for allowing me to participate in the electrophysiologic care of this most pleasant patient. Please feel free to contact me if there are any questions or concerns.    Ilan Montana MD, MS  Clinical Cardiac Electrophysiology  [facility] Cardiology    Subjective:   No complaints this AM, no chest pain or shortness of breath    Interval History: (History of pertinent interval events obtained from nursing 
                       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]  JUANCARLOS (acute kidney injury) (HCC) [N17.9]  Atrial fibrillation with RVR (HCC) [I48.91]    Assessment:   Principal Problem:    JUANCARLOS (acute kidney injury) (HCC)  Active Problems:    History of bladder cancer    Class 3 severe obesity with body mass index (BMI) of 40.0 to 44.9 in adult (HCC)    PAD (peripheral artery disease) (HCC)    Hyperkalemia    Atrial fibrillation with RVR (HCC)    CAD (coronary artery disease)    Stage 3b chronic kidney disease (HCC)    Permanent atrial fibrillation (HCC)    Leg ulcer (HCC)    Stenosis of left peroneal artery (HCC)    Hypercoagulability due to atrial fibrillation (HCC)    Pleural effusion on right    Pleural effusion on left    Aspiration pneumonia (HCC)    History of smoking    Hematuria    Hypervolemia  Resolved Problems:    * No resolved hospital problems. *      Plan:   Permanent AF with RVR - s/p CRT-P/AVJ ablation. Stable function. Change OAC to low dose Eliquis and studies demonstrate stable efficacy and increased safety.   Large right pleural effusion - s/p b/l thora. Stable, per pulmonary. Doing better. Continue diuresis.   Hypotension - resolved.    Severe dilated cardiomyopathy/HFrEF, acute on chronic decompensated HF - EF 15-20%, continue GDMT as tolerated. Repeat echo in 3-4 months.   Consider ischemic workup as OP if remains stable.   Continue diuresis as prescribed given stable renal function.   Change to oral bumex today.  Continue coreg and add low dose hydralazine.   CKD - stable, following labs. Continues to improve. Continue diuresis for now. Good UOP.  Leg ulcer - per medicine/wound care.   DMII - SSI.   Abnormal UA - Per medicine.  Dispo - Possible d/c today. Stable for d/c from CV perspective. Ensure device clinic follow up in 1-2 
                       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]  JUANCARLOS (acute kidney injury) (HCC) [N17.9]  Atrial fibrillation with RVR (HCC) [I48.91]      Subjective:   No complaints this AM, no chest pain or shortness of breath    Interval History: (History of pertinent interval events obtained from nursing staff)    ROS:  GEN:  No fever or chills  Cardiovascular:  As noted above  Pulmonary:  As noted above  Neuro:  No new focal motor or sensory loss      Objective:     Vitals:    02/04/24 1930 02/04/24 2243 02/05/24 0236 02/05/24 0600   BP:  114/64 (!) 109/59    Pulse: 80 80 80    Resp: 18 20 18    Temp:  97.3 °F (36.3 °C) 97.9 °F (36.6 °C)    TempSrc:  Oral Temporal    SpO2: 92% 93% 92%    Weight:    124.4 kg (274 lb 4 oz)   Height:           Physical Exam:  General-Well Developed, Well Nourished, No Acute Distress, Alert & Oriented x 3, appropriate mood.  Neck- supple, no JVD  CV- regular rate and rhythm no MRG  Lung- clear bilaterally  Abd- soft, nontender, nondistended  Ext- no edema bilaterally.  Skin- warm and dry    Current Facility-Administered Medications   Medication Dose Route Frequency    lactulose (CHRONULAC) 10 GM/15ML solution 20 g  20 g Oral Daily PRN    hydrALAZINE (APRESOLINE) tablet 10 mg  10 mg Oral 3 times per day    medicated lip ointment (BLISTEX)   Topical PRN    traZODone (DESYREL) tablet 50 mg  50 mg Oral Nightly    apixaban (ELIQUIS) tablet 2.5 mg  2.5 mg Oral BID    bumetanide (BUMEX) injection 0.5 mg  0.5 mg IntraVENous BID    magnesium hydroxide (MILK OF MAGNESIA) 400 MG/5ML suspension 30 mL  30 mL Oral Daily PRN    glucose chewable tablet 16 g  4 tablet Oral PRN    dextrose bolus 10% 125 mL  125 mL IntraVENous PRN    Or    dextrose bolus 10% 250 mL  250 mL IntraVENous PRN    Glucagon Emergency KIT 1 mg  1 mg SubCUTAneous 
                       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]  JUANCARLOS (acute kidney injury) ([facility]) [N17.9]  Atrial fibrillation with RVR ([facility]) [I48.91]      Subjective:   No complaints this AM, no chest pain or shortness of breath, appropriate post op device pocket pain    Interval History: (History of pertinent interval events obtained from nursing staff)  Cardiac device placed yesterday, no events overnight, no immediate complications    ROS:  GEN:  No fever or chills  Cardiovascular:  As noted above  Pulmonary:  As noted above  Neuro:  No new focal motor or sensory loss      Objective:     Vitals:    02/01/24 0500 02/01/24 0530 02/01/24 0600 02/01/24 0630   BP: (!) 99/54 (!) 104/59 (!) 97/59 (!) 96/58   Pulse: 90 90 90 90   Resp: 28 26 27 27   Temp:       TempSrc:       SpO2: 98% 97% 98% 96%   Weight:       Height:           Physical Exam:  General-Well Developed, Well Nourished, No Acute Distress, Alert & Oriented x 3, appropriate mood.  CV- regular rate and rhythm no MRG, left infraclavicular pocket with dressing in place, no evidence of hematoma, redness, warmth or excessive drainage  Lung- clear bilaterally  Abd- soft, nontender, nondistended  Ext- no edema bilaterally.    Current Facility-Administered Medications   Medication Dose Route Frequency    mineral oil-hydrophilic petrolatum (AQUAPHOR) ointment   Topical Daily    sodium chloride flush 0.9 % injection 5-40 mL  5-40 mL IntraVENous 2 times per day    sodium chloride flush 0.9 % injection 5-40 mL  5-40 mL IntraVENous PRN    0.9 % sodium chloride infusion   IntraVENous PRN    polyethylene glycol (GLYCOLAX) packet 17 g  17 g Oral Daily PRN    acetaminophen (TYLENOL) tablet 650 mg  650 mg Oral Q4H PRN    ondansetron (ZOFRAN) injection 4 mg  4 mg IntraVENous Q6H PRN    aluminum & magnesium 
                       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]  JUANCARLOS (acute kidney injury) (HCC) [N17.9]  Atrial fibrillation with RVR (HCC) [I48.91]    Assessment:   Principal Problem:    JUANCARLOS (acute kidney injury) (HCC)  Active Problems:    History of bladder cancer    Class 3 severe obesity with body mass index (BMI) of 40.0 to 44.9 in adult (HCC)    PAD (peripheral artery disease) (HCC)    Hyperkalemia    Atrial fibrillation with RVR (HCC)    CAD (coronary artery disease)    Stage 3b chronic kidney disease (HCC)    Permanent atrial fibrillation (HCC)    Leg ulcer (HCC)    Stenosis of left peroneal artery (HCC)    Hypercoagulability due to atrial fibrillation (HCC)    Pleural effusion on right    Pleural effusion on left    Aspiration pneumonia (HCC)    History of smoking  Resolved Problems:    * No resolved hospital problems. *      Plan:   Permanent AF with RVR - s/p CRT-P/AVJ ablation. Stable function. Change OAC to low dose Eliquis and studies demonstrate stable efficacy and increased safety.   Large right pleural effusion - s/p b/l thora. Stable, per pulmonary. Doing better.   Hypotension - resolved, will restart coreg. Off pressors.    Severe DCM/4 chamber cardiomyopathy/HFrEF - EF 15-20%, continue GDMT as tolerated. Repeat echo in 3-4 months. Consider ischemic workup as OP if remains stable.   CKD - stable, following labs. Improved today.   Leg ulcer - per medicine/wound care.   DMII - SSI.   Dispo - can begin d/c planning. PT/OT recs.     Patient is being seen today within a 90-day global period, but is being seen for a separately identifiable E/M service and is not related to the post-operative care of the procedure.     Thank you for allowing me to participate in the electrophysiologic care of this most pleasant patient. Please feel free to 
              PULMONARY/CRITICAL CARE Progress Note          2/3/2024    Kiran Galvez                        Date of Admission:  1/29/2024    Hospital course:  Kiran Galvez is a 83 y.o.  male seen and evaluated at the request of Dr. Dorantes for right pleural effusion.  He has a PMH of CKD, PAD, pancreatic mass, obesity, bladder cancer, atrial fibrillation, SFA stenosis, CKD who was admitted with AF with RVR.  His echo revealed an EF of 15-20% and CXR reveals R effusion.  He underwent AV node ablation and BiV PM implantation yesterday by Dr. Montana.    Last night his BP was 70/40 and he required IV fluids- his fluid balance is roughly 6L positive this admission.      The patient's chart is reviewed and the patient is discussed with the staff.    Subjective:     No Major overnight events, On RA passed swallowing evaluation        Current Outpatient Medications   Medication Instructions    alfuzosin (UROXATRAL) 10 mg, Oral, DAILY    aspirin (ASPIRIN CHILDRENS) 81 mg, Oral, DAILY    carvedilol (COREG) 25 mg    COMFORT EZ PEN NEEDLES 32G X 4 MM MISC No dose, route, or frequency recorded.    Cyanocobalamin 2500 MCG TABS Oral, DAILY    dilTIAZem (TIAZAC) 360 mg, Oral, DAILY    insulin detemir (LEVEMIR FLEXPEN) 100 UNIT/ML injection pen INJECT 40 UNITS UNDER THE SKIN IN THE MORNING AND 35 UNITS UNDER THE SKIN IN THE EVENING    Levemir FlexTouch 3,000 Units, SubCUTAneous, 2 TIMES DAILY    linaCLOtide (LINZESS) 72 mcg, Oral, DAILY BEFORE BREAKFAST    lisinopril-hydroCHLOROthiazide (PRINZIDE;ZESTORETIC) 20-12.5 MG per tablet 1 tablet, Oral, DAILY    NOVOLOG FLEXPEN 100 UNIT/ML injection pen No dose, route, or frequency recorded.    nystatin (MYCOSTATIN) 114860 UNIT/GM powder Apply 3 times daily.    ONETOUCH VERIO strip No dose, route, or frequency recorded.    rosuvastatin (CRESTOR) 10 mg, Oral, DAILY    Xarelto 15 mg, Oral, DAILY WITH BREAKFAST      Past Medical History:   Diagnosis Date    Atrial fibrillation, 
              PULMONARY/CRITICAL CARE Progress Note          2/4/2024    Kiran Galvez                        Date of Admission:  1/29/2024    Hospital course:  Kiran Galvez is a 83 y.o.  male seen and evaluated at the request of Dr. Dorantes for right pleural effusion.  He has a PMH of CKD, PAD, pancreatic mass, obesity, bladder cancer, atrial fibrillation, SFA stenosis, CKD who was admitted with AF with RVR.  His echo revealed an EF of 15-20% and CXR reveals R effusion.  He underwent AV node ablation and BiV PM implantation 1/31 by Dr. Montana.    Last night his BP was 70/40 and he required IV fluids- his fluid balance is roughly 6L positive this admission.  Underwent thoracentesis on 2/1, 900cc from R, 800cc from L    Subjective:   Denies any SOB or DURAN, has occasional cough. Currently on 2lpm. Does not use O2 at home.     Current Outpatient Medications   Medication Instructions    alfuzosin (UROXATRAL) 10 mg, Oral, DAILY    aspirin (ASPIRIN CHILDRENS) 81 mg, Oral, DAILY    carvedilol (COREG) 25 mg    COMFORT EZ PEN NEEDLES 32G X 4 MM MISC No dose, route, or frequency recorded.    Cyanocobalamin 2500 MCG TABS Oral, DAILY    dilTIAZem (TIAZAC) 360 mg, Oral, DAILY    insulin detemir (LEVEMIR FLEXPEN) 100 UNIT/ML injection pen INJECT 40 UNITS UNDER THE SKIN IN THE MORNING AND 35 UNITS UNDER THE SKIN IN THE EVENING    Levemir FlexTouch 3,000 Units, SubCUTAneous, 2 TIMES DAILY    linaCLOtide (LINZESS) 72 mcg, Oral, DAILY BEFORE BREAKFAST    lisinopril-hydroCHLOROthiazide (PRINZIDE;ZESTORETIC) 20-12.5 MG per tablet 1 tablet, Oral, DAILY    NOVOLOG FLEXPEN 100 UNIT/ML injection pen No dose, route, or frequency recorded.    nystatin (MYCOSTATIN) [id number] UNIT/GM powder Apply 3 times daily.    ONETOUCH VERIO strip No dose, route, or frequency recorded.    rosuvastatin (CRESTOR) 10 mg, Oral, DAILY    Xarelto 15 mg, Oral, DAILY WITH BREAKFAST      Past Medical History:   Diagnosis Date    Atrial fibrillation, 
              PULMONARY/CRITICAL CARE Progress Note          2/8/2024    Kiran Galvez                        Date of Admission:  1/29/2024    Hospital course:  Pt is an 84 yo male with a history of CKD, PAD, pancreatic mass, bladder cancer, Afib, SFA stenosis, and obeisty who presented to the ER on 1/29 with afib RVR. Pt found to have HFrEF with EF 15-20% and CXR revealed R pleural effusion. He underwent AVN ablation and BiV PM implantation on 1/31 by Dr. Montana. Pt had B thoracenteses on 2/1 with 900cc removed from the R and 800cc removed from the L  Subjective:     Pt is on 2 L O2 with sat 100%. Need O2 all times per RT ( see below )    Current Outpatient Medications   Medication Instructions    alfuzosin (UROXATRAL) 10 mg, Oral, DAILY    aspirin (ASPIRIN CHILDRENS) 81 mg, Oral, DAILY    carvedilol (COREG) 25 mg    COMFORT EZ PEN NEEDLES 32G X 4 MM MISC No dose, route, or frequency recorded.    Cyanocobalamin 2500 MCG TABS Oral, DAILY    dilTIAZem (TIAZAC) 360 mg, Oral, DAILY    insulin detemir (LEVEMIR FLEXPEN) 100 UNIT/ML injection pen INJECT 40 UNITS UNDER THE SKIN IN THE MORNING AND 35 UNITS UNDER THE SKIN IN THE EVENING    Levemir FlexTouch 3,000 Units, SubCUTAneous, 2 TIMES DAILY    linaCLOtide (LINZESS) 72 mcg, Oral, DAILY BEFORE BREAKFAST    lisinopril-hydroCHLOROthiazide (PRINZIDE;ZESTORETIC) 20-12.5 MG per tablet 1 tablet, Oral, DAILY    NOVOLOG FLEXPEN 100 UNIT/ML injection pen No dose, route, or frequency recorded.    nystatin (MYCOSTATIN) 608890 UNIT/GM powder Apply 3 times daily.    ONETOUCH VERIO strip No dose, route, or frequency recorded.    rosuvastatin (CRESTOR) 10 mg, Oral, DAILY    Xarelto 15 mg, Oral, DAILY WITH BREAKFAST      Past Medical History:   Diagnosis Date    Atrial fibrillation, chronic (HCC)     Cancer (HCC)     Hyperlipidemia     Hypertension     Type 2 diabetes mellitus without complication (HCC)      Past Surgical History:   Procedure Laterality Date    BLADDER SURGERY      
          A follow up visit was made to the patient. Emotional support, spiritual presence were provided for the patient.      Diomedes Stoddard MDIV, [facility]    
       Hospitalist Progress Note   Admit Date:  2024  4:00 PM   Name:  Kiran Galvez   Age:  83 y.o.  Sex:  male  :  1940   MRN:  [id number]   Room:      Presenting/Chief Complaint: OTHER     Reason(s) for Admission: Occlusion of left peroneal artery ([facility]) [I70.202]  Occlusion of right tibial artery ([facility]) [I70.201]  JUANCARLOS (acute kidney injury) ([facility]) [N17.9]  Atrial fibrillation with RVR ([facility]) [I48.91]     Hospital Course:   Mr. Galvez is an 82 y/o WM with a h/o CKD, PAD, pancreatic mass, obesity, bladder cancer and atrial fib who was admitted to our service on  with JUANCARLOS, afib RVR and a suspected L peroneal artery occlusion. VS were initially normal. BMP showed Cr 2.4 (above his baseline of high 1s to 2), K 5.2. Arterial US showed PAD and no flow in L peroneal artery, Vascular was consulted and did not feel was concerning for acute ischemia. Cardiology was consulted for afib RVR. He underwent AVN ablation with BiV PPM placement on . Pulmonology was consulted for R pleural effusion. Thoracentesis removed 800cc fluid. Cr improved. Heparin drip transitioned to Eliquis. TTE showed LVEF 15%. Urology was consulted given hx bladder mass, had been planned for cysto but previously cancelled due to needing to take care of his wife.    Subjective & 24hr Events:   In bed eating breakfast, on 2L, dark yellow urine in cannister. Breathing has improved. Feels better overall. Paced rhythm on monitor.    Assessment & Plan:   #Atrial fibrillation with RVR   - Resolved. S/p AVN ablation with BiV pacer on .   - Cardiology following.     # R pleural effusion   - S/p thora with 800cc removed.    # HyperK   - Resolved.    # Hematuria   - Appears resolved, yellow urine noted today .   - Seen by Urology, needs cysto at some point. Continues on Eliquis and ASA.     # Chronic sCHF   - Con't carvedilol, hydralazine, oral Bumex    # JUANCARLOS on CKD3b   - Resolved.    # DM2   - SSI. Had hypoglycemia about 5d ago with 
       Hospitalist Progress Note   Admit Date:  2024  4:00 PM   Name:  Kiran Galvez   Age:  83 y.o.  Sex:  male  :  1940   MRN:  [id number]   Room:      Presenting/Chief Complaint: OTHER     Reason(s) for Admission: Occlusion of left peroneal artery ([facility]) [I70.202]  Occlusion of right tibial artery ([facility]) [I70.201]  JUANCARLOS (acute kidney injury) ([facility]) [N17.9]  Atrial fibrillation with RVR ([facility]) [I48.91]     Hospital Course:   Mr. Galvez is an 84 y/o WM with a h/o CKD, PAD, pancreatic mass, obesity, bladder cancer and atrial fib who was admitted to our service on  with JUANCARLOS, afib RVR and a suspected L peroneal artery occlusion. VS were initially normal. BMP showed Cr 2.4 (above his baseline of high 1s to 2), K 5.2. Arterial US showed PAD and no flow in L peroneal artery, Vascular was consulted and did not feel was concerning for acute ischemia. Cardiology was consulted for afib RVR. He underwent AVN ablation with BiV PPM placement on . Pulmonology was consulted for R pleural effusion. Thoracentesis removed 800cc fluid. Cr improved. Heparin drip transitioned to Eliquis. TTE showed LVEF 15%. Urology was consulted given hx bladder mass, had been planned for cysto but previously cancelled due to needing to take care of his wife.    Subjective & 24hr Events:   Up to chair, doing ok, comfortable. Wife wants him to go to [facility] but insurance was not accepted so will try different facilities. Knows if unable to place then home with HH is the plan.    Assessment & Plan:   #Atrial fibrillation with RVR              - Resolved. S/p AVN ablation with BiV pacer on .              - Cardiology following.      # R pleural effusion              - S/p thora with 800cc removed.     # HyperK              - Resolved.     # Hematuria              - Appears resolved, yellow urine noted today .              - Seen by Urology, needs cysto at some point. Continues on Eliquis and ASA.      # Chronic sCHF              
       Hospitalist Progress Note   Admit Date:  2024  4:00 PM   Name:  Kiran Galvez   Age:  83 y.o.  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 male with medical history of CKD, PAD, pancreatic mass, obesity, bladder cancer and atrial fib who was referred to the ER by his PCP due to worsening b/l foot pain. He is a poor historian. VS were initially normal. BMP showed Cr 2.4 (above his baseline of high 1s to 2), K 5.2. Bilateral LE arterial US showed mild distal right SFA stenosis, diffuse reduced peak systolic velocities of the left lower extremity representing changed from  which may suggest worsening inflow disease of the common and external iliac arteries and no detectable flow in the left peroneal artery and no detectable flow in either anterior tibial artery. He is on Xarelto for afib.  In the ER he was noted to be in A-fib RVR so was started on a Cardizem drip.  Upon chart review, it appears that he has been in A-fib RVR for quite a while and his [place] cardiologist recommended a pacemaker with AV dwain ablation but patient declined due to having a sick wife at home.    Subjective & 24hr Events (24):   He states that he is ready to get his pacemaker.  States he wants to go home to his sick wife.  Denies shortness of breath.  Denies orthopnea.  Leg pain much improved.      Assessment & Plan:     Principle Problems:    Atrial fibrillation with RVR (HCC)    Permanent atrial fibrillation (HCC)    Hypercoagulability due to atrial fibrillation (HCC)   Patient in permanent atrial fibrillation  Recent 1 week event monitor showed persistent A-fib RVR with average rate of 143  Continue Cardizem 
       Hospitalist Progress Note   Admit Date:  2024  4:00 PM   Name:  Kiran Galvez   Age:  83 y.o.  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 male with medical history of CKD, PAD, pancreatic mass, obesity, bladder cancer and atrial fib who was referred to the ER by his PCP due to worsening b/l foot pain. He is a poor historian. VS were initially normal. BMP showed Cr 2.4 (above his baseline of high 1s to 2), K 5.2. Bilateral LE arterial US showed mild distal right SFA stenosis, diffuse reduced peak systolic velocities of the left lower extremity representing changed from  which may suggest worsening inflow disease of the common and external iliac arteries and no detectable flow in the left peroneal artery and no detectable flow in either anterior tibial artery. He is on Xarelto for afib.  In the ER he was noted to be in A-fib RVR so was started on a Cardizem drip.  Upon chart review, it appears that he has been in A-fib RVR for quite a while and his [place] cardiologist recommended a pacemaker with AV dwain ablation but patient declined due to having a sick wife at home.    Subjective & 24hr Events (24):     Patient examined at bedside. No acute overnight events. Breathing feels well without issue. No chest pain. No abdominal pain, nausea/vomiting or diarrhea.      Assessment & Plan:       Atrial fibrillation with RVR (HCC)    Permanent atrial fibrillation (HCC) status post pacemaker placement on     Hypercoagulability due to atrial fibrillation (HCC)   - weaned off Cardizem  - would avoid CCB with known severely depressed EF of 15%  - On Xarelto with renal dose  - cardiology 
    PROGRESS NOTE    I was alerted that the patient had pleural effusion on echocardiogram.  He also has LV dysfunction with EF 15% - 20%    CXR revealed large right pleural effusion  Stop IV fluids  Give IV Bumex 1 mg x 1 dose  Strict I's and O's  Consult pulmonology for assistance with pleural effusion  Continue heparin drip => restart OAC once okay with all specialist    Carolann Blankenship, DO    
  Gigi Lubin/[facility] Critical Care Note:: 2/9/2024  Kiran Galvez  Admission Date: 1/29/2024     Length of Stay: 11 days    Background: Pt is an 82 yo male with a history of CKD, PAD, pancreatic mass, bladder cancer, Afib, SFA stenosis, and obeisty who presented to the ER on 1/29 with afib RVR. Pt found to have HFrEF with EF 15-20% and CXR revealed R pleural effusion. He underwent AVN ablation and BiV PM implantation on 1/31 by Dr. [place]. Pt had B thoracenteses on 2/1 with 900cc removed from the R and 800cc removed from the L     Notable PMH:  has a past medical history of Atrial fibrillation, chronic (HCC), Cancer (HCC), Hyperlipidemia, Hypertension, and Type 2 diabetes mellitus without complication (HCC).    24 Hour events:   Remains on 2 l    Review of Systems: Comprehensive ROS negative except in HPI    Lines: (insertion date)    External Urinary Catheter (Active)      Drips: current dose (range)  Dose (mcg/min) Norepinephrine: 0 mcg/min  Dose (units/hr) Heparin: 0 Units/hr  Dose (mg/min) Amiodarone: 1 mg/min     Pertinent Exam:         Blood pressure (!) 106/55, pulse 79, temperature 97.2 °F (36.2 °C), temperature source Oral, resp. rate 20, height 1.753 m (5' 9\"), weight 117.7 kg (259 lb 7.7 oz), SpO2 96 %.   Intake/Output Summary (Last 24 hours) at 2/9/2024 1005  Last data filed at 2/9/2024 0822  Gross per 24 hour   Intake 700 ml   Output 900 ml   Net -200 ml     Constitutional: alert  EENMT:  Sclera clear, pupils equal, oral mucosa moist  Respiratory: basilar crackle  Cardiovascular: rrr  Gastrointestinal:  soft with no tenderness; positive bowel sounds present  Musculoskeletal:  warm with no cyanosis, 2+ edema lower extremity edema  Skin:  no jaundice or ecchymosis  Neurologic: alert  Psychiatric: calm    CXR: none today      Recent Labs     02/07/24  0319   WBC 7.8   HGB 10.5*   HCT 34.3*   *     Recent Labs     02/07/24  0319 02/08/24  0424    139   K 4.2 4.0   * 101*   CO2 40* 
 CONSULT                      Date: 2/4/2024        Patient Name: Kiran Galvez     YOB: 1940      Age:  83 y.o.      History of Present Illness     83 y.o.   male  with medical history of CKD, PAD, pancreatic mass, obesity, bladder cancer and atrial fib who was referred to the ER by his PCP due to worsening b/l foot pain. He is a poor historian. VS were initially normal. BMP showed Cr 2.4 (above his baseline of high 1s to 2), K 5.2. Bilateral LE arterial US showed mild distal right SFA stenosis, diffuse reduced peak systolic velocities of the left lower extremity representing changed from 2023 which may suggest worsening inflow disease of the common and external iliac arteries and no detectable flow in the left peroneal artery and no detectable flow in either anterior tibial artery. He is on Xarelto for afib.  In the ER he was noted to be in A-fib RVR so was started on a Cardizem drip.  Upon chart review, it appears that he has been in A-fib RVR for quite a while and his [place] cardiologist recommended a pacemaker with AV dawin ablation but patient declined due to having a sick wife at home.    Urology consult for hematuria. Patient of Dr. Hidalgo with hx of bladder cancer. He had a surveillance cystoscopy in 10/2023 with lesions suspicious for bladder cancer recurrence and was scheduled for a cysto with bladder bx but cancelled because he had to take care of his wife at home. His urine became slightly more hematuric overnight. He is on eliquis. He is voiding spontaneously with external condom catheter.  CT hematuria in 7/2023 unremarkable.    Past Medical History     Past Medical History:   Diagnosis Date    Atrial fibrillation, chronic (HCC)     Cancer (HCC)     Hyperlipidemia     Hypertension     Type 2 diabetes mellitus without complication (HCC)         Past Surgical History     Past Surgical History:   Procedure Laterality Date    BLADDER SURGERY      CHOLECYSTECTOMY      EP DEVICE 
83 y.o. male with medical history of CKD, PAD, pancreatic mass, obesity, bladder cancer and atrial fib who was referred to the ER by his PCP due to worsening b/l foot pain. In the ER he was noted to be in A-fib RVR so was started on a Cardizem drip.  Patient denies chest pain.  Patient denies feeling of shortness of breath.  Patient has no respiratory distress at this time.  Cardiology evaluated the patient is found to be in persistent A-fib, complete heart block, SSS,    Patient underwent biventricular pacemaker implant and AV node ablation.    General:          Well nourished.    Head:               Normocephalic, atraumatic  Eyes:               Sclerae appear normal.  Pupils equally round.  ENT:                Nares appear normal.  Moist oral mucosa  Neck:               No restricted ROM.  Trachea midline   CV:                  Pacemaker rhythm regular no ectopy or ischemic                          changes no m/r/g.  No jugular venous distension.  Lungs:             CTAB.  Diminished right side base no wheezing, rhonchi, or rales.  Symmetric expansion.  Abdomen:        Soft, nontender, nondistended.  Extremities:     No cyanosis or clubbing.  No edema    CXR: Single AP portable chest x-ray shows right-sided pleural effusion with some opacity in the right lung.  Pacemaker/defibrillator left anterior chest wires appear in good position.  No pneumothorax cardiac silhouette is distorted due to positioning and technique.      EKG: Pacemaker rhythm      ECHO: 1/29/2024  Left Ventricle Severely reduced left ventricular systolic function with a visually estimated EF of 15 - 20%. Left ventricle size is normal. Mildly increased wall thickness. Severe global hypokinesis present. Indeterminate diastolic function.       Principal problem: Persistent A-fib with associated heart block status post pacemaker placement  Plan: Cardiac monitoring and treatment per cardiology  Right pleural effusion  Plan: Patient is a noted 
ACUTE OCCUPATIONAL THERAPY GOALS:   (Developed with and agreed upon by patient and/or caregiver.)  1. Patient will complete lower body bathing and dressing with STAND BY ASSIST and adaptive equipment as needed.     2. Patient will complete toileting with STAND BY ASSIST.  3. Patient will complete grooming ADL standing at sink with STAND BY ASSIST.  4. Patient will tolerate 25 minutes of OT treatment with 1-2 rest breaks to increase activity tolerance for ADLs.   5. Patient will complete functional transfers with STAND BY ASSIST and adaptive equipment as needed.   6. Patient will tolerate 10 minutes BUE exercises to increase strength for safe, functional transfers.      Timeframe: 7 visits     OCCUPATIONAL THERAPY: Daily Note AM   OT Visit Days: 4   Time In/Out  OT Charge Capture  Rehab Caseload Tracker  OT Orders    Pacemaker precautions    Kiran Galvez is a 83 y.o. 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 Galvez is doing fair today. Pt presents sitting up in chair upon arrival. Pt was able to stand with min A x2 and performed functional mobility in room/hallway with CGA. Pt did well with mobility. No seated rest breaks required. Pt returned to room and was assisted with LB bathing and dressing due to getting urine on his socks. Pt left in chair with all needs in reach. Making some progress with goals. Will continue to benefit from 
ACUTE OCCUPATIONAL THERAPY GOALS:   (Developed with and agreed upon by patient and/or caregiver.)  1. Patient will complete lower body bathing and dressing with STAND BY ASSIST and adaptive equipment as needed.     2. Patient will complete toileting with STAND BY ASSIST.  3. Patient will complete grooming ADL standing at sink with STAND BY ASSIST.  4. Patient will tolerate 25 minutes of OT treatment with 1-2 rest breaks to increase activity tolerance for ADLs.   5. Patient will complete functional transfers with STAND BY ASSIST and adaptive equipment as needed.   6. Patient will tolerate 10 minutes BUE exercises to increase strength for safe, functional transfers.     Timeframe: 7 visits     OCCUPATIONAL THERAPY Initial Assessment and Daily Note       OT Visit Days: 1  Acknowledge Orders  Time  OT Charge Capture  Rehab Caseload Tracker      Kiran Galvez is a 83 y.o. 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]  JUANCARLOS (acute kidney injury) ([facility]) [N17.9]  Atrial fibrillation with RVR ([facility]) [I48.91]       Reason for Referral: Generalized Muscle Weakness (M62.81)  Difficulty in walking, Not elsewhere classified (R26.2)  Other abnormalities of gait and mobility (R26.89)  Inpatient: Payor: [facility] MEDICARE / Plan: [facility] OUT OF STATE MANAGED MCR / Product Type: *No Product type* /     ASSESSMENT:     REHAB RECOMMENDATIONS:   Recommendation to date pending progress:  Setting:  Short-term Rehab    Equipment:    To Be Determined--pt has RW and SPC at home     ASSESSMENT:  Mr. Galvez is an 84 y/o male presents with BLE cellulitis, hx peripheral arterial disease. Pt with PMH bladder cancer with new pancreatic mass. At baseline pt lives with his wife, is independent all ADLs and uses SPC for ambulation. Pt does not wear home O2 and is currently requiring 2L O2 NC. Today pt presents with decreased activity tolerance, 
ACUTE PHYSICAL THERAPY GOALS:   (Developed with and agreed upon by patient and/or caregiver.)   Mr. Galvez will perform supine to sit and sit to supine independently in 7 days.    Mr. Galvez will perform sit to stand and bed to chair with least restrictive device independently 7 days.    Mr. Galvez will perform gait with least restrictive device 150 ft independently in 7 days.    Mr. Galvez will go up and down 3 steps with rail independently in 7 days.    Mr. Galvez will perform therex x 25 reps in sitting in 7 days.       PHYSICAL THERAPY Initial Assessment, Daily Note, and AM  (Link to Caseload Tracking: PT Visit Days : 1  Acknowledge Orders  Time In/Out  PT Charge Capture  Rehab Caseload Tracker    Kiran Galvez is a 83 y.o. 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 Galvez presents heard of hearing and on 2 liters oxygen.  He had an external catheter but it wasn't working and his bed was soaked.  At baseline he lives with his wife and says he \"does everything for her\".  However everything is relative.  She drives and walks and doesn't use an assistive device and showers herself.  When trying to ask exactly what he does for her he states whatever she needs but no more clarity than that.  He wasn't using an assistive device.  Today supine to sit with min to mod assist.  Good sitting balance.  Sit to stand with min assist 
ACUTE PHYSICAL THERAPY GOALS:   (Developed with and agreed upon by patient and/or caregiver.)   Mr. Galvez will perform supine to sit and sit to supine independently in 7 days.    Mr. Galvez will perform sit to stand and bed to chair with least restrictive device independently 7 days.    Mr. Galvez will perform gait with least restrictive device 150 ft independently in 7 days.    Mr. Galvez will go up and down 3 steps with rail independently in 7 days.    Mr. Galvez will perform therex x 25 reps in sitting in 7 days.     PHYSICAL THERAPY: Daily Note AM   (Link to Caseload Tracking: PT Visit Days : 2  Time In/Out PT Charge Capture  Rehab Caseload Tracker  Orders    Kiran Galvez is a 83 y.o. 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 Galvez is in chair on arrival, seen in CVICU. Pt is s/p AV ablation and PM placement, in sling. Pt with thoracentesis this am, now on 4 L O2, SPO2 >90% at rest. Pt is [place], does not have hearing aids in currently; just hear ok with louder voice. Pt the min/mod A x 2 for sit to stand transfer from recliner, mod/max a x 2 from lower chair surface. Pt ambulated with upright walker, wide stance, steppage gait; slow overall mohan. Pt unsteady overall, required min A x 2 for safety with mobility, L knee buckling once with activity. Pt overall generally deconditioned and weak; fatigues easily. Pt making slow progress 
ACUTE PHYSICAL THERAPY GOALS:   (Developed with and agreed upon by patient and/or caregiver.)   Mr. Galvez will perform supine to sit and sit to supine independently in 7 days.    Mr. Galvez will perform sit to stand and bed to chair with least restrictive device independently 7 days.    Mr. Galvez will perform gait with least restrictive device 150 ft independently in 7 days.    Mr. Galvez will go up and down 3 steps with rail independently in 7 days.    Mr. Galvez will perform therex x 25 reps in sitting in 7 days.     PHYSICAL THERAPY: Daily Note AM   (Link to Caseload Tracking: PT Visit Days : 6  Time In/Out PT Charge Capture  Rehab Caseload Tracker  Orders  L UE PM Precautions  Kiran Galvez is a 83 y.o. 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 Galvez is sitting in the recliner and agreeable to therapy.  RT present  Sit to stand with min assist however the patient is pulling up using the grab bars which this writer cautioned him about  \"states that he is not doing much pulling\"  Once up the patient states that he needs to use the bathroom, he is assisted into the bathroom with hand held assist and use of the grab bars, needs assistance for a more controlled sit.  Patient requesting to \"sit for awhile\" and then asked for his cell phone.   Pt overall generally deconditioned and weak; fatigues easily.    Good session and  making slow 
ACUTE PHYSICAL THERAPY GOALS:   (Developed with and agreed upon by patient and/or caregiver.)   Mr. Galvez will perform supine to sit and sit to supine independently in 7 days.    Mr. Galvez will perform sit to stand and bed to chair with least restrictive device independently 7 days.    Mr. Galvez will perform gait with least restrictive device 150 ft independently in 7 days.    Mr. Galvez will go up and down 3 steps with rail independently in 7 days.    Mr. Galvez will perform therex x 25 reps in sitting in 7 days.     PHYSICAL THERAPY: Daily Note PM   (Link to Caseload Tracking: PT Visit Days : 3  Time In/Out PT Charge Capture  Rehab Caseload Tracker  Orders  L UE PM Precautions  Kiran Galvez is a 83 y.o. 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 Galvez is in bed on arrival. Pt is s/p AV ablation and PM placement, in sling as a reminder.  Pt  on 4 L O2, SPO2 >90% at rest. Pt is [place]. Pt given mod to max A x 2 for supine to sit.  He then stood to RW with mod a x 2 and max cueing for hand placement and not to use UE.  He was able to ambulate 100' with RW and CGAx2 with chair in tow.   Pt unsteady overall, L knee buckling once with activity. Pt overall generally deconditioned and weak; fatigues easily. Pt making slow progress toward goals...      SUBJECTIVE:   Mr. Galvez states, \"Nope\"     Social/Functional Lives With: Spouse  Type of 
ACUTE PHYSICAL THERAPY GOALS:   (Developed with and agreed upon by patient and/or caregiver.)   Mr. Galvez will perform supine to sit and sit to supine independently in 7 days.    Mr. Galvez will perform sit to stand and bed to chair with least restrictive device independently 7 days.    Mr. Galvez will perform gait with least restrictive device 150 ft independently in 7 days.    Mr. Galvez will go up and down 3 steps with rail independently in 7 days.    Mr. Galvez will perform therex x 25 reps in sitting in 7 days.     PHYSICAL THERAPY: Daily Note PM   (Link to Caseload Tracking: PT Visit Days : 5  Time In/Out PT Charge Capture  Rehab Caseload Tracker  Orders  L UE PM Precautions  Kiran Galvez is a 83 y.o. male   PRIMARY DIAGNOSIS: JUANCARLOS (acute kidney injury) (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) [N17.9]  Atrial fibrillation with RVR ([facility]) [I48.91]  Procedure(s) (LRB):  Insert PPM BiV (N/A)  Lv lead placement (N/A)  Ablation AV node (N/A)  7 Days Post-Op  Inpatient: Payor: [facility] MEDICARE / Plan: [place] [employer] [place] / Product Type: *No Product type* /     ASSESSMENT:     REHAB RECOMMENDATIONS:   Recommendation to date pending progress:  Setting:  Short-term Rehab    Equipment:    To Be Determined     ASSESSMENT:  Mr. Galvez is sitting in the recliner and agreeable to therapy.  OT present.  Pt  on O2 and is [place]. BP  taken in sitting and standing.   Sit to stand with min assist x 2 to the walker.  Standing balance good.  Gait training with rolling walker x 150 feet with rolling walker with slow mohan and cues for safe management of the walker, he also needed several standing rest breaks and additional time to complete task.  Patient is returned to the room and left in the recliner with needs with in reach and alarm intact.  Worked well with OT.   Pt overall generally deconditioned and weak; fatigues easily 
Comprehensive Nutrition Assessment    Type and Reason for Visit: Initial, RD Nutrition Re-Screen/LOS  Length of Stay    Nutrition Recommendations/Plan:   Meals and Snacks:  Diet: Continue current order  Nutrition Supplement Therapy:  Medical food supplement therapy:  None  declined       Malnutrition Assessment:  Malnutrition Status: At risk for malnutrition (Comment) (decreased appetite and po intake during admission)  NFPE: No visible fat or muscle loss    Nutrition Assessment:  Nutrition History: Denies any change in appetite or intake PTA        Weight History: Stated #. Denies any recent weight loss.  Per EMR weigh range 242 to 260#-no trend of weight loss ir weight gain  Nutrition Background:       PMH/PSH:CKD, PAD, pancreatic mass, obesity, bladder cancer,atrial fib, PAD. DM.   Referred to the ER by his PCP due to worsening b/l foot pain.   Findings of a fib RVR, severe cardiomyopathy, JUANCARLOS/hyperkalemia, large pleural effusion, acute hypoxic respiratory failure  1/31:  S/P PPM, 2/1: 900 ml from right and 800 ml from left-thoracentesis, 2/2 had episode of vomiting and ? aspiration  2/2: SLP eval-regular  Nutrition Interval:  Pt seen sitting in chair. Dinner tray arrived and pt started to eat. He says he is going home tomorrow. He reports he's not eating as much here because he's not doing anything. Declines offer of ONS though says he did make a meal of them on a few occasions.      Current Nutrition Therapies:  ADULT DIET; Regular; 4 carb choices (60 gm/meal); Low Fat/Low Chol/High Fiber/2 gm Na    Current Intake:   Average Meal Intake: 51-75% (For 23 recorded meals)        Anthropometric Measures:  Height: 175.3 cm (5' 9\")  Current Body Wt: 112 kg (246 lb 14.6 oz) (2/8), Weight source: Standing Scale  BMI: 36.4, Obese Class 2 (BMI 35.0 -39.9)  Admission Body Weight: 129.1 kg (284 lb 9.8 oz) (2/1 bed scale)  Ideal Body Weight (Kg) (Calculated): 73 kg (160 lbs),    BMI Category Obese Class 2 (BMI 35.0 
Initial visit was made, emotional support and a spiritual presence were provided.   Diomedes Stoddard MDIV, [facility]     
LINA Phillips aware that urine specimen not obtained due to multiple incontinence episodes. Pt aware of need for urine specimen.  
[employer] arrived to  patient. Patients wife called to notify that patient was on his way. Paperwork given to both patient and to [employer]. [employer] instructed to assist patient inside and help him hook up to his new oxygen concentrator, as well as patient instructed to wear oxygen at all times, as he keeps trying to remove it when he's ambulating. All patient belongings sent home with patient.   
Oxygen Qualifier       Room air: SpO2 with O2 and liter flow   Resting SpO2  88%  98% on 2L   Ambulating SpO2  85% 92% on 2L       Completed by:   AVINASH Spain  
PT was in chair. CH introduced self. CH checked for unmet needs and offered support.    Rev. Pepper Salgado M.Div.    
Patient's urine tonight noted to be red, which is a change from the patient's previously yellow/trev urine. Pt reports no pain with urination. MD notified of these findings. UA completed and MD aware of results, and ordered CBC in the AM, and says if this gets worse, urology may need to see patient. Plan of care ongoing.   
Spiritual Care Visit, initial visit.    Visited with patient at bedside.    Prayed for patient's healing and health.    Visit by Casey Brewer, Staff . Ramos., Candi.GUS., B.A.  
Spoke with Dr. Vasquez and orders received to do RN bedside swallow with patient. Ok to give po meds if patient passes. Orders received to order pericolace for patient and ok to transfer patient out of cvicu.  
Spoke with [facility] ambulance service, transport for the patient has bed delayed until appx 930pm tonight. Wife notified of transportation delay.  
TRANSFER - OUT REPORT:    Verbal report given to LINA Mcnamara on Kiran Galvez  being transferred to 2232 for routine progression of patient care       Report consisted of patient’s Situation, Background, Assessment and Recommendations(SBAR).     Information from the following report(s) SBAR and MAR was reviewed with the receiving nurse.    Opportunity for questions and clarification was provided.        
UA not obtained thus far as patient having incontinent episodes. Attempted external catheter x2 without success.  
Upon entering room, patient noted to be coughing, spitting out food. Concerns for aspiration. Dr. Amor also at bedside at this time. Orders given to give 125ml of additional dextrose 10% bolus. MD to place additional orders.  
Vascular Surgery Note    Patient with chronic peripheral arterial disease. No evidence of acute ischemia. Bilateral PT Doppler signals present. Motor and sensation intact. Small wound plantar aspect of foot from dry skin. No evidence of infection. 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 Can d/c heparin gtt. Okay to f/u in clinic in 1-2 weeks from vascular standpoint.   
    Social/Functional Lives With: Spouse  Type of Home: House  Home Layout: One level  Home Access: Stairs to enter without rails  Entrance Stairs - Number of Steps: 1  Bathroom Shower/Tub: Walk-in shower, Shower chair with back  Bathroom Equipment: Shower chair  Bathroom Accessibility: Accessible  Home Equipment: Cane, Walker, standard  Receives Help From: Family  ADL Assistance: Independent  Homemaking Assistance: Independent  Homemaking Responsibilities: Yes  Ambulation Assistance: Independent  Transfer Assistance: Independent  Active : No  Patient's  Info: patient's family  Occupation: Retired  OBJECTIVE:     PAIN: VITALS / O2: PRECAUTION / LINES / DRAINS:   Pre Treatment:    0/10      Post Treatment: 0/10 Vitals        Oxygen   4 L O2 Continuous Pulse Oximetry, Hdez Catheter, and Telemetry     RESTRICTIONS/PRECAUTIONS:  Restrictions/Precautions  Restrictions/Precautions: Fall Risk  Required Braces or Orthoses?: No  Implants present? :  (N/A)  Restrictions/Precautions: Fall Risk  Required Braces or Orthoses?: No  Implants present? :  (N/A)     MOBILITY: I Mod I S SBA CGA Min Mod Max Total  NT x2 Comments:   Bed Mobility    Rolling [] [] [] [] [] [] [] [] [] [] []    Supine to Sit [] [] [] [] [] [] [x] [] [] [] []    Scooting [] [] [] [] [] [] [x] [] [] [] []    Sit to Supine [] [] [] [] [] [] [] [] [] [x] []    Transfers    Sit to Stand [] [] [] [] [] [x] [] [] [] [] [x]    Bed to Chair [] [] [] [] [] [] [] [] [] [x] []    Stand to Sit [] [] [] [] [] [x] [] [] [] [] [x]     [] [] [] [] [] [] [] [] [] [] []    I=Independent, Mod I=Modified Independent, S=Supervision, SBA=Standby Assistance, CGA=Contact Guard Assistance,   Min=Minimal Assistance, Mod=Moderate Assistance, Max=Maximal Assistance, Total=Total Assistance, NT=Not Tested    BALANCE: Good Fair+ Fair Fair- Poor NT Comments   Sitting Static [x] [] [] [] [] []    Sitting Dynamic [] [x] [x] [] [] []              Standing Static [] [] [] [x] [] 
completing self-grooming tasks, including washing face and brushing teeth, sitting EOS, with CGA. Overall tolerated well. Left sitting upright in chair with needs met. Good progress towards goals. Will continue OT efforts as indicated.        SUBJECTIVE:     Mr. Galvez states, \"I guess if I have to.\"     Social/Functional Lives With: Spouse  Type of Home: House  Home Layout: One level  Home Access: Stairs to enter without rails  Entrance Stairs - Number of Steps: 1  Bathroom Shower/Tub: Walk-in shower, Shower chair with back  Bathroom Equipment: Shower chair  Bathroom Accessibility: Accessible  Home Equipment: Cane, Walker, standard  Receives Help From: Family  ADL Assistance: Independent  Homemaking Assistance: Independent  Homemaking Responsibilities: Yes  Ambulation Assistance: Independent  Transfer Assistance: Independent  Active : No  Patient's  Info: patient's family  Occupation: Retired    OBJECTIVE:     LINES / DRAINS / AIRWAY: Hdez Catheter    RESTRICTIONS/PRECAUTIONS:  Restrictions/Precautions  Restrictions/Precautions: Fall Risk  Required Braces or Orthoses?: Yes  Implants present? :  (sling for pacemaker)        PAIN: VITALS / O2:   Pre Treatment:      0/10      Post Treatment: 0/10 Vitals          Oxygen   4L 02     MOBILITY: I Mod I S SBA CGA Min Mod Max Total  NT x2 Comments:   Bed Mobility    Rolling [] [] [] [] [] [] [] [] [] [] [] Already OOB to chair   Supine to Sit [] [] [] [] [] [] [] [] [] [] []    Scooting [] [] [] [] [] [] [] [] [] [] []    Sit to Supine [] [] [] [] [] [] [] [] [] [] []    Transfers    Sit to Stand [] [] [] [] [] [x] [x] [] [] [] [x]    Bed to Chair [] [] [] [] [] [x] [x] [] [] [] [x] X CW   Stand to Sit [] [] [] [] [] [x] [x] [] [] [] [x]    Tub/Shower [] [] [] [] [] [] [] [] [] [] []     Toilet [] [] [] [] [] [] [] [] [] [] []      [] [] [] [] [] [] [] [] [] [] []    I=Independent, Mod I=Modified Independent, S=Supervision/Setup, SBA=Standby Assistance, 
meal this morning. RN: \"Upon entering room, patient noted to be coughing, spitting out food. Concerns for aspiration. Dr. Amor also at bedside at this time.\"    History of Present Injury/Illness: Mr. Galvez  has a past medical history of Atrial fibrillation, chronic (HCC), Cancer (HCC), Hyperlipidemia, Hypertension, and Type 2 diabetes mellitus without complication (HCC).. He also  has a past surgical history that includes Cholecystectomy; Tonsillectomy; Bladder surgery; Skin cancer excision; ep device procedure (N/A, 1/31/2024); ep device procedure (N/A, 1/31/2024); and ep device procedure (N/A, 1/31/2024).  Precautions/Allergies: Patient has no known allergies.     Observations:  Alertness: Alert  Voice: WFL  Speech: Intelligible  Expressive Language: Fluent and Able to communicate wants and needs  Receptive Language: Answers yes/no questions, Follows basic commands, and Appropriately responds to questions  Cognition: Able to recall recent events and medical history and Appropriately attends to clinician    Prior Dysphagia History: none  Prior Instrumental Assessment: none    Current Diet:  Regular Consistency  Thin Liquids    Respiratory Status: Nasal Cannula    Pain:       OBJECTIVE     Oral Motor Assessment: Labial: no impairment  Lingual: no impairment  Dentition: natural, Intact, and poor  Oral Hygiene: adequate, clean, and moist  Vocal quality: WFL  Volitional cough: present and strong  Volitional swallow: present and strong  Oropharyngeal Assessment: Patient presented with trials of thin liquids, pureed solids, soft fruit cup and saltine crackers with and without peanut butter .  Appropriate oral prep with all textures. Timely swallow initiation, and single swallows upon palpation. Adequate oral clearing. No overt signs or symptoms of airway compromise observed with liquid or solid textures. Educated on oral clearance following meal to facilitate clearance of any potential residue. Verbalized and 
seen for a separately identifiable E/M service and is not related to the post-operative care of the procedure.     Thank you for allowing me to participate in the electrophysiologic care of this most pleasant patient. Please feel free to contact me if there are any questions or concerns.    Ilan Montana MD, MS  Clinical Cardiac Electrophysiology  [facility]    Subjective:   No complaints this AM, no chest pain or shortness of breath    Interval History: (History of pertinent interval events obtained from nursing staff)    ROS:  GEN:  No fever or chills  Cardiovascular:  As noted above  Pulmonary:  As noted above  Neuro:  No new focal motor or sensory loss      Objective:     Vitals:    02/04/24 0234 02/04/24 0541 02/04/24 0723 02/04/24 0748   BP: (!) 112/58  123/63    Pulse: 80  80 80   Resp: 18  22 16   Temp: 97.7 °F (36.5 °C)  99.3 °F (37.4 °C)    TempSrc: Temporal  Oral    SpO2: 93%  94% 95%   Weight:  125.3 kg (276 lb 3.8 oz)     Height:           Physical Exam:  General-Well Developed, Well Nourished, No Acute Distress, Alert & Oriented x 3, appropriate mood.  Neck- supple, no JVD  CV- regular rate and rhythm no MRG, left sided CIED C/D/I.   Lung- clear bilaterally  Abd- soft, nontender, nondistended  Ext- no edema bilaterally.  Skin- warm and dry    Current Facility-Administered Medications   Medication Dose Route Frequency    lactulose (CHRONULAC) 10 GM/15ML solution 20 g  20 g Oral Daily PRN    traZODone (DESYREL) tablet 50 mg  50 mg Oral Nightly    apixaban (ELIQUIS) tablet 2.5 mg  2.5 mg Oral BID    bumetanide (BUMEX) injection 0.5 mg  0.5 mg IntraVENous BID    magnesium hydroxide (MILK OF MAGNESIA) 400 MG/5ML suspension 30 mL  30 mL Oral Daily PRN    glucose chewable tablet 16 g  4 tablet Oral PRN    dextrose bolus 10% 125 mL  125 mL IntraVENous PRN    Or    dextrose bolus 10% 250 mL  250 mL IntraVENous PRN    Glucagon Emergency KIT 1 mg  1 mg SubCUTAneous PRN    dextrose 10 % infusion   
side steps to w/c in preparation for being transferred to -step down. Slowly progress towards goals. Will continue OT efforts as indicated.        SUBJECTIVE:     Mr. Galvez states, \"I still haven't gone.    Social/Functional Lives With: Spouse  Type of Home: House  Home Layout: One level  Home Access: Stairs to enter without rails  Entrance Stairs - Number of Steps: 1  Bathroom Shower/Tub: Walk-in shower, Shower chair with back  Bathroom Equipment: Shower chair  Bathroom Accessibility: Accessible  Home Equipment: Cane, Walker, standard  Receives Help From: Family  ADL Assistance: Independent  Homemaking Assistance: Independent  Homemaking Responsibilities: Yes  Ambulation Assistance: Independent  Transfer Assistance: Independent  Active : No  Patient's  Info: patient's family  Occupation: Retired    OBJECTIVE:     LINES / DRAINS / AIRWAY: IV    RESTRICTIONS/PRECAUTIONS:  Restrictions/Precautions  Restrictions/Precautions: Fall Risk  Required Braces or Orthoses?: Yes  Implants present? :  (sling for pacemaker)        PAIN: VITALS / O2:   Pre Treatment:      0/10      Post Treatment: 0/10 Vitals          Oxygen   4L 02     MOBILITY: I Mod I S SBA CGA Min Mod Max Total  NT x2 Comments:   Bed Mobility    Rolling [] [] [] [] [] [] [] [] [] [] []    Supine to Sit [] [] [] [] [] [] [] [] [] [] []    Scooting [] [] [] [] [] [] [] [] [] [] []    Sit to Supine [] [] [] [] [] [] [] [] [] [] []    Transfers    Sit to Stand [] [] [] [] [] [x] [] [] [] [] [x]    Bed to Chair [] [] [] [] [] [x] [] [] [] [] [x]    Stand to Sit [] [] [] [] [] [x] [] [] [] [] [x]    Tub/Shower [] [] [] [] [] [] [] [] [] [] []     Toilet [] [] [] [] [] [x] [] [] [] [] [x]      [] [] [] [] [] [] [] [] [] [] []    I=Independent, Mod I=Modified Independent, S=Supervision/Setup, SBA=Standby Assistance, CGA=Contact Guard Assistance, Min=Minimal Assistance, Mod=Moderate Assistance, Max=Maximal Assistance, Total=Total Assistance, NT=Not 
Rapid [[id number]] Collected: 01/30/24 0006    Order Status: Completed Specimen: Nasopharyngeal Updated: 01/30/24 0041     Source NASAL        SARS-CoV-2, Rapid Not detected        Comment:    The specimen is NEGATIVE for SARS-CoV-2, the novel coronavirus associated with COVID-19.  A negative result does not rule out COVID-19.     This test has been authorized by the FDA under an Emergency Use Authorization (EUA) for use by authorized laboratories.      Fact sheet for Healthcare Providers: https://www.fda.gov/media/132688/download  Fact sheet for Patients: https://www.fda.gov/media/571395/download     Methodology: Isothermal Nucleic Acid Amplification               Ventilator Settings Ideal body weight: 70.7 kg (155 lb 13.8 oz)  Adjusted ideal body weight: 92.2 kg (203 lb 3.5 oz)  Mode FIO2 Rate Tidal Volume Pressure                           Peak airway pressure:     Minute ventilation:    ABG:No results for input(s): \"PHAPOC\", \"BWS0BDXX\", \"FS5YWCU\", \"BEZ7THW\", \"BE\" in the last 72 hours.  Assessment and Plan:  (Medical Decision Making)   Impression: 84yo  male with PMH of CKD, Pancreatic mass and bladder cancer, admitted with Afib RVR and s/p AV node ablation and BiV PPM placement. Underwent bilateral thoracentesis on 2/1.     Assessment and plan:     ?Aspiration - SLP evaluation was normal no evidence of aspiration, now off abx     History of smoking ?COPD -- add nebs. Can follow up as OP with Pulmonary for further evaluation.     Hypoglycemia - on a diet , BG stable     Bilateral pleural effusion: post b/l taps  with R 900 and L 800 from 2/1. - follow up cxr     HFrEF:  medical regimen per cardiology.  Having edema, on bumex     AFIB: per cardiology s/p AV node ablation and BiV PPM     CKD:  Cr 1.70. stable     Hypoxia: currently on 2lpm, wean as tolerated. Does have RAMESH and CPAP at home however he states that he has frequent nocturia so hasn't been using. Recommend he follow up in sleep center.    
138 24 (!) 89/58 96 %   01/30/24 0345 -- (!) 134 21 -- 98 %   01/30/24 0330 -- (!) 126 20 96/71 96 %   01/30/24 0325 -- (!) 133 19 -- 97 %   01/30/24 0315 -- (!) 133 20 (!) 89/66 96 %   01/30/24 0310 -- (!) 131 20 -- 97 %   01/30/24 0300 98.1 °F (36.7 °C) (!) 128 (!) 33 97/73 96 %   01/30/24 0255 -- (!) 136 (!) 31 -- 96 %   01/30/24 0245 -- (!) 127 26 109/62 94 %   01/30/24 0240 -- (!) 133 26 (!) 100/58 --   01/30/24 0218 -- (!) 118 21 -- 93 %   01/30/24 0216 -- (!) 113 24 -- 93 %   01/30/24 0215 -- (!) 113 26 (!) 97/46 93 %   01/30/24 0213 -- 96 21 -- 92 %   01/30/24 0145 -- -- 23 114/89 --   01/30/24 0144 -- (!) 110 30 -- --   01/30/24 0130 -- (!) 105 27 103/76 --   01/30/24 0115 -- (!) 132 27 104/78 --   01/30/24 0114 -- (!) 126 26 -- --   01/30/24 0103 -- (!) 128 24 -- --   01/30/24 0100 -- (!) 130 28 111/81 96 %   01/30/24 0053 -- (!) 119 25 108/81 92 %   01/30/24 0045 -- (!) 140 25 95/71 95 %   01/30/24 0040 -- (!) 124 25 103/80 94 %   01/30/24 0033 -- (!) 137 21 -- (!) 81 %   01/30/24 0030 -- (!) 133 23 106/80 94 %   01/30/24 0027 -- (!) 132 24 107/86 92 %   01/30/24 0008 -- (!) 149 24 113/85 --   01/30/24 0007 -- (!) 139 24 -- --   01/30/24 0002 -- (!) 144 14 -- --   01/29/24 2346 -- -- -- (!) 137/118 --   01/29/24 2330 -- -- -- 106/89 --   01/29/24 2315 -- -- -- 113/84 --   01/29/24 2301 -- -- -- 103/80 --   01/29/24 2215 -- -- -- 115/88 --   01/29/24 2115 -- -- -- (!) 108/90 --   01/29/24 2100 -- -- -- 119/87 --   01/29/24 2045 -- -- -- 114/74 --   01/29/24 2030 -- -- -- 111/76 --   01/29/24 2015 -- -- -- 113/88 --   01/29/24 2000 -- -- -- (!) 125/99 --   01/29/24 1948 97.6 °F (36.4 °C) -- 22 -- 98 %       Oxygen Therapy  SpO2: 99 %  Pulse Oximetry Type: Continuous  Pulse via Oximetry: 126 beats per minute  Pulse Oximeter Device Mode: Continuous  Pulse Oximeter Device Location: Left, Finger  O2 Device: Nasal cannula  Skin Protection for O2 Device: N/A  O2 Flow Rate (L/min): 4 L/min    Estimated body mass 
Trace regurgitation.    Mitral Valve: Mildly thickened leaflet, at the anterior and posterior leaflets. Mild regurgitation.    Tricuspid Valve: Trace regurgitation. Unable to assess RVSP due to inadequate or insignificant tricuspid regurgitation.    Left Atrium: Left atrium is moderately dilated. LA Vol Index is  45 ml/m2.    Right Atrium: Right atrium is severely dilated.    Pericardium: No pericardial effusion.    Extracardiac: Large left pleural effusion.    IVC/SVC: IVC diameter is greater than 21 mm and decreases less than 50% during inspiration; therefore the estimated right atrial pressure is elevated (~15 mmHg). IVC is mildly dilated.    Image quality is technically difficult. Contrast used: Definity.    Signed by: Dorian Archer MD on 1/31/2024 10:56 AM    MICRO: No results for input(s): \"CULTURE\" in the last 72 hours.    Thoracenteses  Date:   LEFT RIGHT  DESCRIPTION   2/1 800mL 900mL Nelia appearing fluid, no fluid sent for analysis           Pleural Fluid Only Analysis (PFO3)  If any one of the 3 are positive, the pleural fluid is considered an exudate:  []  Pleural fluid protein greater than 3.0 g/dL (30 g/L)  []  Pleural fluid cholesterol greater than 55 mg/dL (1.424 mmol/L)  []  Pleural fluid LDH greater than 0.67 times the upper limit of the laboratory's normal serum LDH of 190 (=127).      Assessment and Plan:  (Medical Decision Making)   Impression: 82yo  male with PMH of CKD, Pancreatic mass and bladder cancer, admitted with Afib RVR and s/p AV node ablation and BiV PPM placement. Underwent bilateral thoracentesis on 2/1.    Assessment and plan:  ?Aspiration - SLP evaluation was normal no evidence of aspiration, now off abx    History of smoking ?COPD -- continue nebs. Can follow up as OP with Pulmonary for further evaluation.    Hypoglycemia - on a diet , BG stable    Bilateral pleural effusion: post b/l taps  with R 900 and L 800 from 2/1. Still with persistent pulm vascular 
glycol (GLYCOLAX) packet 17 g  17 g Oral Daily PRN    acetaminophen (TYLENOL) tablet 650 mg  650 mg Oral Q6H PRN    Or    acetaminophen (TYLENOL) suppository 650 mg  650 mg Rectal Q6H PRN    0.9 % sodium chloride infusion   IntraVENous Continuous    insulin glargine (LANTUS) injection vial 35 Units  35 Units SubCUTAneous Daily    insulin lispro (HUMALOG) injection vial 0-8 Units  0-8 Units SubCUTAneous TID WC    insulin lispro (HUMALOG) injection vial 0-4 Units  0-4 Units SubCUTAneous Nightly    insulin glargine (LANTUS) injection vial 25 Units  25 Units SubCUTAneous Nightly    dilTIAZem 100 mg in sodium chloride 0.9 % 100 mL infusion (ADD-[place])  2.5-15 mg/hr IntraVENous Continuous    heparin (porcine) injection 4,000 Units  4,000 Units IntraVENous PRN    heparin (porcine) injection 2,000 Units  2,000 Units IntraVENous PRN    iopamidol (ISOVUE-370) 76 % injection 100 mL  100 mL IntraVENous ONCE PRN       Data Review:   Recent Results (from the past 24 hour(s))   POCT Glucose    Collection Time: 01/30/24 11:16 AM   Result Value Ref Range    POC Glucose 248 (H) 65 - 100 mg/dL    Performed by: Harshal    Anti-Xa, Unfractionated Heparin    Collection Time: 01/30/24  2:39 PM   Result Value Ref Range    Anti-XA Unfrac Heparin >1.1 (H) 0.3 - 0.7 IU/mL   APTT    Collection Time: 01/30/24  2:39 PM   Result Value Ref Range    PTT 46.5 (H) 23.3 - 37.4 SEC   POCT Glucose    Collection Time: 01/30/24  5:23 PM   Result Value Ref Range    POC Glucose 219 (H) 65 - 100 mg/dL    Performed by: Harshal    POCT Glucose    Collection Time: 01/30/24  8:55 PM   Result Value Ref Range    POC Glucose 280 (H) 65 - 100 mg/dL    Performed by: Galo    APTT    Collection Time: 01/31/24 12:09 AM   Result Value Ref Range    .3 (H) 23.3 - 37.4 SEC   Anti-Xa, Unfractionated Heparin    Collection Time: 01/31/24 12:09 AM   Result Value Ref Range    Anti-XA Unfrac Heparin >1.1 (H) 0.3 - 0.7 IU/mL   POCT Glucose 
contact with the patient and in the care of the patient on the floor/unit where the patient is located.       Matias Amor MD   
interpretation below): Biventricular pacemaker.       
wean as tolerated. Does have RAMESH and CPAP at home however he states that he has frequent nocturia so hasn't been using. Recommend he follow up in sleep center.     Obesity: weight loss encouraged     Prognosis is poor. Recommend palliative care consult to discuss goals given HFrEF, CKD, and respiratory status.     Full Code    More than 50% of the time documented was spent in face-to-face contact with the patient and in the care of the patient on the floor/unit where the patient is located.    In this split/shared evaluation I performed performed a medically appropriate history and exam, counseled and educated the patient and/or family member, documented information in EMR, and coordinated care. which accounted for 12 minutes of clinical time.     JEANA Perry   In this split/shared evaluation I performed reviewed the patients's H&P, available images, labs, cultures., discussed case in detail with NPP, performed a medically appropriate history and exam, counseled and educated the patient and/or family member, ordered and/or reviewed medications, tests or procedures, documented information in EMR, independently interpreted images, and coordinated care. which accounted for 14 minutes clinical time.     Impression:    s/p thora bilaterally. Still  some edema but better -continue diuretics with Cardiology. Needs to get out of bed, mobility, PT, IS. Currently in chair - Recommended for pulmonary and sleep outpatient follow up.  EF 15-20 %- ? Discharge soon- he does not want rehab but looks like he needs it.  Will need O2 qualifier.      Hola Mary Jr, MD           
  Final Result   Persistent bilateral pleural effusions without significant change.         XR CHEST PORTABLE   Final Result   1.  Cardiomegaly with pacer and AICD device in place with no pneumothorax.   2.  Large right and small left pleural effusion         Vascular duplex lower extremity arteries bilateral   Final Result   1. At least mild distal right SFA stenosis, similar to 2023 exam.   2. Diffuse reduced peak systolic velocities of the left lower extremity   representing changed from 2023. This may suggest worsening inflow disease of the   common and external iliac arteries.   3. No detectable flow in the left peroneal artery. No detectable flow in either   anterior tibial artery.   4. CTA runoff is suggested.           Current Meds:  Current Facility-Administered Medications   Medication Dose Route Frequency    bumetanide (BUMEX) tablet 1 mg  1 mg Oral Daily    ondansetron (ZOFRAN-ODT) disintegrating tablet 4 mg  4 mg Oral Q4H PRN    baclofen (LIORESAL) tablet 10 mg  10 mg Oral TID PRN    bisacodyl (DULCOLAX) suppository 10 mg  10 mg Rectal Daily PRN    lactulose (CHRONULAC) 10 GM/15ML solution 20 g  20 g Oral Daily PRN    hydrALAZINE (APRESOLINE) tablet 10 mg  10 mg Oral 3 times per day    medicated lip ointment (BLISTEX)   Topical PRN    traZODone (DESYREL) tablet 50 mg  50 mg Oral Nightly    apixaban (ELIQUIS) tablet 2.5 mg  2.5 mg Oral BID    magnesium hydroxide (MILK OF MAGNESIA) 400 MG/5ML suspension 30 mL  30 mL Oral Daily PRN    glucose chewable tablet 16 g  4 tablet Oral PRN    dextrose bolus 10% 125 mL  125 mL IntraVENous PRN    Or    dextrose bolus 10% 250 mL  250 mL IntraVENous PRN    Glucagon Emergency KIT 1 mg  1 mg SubCUTAneous PRN    dextrose 10 % infusion   IntraVENous Continuous PRN    ipratropium 0.5 mg-albuterol 2.5 mg (DUONEB) nebulizer solution 1 Dose  1 Dose Inhalation Q6H WA RT    budesonide (PULMICORT) nebulizer suspension 500 mcg  0.5 mg Nebulization BID RT    sennosides-docusate 
Hemoglobin 11.0 (L) 13.6 - 17.2 g/dL    Hematocrit 35.9 (L) 41.1 - 50.3 %    MCV 94.0 82 - 102 FL    MCH 28.8 26.1 - 32.9 PG    MCHC 30.6 (L) 31.4 - 35.0 g/dL    RDW 15.5 (H) 11.9 - 14.6 %    Platelets 123 (L) 150 - 450 K/uL    MPV 9.6 9.4 - 12.3 FL    nRBC 0.00 0.0 - 0.2 K/uL   Basic Metabolic Panel w/ Reflex to MG    Collection Time: 02/03/24  3:33 AM   Result Value Ref Range    Sodium 140 136 - 146 mmol/L    Potassium 4.7 3.5 - 5.1 mmol/L    Chloride 108 103 - 113 mmol/L    CO2 30 21 - 32 mmol/L    Anion Gap 2 2 - 11 mmol/L    Glucose 151 (H) 65 - 100 mg/dL    BUN 46 (H) 8 - 23 MG/DL    Creatinine 1.80 (H) 0.8 - 1.5 MG/DL    Est, Glom Filt Rate 36 (L) >60 ml/min/1.73m2    Calcium 8.2 (L) 8.3 - 10.4 MG/DL   POCT Glucose    Collection Time: 02/03/24  6:20 AM   Result Value Ref Range    POC Glucose 124 (H) 65 - 100 mg/dL    Performed by: Manuel    POCT Glucose    Collection Time: 02/03/24 11:31 AM   Result Value Ref Range    POC Glucose 158 (H) 65 - 100 mg/dL    Performed by: Cedrick          Other Studies:  XR CHEST PORTABLE   Final Result            Decrease in right pleural effusion and increase in left pleural effusion.         XR ABDOMEN (KUB) (SINGLE AP VIEW)   Final Result      Nonobstructive bowel gas pattern.      Constipation.         XR CHEST PORTABLE   Final Result   Decreased pleural effusion status post thoracentesis without   pneumothorax.         XR CHEST PORTABLE   Final Result   Persistent bilateral pleural effusions without significant change.         XR CHEST PORTABLE   Final Result   1.  Cardiomegaly with pacer and AICD device in place with no pneumothorax.   2.  Large right and small left pleural effusion         Vascular duplex lower extremity arteries bilateral   Final Result   1. At least mild distal right SFA stenosis, similar to 2023 exam.   2. Diffuse reduced peak systolic velocities of the left lower extremity   representing changed from 2023. This may suggest 
ml/min/1.73m2    Calcium 8.7 8.3 - 10.4 MG/DL   CBC    Collection Time: 02/04/24  6:38 AM   Result Value Ref Range    WBC 8.1 4.3 - 11.1 K/uL    RBC 3.98 (L) 4.23 - 5.6 M/uL    Hemoglobin 11.4 (L) 13.6 - 17.2 g/dL    Hematocrit 37.1 (L) 41.1 - 50.3 %    MCV 93.2 82 - 102 FL    MCH 28.6 26.1 - 32.9 PG    MCHC 30.7 (L) 31.4 - 35.0 g/dL    RDW 15.3 (H) 11.9 - 14.6 %    Platelets 145 (L) 150 - 450 K/uL    MPV 9.9 9.4 - 12.3 FL    nRBC 0.00 0.0 - 0.2 K/uL   POCT Glucose    Collection Time: 02/04/24 11:31 AM   Result Value Ref Range    POC Glucose 167 (H) 65 - 100 mg/dL    Performed by: Cedrick          Other Studies:  XR CHEST PORTABLE   Final Result            Decrease in right pleural effusion and increase in left pleural effusion.         XR ABDOMEN (KUB) (SINGLE AP VIEW)   Final Result      Nonobstructive bowel gas pattern.      Constipation.         XR CHEST PORTABLE   Final Result   Decreased pleural effusion status post thoracentesis without   pneumothorax.         XR CHEST PORTABLE   Final Result   Persistent bilateral pleural effusions without significant change.         XR CHEST PORTABLE   Final Result   1.  Cardiomegaly with pacer and AICD device in place with no pneumothorax.   2.  Large right and small left pleural effusion         Vascular duplex lower extremity arteries bilateral   Final Result   1. At least mild distal right SFA stenosis, similar to 2023 exam.   2. Diffuse reduced peak systolic velocities of the left lower extremity   representing changed from 2023. This may suggest worsening inflow disease of the   common and external iliac arteries.   3. No detectable flow in the left peroneal artery. No detectable flow in either   anterior tibial artery.   4. CTA runoff is suggested.           Current Meds:  Current Facility-Administered Medications   Medication Dose Route Frequency    lactulose (CHRONULAC) 10 GM/15ML solution 20 g  20 g Oral Daily PRN    hydrALAZINE (APRESOLINE) tablet 10 
1 Dose  1 Dose Inhalation Q6H WA RT    budesonide (PULMICORT) nebulizer suspension 500 mcg  0.5 mg Nebulization BID RT    levoFLOXacin (LEVAQUIN) 750 MG/150ML infusion 750 mg  750 mg IntraVENous Q48H    rivaroxaban (XARELTO) tablet 15 mg  15 mg Oral Daily with breakfast    sennosides-docusate sodium (SENOKOT-S) 8.6-50 MG tablet 2 tablet  2 tablet Oral Daily PRN    [Held by provider] carvedilol (COREG) tablet 3.125 mg  3.125 mg Oral BID     mineral oil-hydrophilic petrolatum (AQUAPHOR) ointment   Topical Daily    sodium chloride flush 0.9 % injection 5-40 mL  5-40 mL IntraVENous 2 times per day    sodium chloride flush 0.9 % injection 5-40 mL  5-40 mL IntraVENous PRN    0.9 % sodium chloride infusion   IntraVENous PRN    polyethylene glycol (GLYCOLAX) packet 17 g  17 g Oral Daily PRN    acetaminophen (TYLENOL) tablet 650 mg  650 mg Oral Q4H PRN    ondansetron (ZOFRAN) injection 4 mg  4 mg IntraVENous Q6H PRN    aluminum & magnesium hydroxide-simethicone (MAALOX) 200-200-20 MG/5ML suspension 15 mL  15 mL Oral Q6H PRN    norepinephrine (LEVOPHED) 4 mg in sodium chloride 0.9 % 250 mL infusion  1-100 mcg/min IntraVENous Continuous    traZODone (DESYREL) tablet 50 mg  50 mg Oral Nightly PRN    metoprolol (LOPRESSOR) injection 5 mg  5 mg IntraVENous Q5 Min PRN    aspirin chewable tablet 81 mg  81 mg Oral Daily    rosuvastatin (CRESTOR) tablet 10 mg  10 mg Oral Daily    tamsulosin (FLOMAX) capsule 0.4 mg  0.4 mg Oral Daily    sodium chloride flush 0.9 % injection 5-40 mL  5-40 mL IntraVENous 2 times per day    sodium chloride flush 0.9 % injection 5-40 mL  5-40 mL IntraVENous PRN    0.9 % sodium chloride infusion   IntraVENous PRN    insulin lispro (HUMALOG) injection vial 0-8 Units  0-8 Units SubCUTAneous TID WC    insulin lispro (HUMALOG) injection vial 0-4 Units  0-4 Units SubCUTAneous Nightly    iopamidol (ISOVUE-370) 76 % injection 100 mL  100 mL IntraVENous ONCE PRN       Signed:  Ruben Vasquez MD  2/2/2024    Part of 
100 mg/dL    Performed by: Olive    PLEASE READ & DOCUMENT PPD TEST IN 48 HRS    Collection Time: 02/01/24  3:33 AM   Result Value Ref Range    PPD, (POC) Negative     mm Induration 0 0 - 5 mm   CBC    Collection Time: 02/01/24  5:43 AM   Result Value Ref Range    WBC 10.2 4.3 - 11.1 K/uL    RBC 4.14 (L) 4.23 - 5.6 M/uL    Hemoglobin 11.9 (L) 13.6 - 17.2 g/dL    Hematocrit 39.9 (L) 41.1 - 50.3 %    MCV 96.4 82 - 102 FL    MCH 28.7 26.1 - 32.9 PG    MCHC 29.8 (L) 31.4 - 35.0 g/dL    RDW 15.6 (H) 11.9 - 14.6 %    Platelets 151 150 - 450 K/uL    MPV 9.3 (L) 9.4 - 12.3 FL    nRBC 0.00 0.0 - 0.2 K/uL   Basic Metabolic Panel w/ Reflex to MG    Collection Time: 02/01/24  5:43 AM   Result Value Ref Range    Sodium 142 136 - 146 mmol/L    Potassium 4.6 3.5 - 5.1 mmol/L    Chloride 111 103 - 113 mmol/L    CO2 31 21 - 32 mmol/L    Anion Gap 0 (L) 2 - 11 mmol/L    Glucose 63 (L) 65 - 100 mg/dL    BUN 57 (H) 8 - 23 MG/DL    Creatinine 2.20 (H) 0.8 - 1.5 MG/DL    Est, Glom Filt Rate 29 (L) >60 ml/min/1.73m2    Calcium 8.3 8.3 - 10.4 MG/DL   POCT Glucose    Collection Time: 02/01/24  6:11 AM   Result Value Ref Range    POC Glucose 59 (L) 65 - 100 mg/dL    Performed by: Olive    POCT Glucose    Collection Time: 02/01/24  6:24 AM   Result Value Ref Range    POC Glucose 84 65 - 100 mg/dL    Performed by: Camelia          Other Studies:  XR CHEST PORTABLE   Final Result   Decreased pleural effusion status post thoracentesis without   pneumothorax.         XR CHEST PORTABLE   Final Result   Persistent bilateral pleural effusions without significant change.         XR CHEST PORTABLE   Final Result   1.  Cardiomegaly with pacer and AICD device in place with no pneumothorax.   2.  Large right and small left pleural effusion         Vascular duplex lower extremity arteries bilateral   Final Result   1. At least mild distal right SFA stenosis, similar to 2023 exam.   2. Diffuse reduced peak systolic velocities

## 2024-02-11 ENCOUNTER — HOME CARE VISIT (OUTPATIENT)
Dept: SCHEDULING | Facility: HOME HEALTH | Age: 84
End: 2024-02-11

## 2024-02-11 PROCEDURE — 0221000100 HH NO PAY CLAIM PROCEDURE

## 2024-02-11 PROCEDURE — G0299 HHS/HOSPICE OF RN EA 15 MIN: HCPCS

## 2024-02-12 ENCOUNTER — TELEPHONE (OUTPATIENT)
Dept: FAMILY MEDICINE CLINIC | Facility: CLINIC | Age: 84
End: 2024-02-12

## 2024-02-12 ENCOUNTER — HOME CARE VISIT (OUTPATIENT)
Dept: SCHEDULING | Facility: HOME HEALTH | Age: 84
End: 2024-02-12

## 2024-02-12 DIAGNOSIS — E11.65 UNCONTROLLED TYPE 2 DIABETES MELLITUS WITH HYPERGLYCEMIA (HCC): Primary | ICD-10-CM

## 2024-02-12 DIAGNOSIS — R53.1 GENERAL WEAKNESS: ICD-10-CM

## 2024-02-12 DIAGNOSIS — I70.202 PERONEAL ARTERY OCCLUSION, LEFT (HCC): ICD-10-CM

## 2024-02-12 DIAGNOSIS — N17.9 AKI (ACUTE KIDNEY INJURY) (HCC): ICD-10-CM

## 2024-02-12 DIAGNOSIS — N18.32 STAGE 3B CHRONIC KIDNEY DISEASE (HCC): ICD-10-CM

## 2024-02-12 DIAGNOSIS — I73.9 PAD (PERIPHERAL ARTERY DISEASE) (HCC): ICD-10-CM

## 2024-02-12 DIAGNOSIS — R53.81 DEBILITY: ICD-10-CM

## 2024-02-12 PROCEDURE — G0151 HHCP-SERV OF PT,EA 15 MIN: HCPCS

## 2024-02-12 NOTE — TELEPHONE ENCOUNTER
[facility] called - Verbal orders Nursing 3 visits 3 weeks 2 visits for 3 1 visit for 3 weeks, Aid for bathing 2 visits 3 weeks 1 visit for next 2 weeks, social work for insurance. Drug interaction between Asprin and Xarelto.

## 2024-02-13 ENCOUNTER — HOME CARE VISIT (OUTPATIENT)
Dept: HOME HEALTH SERVICES | Facility: HOME HEALTH | Age: 84
End: 2024-02-13

## 2024-02-13 VITALS
OXYGEN SATURATION: 95 % | HEART RATE: 80 BPM | WEIGHT: 259 LBS | RESPIRATION RATE: 20 BRPM | BODY MASS INDEX: 38.36 KG/M2 | DIASTOLIC BLOOD PRESSURE: 78 MMHG | HEIGHT: 69 IN | TEMPERATURE: 97.7 F | SYSTOLIC BLOOD PRESSURE: 130 MMHG

## 2024-02-13 VITALS
OXYGEN SATURATION: 95 % | RESPIRATION RATE: 18 BRPM | HEART RATE: 60 BPM | DIASTOLIC BLOOD PRESSURE: 62 MMHG | TEMPERATURE: 98 F | SYSTOLIC BLOOD PRESSURE: 102 MMHG

## 2024-02-13 NOTE — CASE COMMUNICATION
Visit Set: Skilled Nursing 3 week 3 for education and monitoring.                                       2 weeks 4-6 for education and monitoring.                                       1 weeks 7-9 for education; may be discharged early if goals met.                                       3 PRN visits were also added for education, illness, or injury.    Visit Set: Home Health Aide 2 week 3 for bathing and ADL's                                             1 weeks 4-6 for bathing and ADL's

## 2024-02-13 NOTE — TELEPHONE ENCOUNTER
Irina Mccrary [phone] from [facility] called they are discharging pt from their home care due to him being out of network with his insurance. Pt does need this care and Irina reccommended for us to place a referral to [facility] she believes they are in network. They still need previous order signed since they treated him for 2 days and discharged him today.

## 2024-02-14 ENCOUNTER — HOME CARE VISIT (OUTPATIENT)
Dept: HOME HEALTH SERVICES | Facility: HOME HEALTH | Age: 84
End: 2024-02-14

## 2024-02-15 ENCOUNTER — TELEPHONE (OUTPATIENT)
Dept: FAMILY MEDICINE CLINIC | Facility: CLINIC | Age: 84
End: 2024-02-15

## 2024-02-15 NOTE — TELEPHONE ENCOUNTER
Pt called and stated that he has no way to get to the office nor does he know how to do a virtual appointment.    He does have some things regarding his medications since he was released from the hospital and would like a call back.    Thank you

## 2024-02-19 ENCOUNTER — TELEPHONE (OUTPATIENT)
Dept: FAMILY MEDICINE CLINIC | Facility: CLINIC | Age: 84
End: 2024-02-19

## 2024-02-19 NOTE — TELEPHONE ENCOUNTER
PLEASE CALL [facility]---according to ER sptg----seen feb 17th----he is supposed to be on LEVEMIR 40 UNITS IN AM; 35 UNITS IN PM-----NOVOLOG FLEX PEN 15 UNITS BEFORE MEALS EXCEPT EVENING MEAL IN WHICH HE TAKES 10 UNITS TO AVOID NIGHTTIME HYPOGLYCEMIA.

## 2024-02-19 NOTE — TELEPHONE ENCOUNTER
Pt is reporting that his sugar was low but taking the medication has brought it up and he will discuss with you tomorrow on his visit.

## 2024-02-19 NOTE — TELEPHONE ENCOUNTER
[facility] called - First visit was on Saturday. Pt is not taking anything for his diabetes.His Blood sugar was 564. Cindy gave him 15 units of Lantis. Pt fell and had to go to the ER. His last reading was in the 200's. He has an appointment here tomorrow to clarify his insulin. He will be seen for nursing 1 x week. He will get OT and PT evaulation. He has a incision on his chest for a pacemaker. Everything is clean and dry and he has a follow up with them to check that.

## 2024-02-20 ENCOUNTER — TELEMEDICINE (OUTPATIENT)
Dept: FAMILY MEDICINE CLINIC | Facility: CLINIC | Age: 84
End: 2024-02-20
Payer: MEDICARE

## 2024-02-20 ENCOUNTER — TELEPHONE (OUTPATIENT)
Dept: FAMILY MEDICINE CLINIC | Facility: CLINIC | Age: 84
End: 2024-02-20

## 2024-02-20 ENCOUNTER — TELEPHONE (OUTPATIENT)
Age: 84
End: 2024-02-20

## 2024-02-20 DIAGNOSIS — I50.22 CHRONIC SYSTOLIC CONGESTIVE HEART FAILURE (HCC): ICD-10-CM

## 2024-02-20 DIAGNOSIS — Z95.0 PACEMAKER: ICD-10-CM

## 2024-02-20 DIAGNOSIS — N17.9 AKI (ACUTE KIDNEY INJURY) (HCC): ICD-10-CM

## 2024-02-20 DIAGNOSIS — I73.9 PAD (PERIPHERAL ARTERY DISEASE) (HCC): ICD-10-CM

## 2024-02-20 DIAGNOSIS — D63.8 ANEMIA IN OTHER CHRONIC DISEASES CLASSIFIED ELSEWHERE: ICD-10-CM

## 2024-02-20 DIAGNOSIS — I48.91 ATRIAL FIBRILLATION, UNSPECIFIED TYPE (HCC): ICD-10-CM

## 2024-02-20 DIAGNOSIS — Z85.51 HISTORY OF BLADDER CANCER: ICD-10-CM

## 2024-02-20 DIAGNOSIS — J90 BILATERAL PLEURAL EFFUSION: ICD-10-CM

## 2024-02-20 DIAGNOSIS — E11.65 UNCONTROLLED TYPE 2 DIABETES MELLITUS WITH HYPERGLYCEMIA (HCC): Primary | ICD-10-CM

## 2024-02-20 PROCEDURE — 99443 PR PHYS/QHP TELEPHONE EVALUATION 21-30 MIN: CPT | Performed by: FAMILY MEDICINE

## 2024-02-20 ASSESSMENT — ENCOUNTER SYMPTOMS
ABDOMINAL DISTENTION: 0
COLOR CHANGE: 0
PHOTOPHOBIA: 0
BLOOD IN STOOL: 0
ABDOMINAL PAIN: 0
NAUSEA: 0
EYE PAIN: 0
COUGH: 0
SHORTNESS OF BREATH: 0
SORE THROAT: 0

## 2024-02-20 NOTE — TELEPHONE ENCOUNTER
Home health called today and they need verbal orders for this patient regarding:    Continue social work with him for community rescources    Thank you

## 2024-02-20 NOTE — TELEPHONE ENCOUNTER
Patient called local cardiologist and they are refusing to see him until the device is checked here first. Patient does not have transportation. Daughter will remove the dressing tomorrow and send a picture through [employer]. Patient aware to call once complete.     Patient will scheduled in the [place] office the next 1-2 weeks, he is aware device settings need changed. Patient agreeable.

## 2024-02-20 NOTE — TELEPHONE ENCOUNTER
Patient called and said his cardiologist office in [place] (Dr. Kiran Brown) said since they did not put the device in that they can not do the device checks. Patient said he needs help getting this straightened out.  Traveling to [facility] [place] is not an option because he can not get a ride to [place]. Please call patient to assist.

## 2024-02-21 ENCOUNTER — CARE COORDINATION (OUTPATIENT)
Dept: CARE COORDINATION | Facility: CLINIC | Age: 84
End: 2024-02-21

## 2024-02-21 ENCOUNTER — TELEPHONE (OUTPATIENT)
Age: 84
End: 2024-02-21

## 2024-02-21 NOTE — TELEPHONE ENCOUNTER
Spoke with home health nurse and pt daughter. Nurse stated cite look fine. However pt and daughter can not get into the my chart to upload a picture of cite.     Tried calling device clinic for advise to speak with daughter.

## 2024-02-21 NOTE — PROGRESS NOTES
Kiran Galvez is a 83 y.o. male evaluated via audio-only technology on 2/20/2024 for No chief complaint on file.  .      Assessment & Plan:   Uncontrolled type 2 diabetes mellitus with hyperglycemia (HCC)  -     External Referral to Home Health  Chronic systolic congestive heart failure ([facility])--I SPENT  GREATER THAN 45 MINUTES ON THE PHONE WITH PATIENT REGARDING  MEDICATION RECONCILIATION. HIS WIFE IS IN HOSPITAL AND DAUGHTER DOING HER BEST TO TAKE CARE OF HIM. I CAN MOST CERTAINLY CERTIFY THAT HE IS HOME BOUND AND IN NEED OF EDUCATION REGARDING HIS MEDICATIONS AND COMPLIANCE. EDUCATION IS NEEDED INVOLVING INSULINS AS WELL AS DIETARY MEASURES. SOCIAL WORK IS NEEDED AS SITUATION IS POOR.  -     External Referral to Home Health  Pacemaker  -     External Referral to Home Health  Bilateral pleural effusion  -     External Referral to Home Health  History of bladder cancer  -     External Referral to Home Health  Anemia in other chronic diseases classified elsewhere  -     External Referral to Home Health  PAD (peripheral artery disease) ([facility])  -     External Referral to Home Health  JUANCARLOS (acute kidney injury) ([facility])  -     External Referral to Home Health  Atrial fibrillation, unspecified type ([facility])  -     External Referral to Home Health    No follow-ups on file.     Subjective:   The patient is seen for hospital follow up. He was seen in the office at the end of January where vascular insufficiency was suspected along with increased weakness and worsening CHF.--the patient had occlusion of peroneal artery , congestive heart failure ---systolic function 15 percent----seen by cardiology and had av dwain ablation with permanent pacemaker placement. He since was seen in [place] as wife and daughter thought he was worse. He was sent home and evaluated by [facility] who stated '' he didn't qualify''    Prior to Admission medications    Medication Sig Start Date End Date Taking? Authorizing Provider

## 2024-02-21 NOTE — CARE COORDINATION
RNCM attempted to reach pt after receiving referral from PCP, no answer message states \"called party is temporarily unavailable\".  Will continue attempts to reach pt.

## 2024-02-21 NOTE — TELEPHONE ENCOUNTER
Took photo of chest from current pace maker surgery.  Cannot get into patients mychart to post.    Needs help as how else to get it to triage.

## 2024-02-22 ENCOUNTER — TELEPHONE (OUTPATIENT)
Dept: FAMILY MEDICINE CLINIC | Facility: CLINIC | Age: 84
End: 2024-02-22

## 2024-02-22 ENCOUNTER — HOME CARE VISIT (OUTPATIENT)
Dept: HOME HEALTH SERVICES | Facility: HOME HEALTH | Age: 84
End: 2024-02-22
Payer: MEDICARE

## 2024-02-22 ENCOUNTER — CARE COORDINATION (OUTPATIENT)
Dept: CARE COORDINATION | Facility: CLINIC | Age: 84
End: 2024-02-22

## 2024-02-22 NOTE — TELEPHONE ENCOUNTER
Pt is having a hard time regulating his blood sugar. He took 30 units of insulin and his sugar was 53. Call pt to advise.

## 2024-02-22 NOTE — CARE COORDINATION
TIMUR BEARDEN received referral regarding assistance with pt to ensure home health  scheduled.  This TIMUR CM reached out to [facility] home health to confirm information.  However, as of yesterday, pt has been recommended to [facility]'s hospice services instead.  He is scheduled to be evaluated tentatively tomorrow per [facility] representative.  This TIMUR CM relayed information to pt's MD and ACM RN this day.  No additional TIMUR BEARDEN needs noted or voiced at this time.  No program to be opened at this time.

## 2024-02-22 NOTE — TELEPHONE ENCOUNTER
Talked to pt and let him know to only do 20 units and will call home health and see what's going on.

## 2024-02-22 NOTE — TELEPHONE ENCOUNTER
Picture received w/ site stable and approximated. Minimal swelling. No s/s of infection or erosion. Dressing was removed. Patient will monitor.     Patient aware he needs seen in the next 2-3 weeks for device changes. Currently with no transportation.     Aware to call once he can come in.

## 2024-02-22 NOTE — TELEPHONE ENCOUNTER
Call house find out what is going on---WE MAY TO DECREASE TO 20 UNITS-----GIVE [facility] A CALL --GET OUT THERE

## 2024-02-23 ENCOUNTER — TELEPHONE (OUTPATIENT)
Dept: FAMILY MEDICINE CLINIC | Facility: CLINIC | Age: 84
End: 2024-02-23

## 2024-02-23 NOTE — TELEPHONE ENCOUNTER
Home health called today in regards to this patient and stated that she is calling to verify the dosage of the  insulin detemir (Levemir flexpen) are records show 15 units nightly and he is only taking 5    She would also like to know he is missing his Novolog Flexipen and wants to know is he supposed to be taking that as well.    She also stated that the patient is no longer taking the Alfuzosin as he said it is not working and decided on his own to stop it.      Please advise.    Thank you

## 2024-02-24 NOTE — TELEPHONE ENCOUNTER
HE HAS HAD LOW BLOOD SUGARS-----JUST CONTINUE 5 UNITS FOR NOW AND MONITOR SUGARS---PLEASE GO OUT MONDAY AND CHECK ------WE WANT TO AVOID LOW SUGARS------THIS IS A VERY COMPLEX CASE.

## 2024-02-27 ENCOUNTER — TELEPHONE (OUTPATIENT)
Dept: FAMILY MEDICINE CLINIC | Facility: CLINIC | Age: 84
End: 2024-02-27

## 2024-02-27 NOTE — TELEPHONE ENCOUNTER
Pt called and stated that he had several questions to ask and would like for you to sort them out for him.    Please give him a call.    Thank you

## 2024-03-01 ENCOUNTER — CARE COORDINATION (OUTPATIENT)
Dept: CARE COORDINATION | Facility: CLINIC | Age: 84
End: 2024-03-01

## 2024-03-01 ENCOUNTER — TELEPHONE (OUTPATIENT)
Dept: FAMILY MEDICINE CLINIC | Facility: CLINIC | Age: 84
End: 2024-03-01

## 2024-03-01 DIAGNOSIS — J90 PLEURAL EFFUSION ON RIGHT: Primary | ICD-10-CM

## 2024-03-01 DIAGNOSIS — J90 PLEURAL EFFUSION ON LEFT: ICD-10-CM

## 2024-03-01 NOTE — CARE COORDINATION
ACM spoke w/ pt regarding referral from PCP, introduced CCM services and role of ACM.  Pt is agreeable to CCM services.  Discussed dc from [facility] 2/9/24, pt has [facility] RN, filiberto, PT.  He is cg for his wife who is current w/ [facility].    Discussed medications, pt unable to locate dc paperwork while on line w/ ACM.  ACM reviewed medication list, pt states \"Bumex works a little too well\".  Pt discovered he didn't have novolog prescription for SSI.      ACM called [facility] who states they did not receive prescription for novolog.  ACM called PCP, spoke w/ Emily and requested f/u appt scheduled 3/6/24 1020, and to please send in rx for novolog SSI.  ACM called pt back w/ appt, he is agreeable.  Discussed DM mgmt, confirmed Levemir dosage.      Pt is unable to drive, is on O2 @2ltrs, dgtr from [place] is staying with he and wife, however she is currently unable to drive d/t wearing boot after foot surg.  Son lives in [place]. No [employer] fam or neighbors, he states he will get uber to take him to his f/u appt.     Discussed f/u w/ cardiology, pt informed f/u scheduled 5/2024.  He reports he was told by his previous cardiologist at [facility] that they will not see him since they didn't place his PM. Ovs will be difficult d/t transportation.     Pt has ACM contact information, ACM will f/u in 3d on Monday 3/4/24.

## 2024-03-04 ENCOUNTER — CARE COORDINATION (OUTPATIENT)
Dept: CARE COORDINATION | Facility: CLINIC | Age: 84
End: 2024-03-04

## 2024-03-04 RX ORDER — INSULIN ASPART 100 [IU]/ML
INJECTION, SOLUTION INTRAVENOUS; SUBCUTANEOUS
Qty: 5 ADJUSTABLE DOSE PRE-FILLED PEN SYRINGE | Refills: 3 | Status: SHIPPED | OUTPATIENT
Start: 2024-03-04

## 2024-03-04 RX ORDER — INSULIN ASPART 100 [IU]/ML
INJECTION, SOLUTION INTRAVENOUS; SUBCUTANEOUS
Qty: 5 ADJUSTABLE DOSE PRE-FILLED PEN SYRINGE | Refills: 3 | Status: SHIPPED | OUTPATIENT
Start: 2024-03-04 | End: 2024-03-04 | Stop reason: SDUPTHER

## 2024-03-04 NOTE — CARE COORDINATION
Ambulatory Care Coordination Note  3/4/2024    Patient Current Location:  Home: [address]  [place] [address]     ACM contacted the patient by telephone. Verified name and  with patient as identifiers. Provided introduction to self, and explanation of the ACM role.     Challenges to be reviewed by the provider   Additional needs identified to be addressed with provider: No  none               Method of communication with provider: none.    ACM: Yeni Olsen, RN    Pt reports no change over weekend.  Discussed resp sxs to observe and report.  Pt notified SSI sent in to pharmacy this am, [facility] will deliver to pt. He verbalizes understanding as he has used SSI previously.  Confirmed appt for PCP 3/6 and [facility] 3/7.  ACM will follow for care coordination and outcome of appts.     Offered patient enrollment in the Remote Patient Monitoring (RPM) program for in-home monitoring:  no wifi .    Care Coordination Interventions    Referral from Primary Care Provider: No  Suggested Interventions and Community Resources          Goals Addressed    None         Future Appointments   Date Time Provider Department Center   3/6/2024 10:20 AM Richelle Lin PA-C [facility] AMB   3/7/2024  3:00 PM Gail Ozuna MD [facility] AMB   2024 10:15 AM [facility] DEVICE 39 [facility] AM[facility]   2024 10:45 AM Ilan Montana MD [facility]

## 2024-03-04 NOTE — TELEPHONE ENCOUNTER
I sent the requested medication/product in. Please let patient know to check with their pharmacy  Thanks  Richelle HILLS

## 2024-03-11 ENCOUNTER — OFFICE VISIT (OUTPATIENT)
Dept: FAMILY MEDICINE CLINIC | Facility: CLINIC | Age: 84
End: 2024-03-11
Payer: MEDICARE

## 2024-03-11 VITALS
DIASTOLIC BLOOD PRESSURE: 72 MMHG | TEMPERATURE: 98.4 F | SYSTOLIC BLOOD PRESSURE: 130 MMHG | BODY MASS INDEX: 33.21 KG/M2 | HEIGHT: 69 IN | HEART RATE: 71 BPM | WEIGHT: 224.2 LBS | OXYGEN SATURATION: 94 %

## 2024-03-11 DIAGNOSIS — Z71.89 ACP (ADVANCE CARE PLANNING): ICD-10-CM

## 2024-03-11 DIAGNOSIS — E11.65 UNCONTROLLED TYPE 2 DIABETES MELLITUS WITH HYPERGLYCEMIA (HCC): Primary | ICD-10-CM

## 2024-03-11 PROCEDURE — 99214 OFFICE O/P EST MOD 30 MIN: CPT | Performed by: PHYSICIAN ASSISTANT

## 2024-03-11 PROCEDURE — 1123F ACP DISCUSS/DSCN MKR DOCD: CPT | Performed by: PHYSICIAN ASSISTANT

## 2024-03-11 PROCEDURE — 3051F HG A1C>EQUAL 7.0%<8.0%: CPT | Performed by: PHYSICIAN ASSISTANT

## 2024-03-11 ASSESSMENT — ENCOUNTER SYMPTOMS: COUGH: 0

## 2024-03-11 NOTE — PROGRESS NOTES
[facility]  [address] [place] [address]  Phone: [phone]  Fax [phone]  Provider : Richelle Lin PA-C      Patient: Kiran Galvez  YOB: 1940  Patient Age 83 y.o.  Patient sex: male  Medical Record:  [id number]  Visit Date:3/11/2024   Author:  Richelle Lin PA-C    Family Practice Clinic Note     Chief complaint  Kiran Galvez  is a 83 y.o. male who was seen on 03/11/24  12:38 PM  for the following reasons:    Chief Complaint   Patient presents with    Follow-Up from Hospital     Hematoma of pacemaker pocket    Other     Unsure of meds---states  \" they sent me home with new ones and the visiting nurse took away the old ones\"       Current Medical problems addressed  Kiran is 84 yo  male with hx of htn, type 2 dm on insulin, hyperlipidemia, SVT, bladder cancer, CKD s/p pacemaker placed 2 weeks ago.  He reported to er with swelling and bruising   TO ER on 2/24/24 for hematoma of pacemaker  at [facility] in [place]  Diabetes  He is unclear what insulin he thinks its levemir and has SSI for novolog and plans on getting that today via a mail order- he had not started that with some confusion but now understands it .  And he is clear that he is taking 15 units of levemir.  He notes home readings have been 225-351 but in the past had some hypoglycemia events  Lab Results   Component Value Date    LABA1C 7.2 (H) 01/03/2024    LABA1C 8.8 (H) 09/27/2023    LABA1C 8.4 (H) 07/11/2023     Lab Results   Component Value Date    LDLCALC 59.4 07/11/2023    CREATININE 1.50 02/08/2024   He was seeing a urologist and he is in [place] and finds that hard to get to it and asking for urologist. Closer and advised [place] works out of [address] office too and to ask to switch to that one for convenence    He doesn't know if he is on rosuvastatin or carvediolol    His wife is on hospice and  nurses are coming daily as her health has worsened.  She recently stopped eating or drinking and

## 2024-03-11 NOTE — PATIENT INSTRUCTIONS

## 2024-03-12 ENCOUNTER — CLINICAL DOCUMENTATION (OUTPATIENT)
Dept: SPIRITUAL SERVICES | Age: 84
End: 2024-03-12

## 2024-03-12 ENCOUNTER — TELEPHONE (OUTPATIENT)
Dept: FAMILY MEDICINE CLINIC | Facility: CLINIC | Age: 84
End: 2024-03-12

## 2024-03-12 DIAGNOSIS — K59.04 CHRONIC IDIOPATHIC CONSTIPATION: Primary | ICD-10-CM

## 2024-03-12 NOTE — TELEPHONE ENCOUNTER
Mr. Galvez had an appt with JEANA Lin on 3/11 and she wanting to know what current medications he is taking.I did ask if he needed any refills for the medications and patient stated no.    - Carvedilol 3.125MG  - Linzess 72MG  -Xavelto 15MG  -Rosuvastatin 10MG  -Bumetanide 1MG

## 2024-03-12 NOTE — PROGRESS NOTES
Advance Care Planning   Ambulatory ACP Specialist Patient Outreach    Date:  3/12/2024    ACP Specialist:  Thao Sainz    Outreach call to patient in follow-up to ACP Specialist referral from: Richelle Lin PA-    [x] PCP  [] Provider   [] Ambulatory Care Management [] Other     For:                  [x] Advance Directive Assistance              [] Complete Portable DNR order              [] Complete POST/POLST/MOST              [] Code Status Discussion             [x] Discuss Goals of Care             [] Early ACP Decision-Making              [] Other (Specify)    Date Referral Received: 3/11/24    Next Step:   [x] ACP scheduled conversation  [] Outreach again in one week               [x] Email / Mail ACP Info Sheets  [x] Email / Mail Advance Directive   [] Closing referral.  Routing closure to referring provider/staff and to ACP Specialist .    [] Closure letter mailed to patient with invitation to contact ACP Specialist if / when ready.   [] Other (Specify here):         [x] At this time, Healthcare Decision Maker Is: Advance Care Planning   Healthcare Decision Maker:    Primary Decision Maker: tanna tan - Spouse - [phone]    Secondary Decision Maker: gloria tan - Child - [phone]    Secondary Decision Maker: joanna Stroud \"esau\" - Child - [phone]         [] Primary agent named in scanned advance directive.    [x] Legal Next of Kin.     [] Unable to determine legal decision maker at this time.         Outreaches:         [x] 1st -  Date:  3/12/24               Intervention:  [x] Spoke with Patient   [] Left Voice mail [] Email / Mail    [] [employer]  [] Other (Specify) :     Outcomes:  Spoke with patient on mobile phone number which is also listed as the home number scheduling a telephone conversation with ACP Specialist Kosta Gonzalez on Thursday, 3/21/24 at 11:00 a.m.           [] 2nd -  Date:                 Intervention:  [] Spoke with Patient  [] Left Voice mail

## 2024-03-13 RX ORDER — RIVAROXABAN 15 MG/1
15 TABLET, FILM COATED ORAL
Qty: 90 TABLET | Refills: 3 | Status: SHIPPED | OUTPATIENT
Start: 2024-03-13

## 2024-03-15 ENCOUNTER — CARE COORDINATION (OUTPATIENT)
Dept: CARE COORDINATION | Facility: CLINIC | Age: 84
End: 2024-03-15

## 2024-03-15 NOTE — CARE COORDINATION
RNCM attempted to reach pt, no answer unable to leave HIPAA compliant vm.  Will continue attempts to reach pt.

## 2024-03-20 ENCOUNTER — TELEPHONE (OUTPATIENT)
Dept: FAMILY MEDICINE CLINIC | Facility: CLINIC | Age: 84
End: 2024-03-20

## 2024-03-21 ENCOUNTER — TELEPHONE (OUTPATIENT)
Dept: FAMILY MEDICINE CLINIC | Facility: CLINIC | Age: 84
End: 2024-03-21

## 2024-03-21 ENCOUNTER — CLINICAL DOCUMENTATION (OUTPATIENT)
Dept: CARE COORDINATION | Facility: CLINIC | Age: 84
End: 2024-03-21

## 2024-03-21 RX ORDER — BLOOD-GLUCOSE METER
1 KIT MISCELLANEOUS DAILY
COMMUNITY
End: 2024-03-21 | Stop reason: SDUPTHER

## 2024-03-21 RX ORDER — BLOOD-GLUCOSE METER
1 KIT MISCELLANEOUS DAILY
Qty: 1 KIT | Refills: 0 | Status: SHIPPED | OUTPATIENT
Start: 2024-03-21

## 2024-03-21 NOTE — ACP (ADVANCE CARE PLANNING)
Advance Care Planning     Advance Care Planning Clinical Specialist  Conversation Note      Date of ACP Conversation: 3/21/2024    Conversation Conducted with: Patient with Decision Making Capacity    ACP Clinical Specialist: ALYSA ROGERS        Healthcare Decision Maker: Mikki Stroud    Current Designated Healthcare Decision Maker:     Primary Decision Maker: tanna tan - Spouse - [phone]    Secondary Decision Maker: gloria tan - Child - [phone]    Secondary Decision Maker: mikki Stroud \"esau\" - Child - [phone]  Click here to complete Healthcare Decision Makers including section of the Healthcare Decision Maker Relationship (ie \"Primary\")      Care Preferences    Hospitalization:  \"If your health worsens and it becomes clear that your chance of recovery is unlikely, what would your preference be regarding hospitalization?\"    Choice:  [] The patient wants hospitalization.  [x] The patient prefers comfort-focused treatment without hospitalization.    Ventilation:  \"If you were in your present state of health and suddenly became very ill and were unable to breathe on your own, what would your preference be about the use of a ventilator (breathing machine) if it were available to you?\"      If the patient would desire the use of ventilator (breathing machine), answer \"yes\".  If not, \"no\": yes    \"If your health worsens and it becomes clear that your chance of recovery is unlikely, what would your preference be about the use of a ventilator (breathing machine) if it were available to you?\"     Would the patient desire the use of ventilator (breathing machine)?: No      Resuscitation  \"CPR works best to restart the heart when there is a sudden event, like a heart attack, in someone who is otherwise healthy. Unfortunately, CPR does not typically restart the heart for people who have serious health conditions or who are very sick.\"    \"In the event your heart stopped as a result of an

## 2024-03-21 NOTE — TELEPHONE ENCOUNTER
Pt wants to know if it okay to drive to his appts from now on.    Please call [phone] to discuss with the patient.    Thanks

## 2024-03-22 ENCOUNTER — TELEPHONE (OUTPATIENT)
Dept: FAMILY MEDICINE CLINIC | Facility: CLINIC | Age: 84
End: 2024-03-22

## 2024-03-22 DIAGNOSIS — Z79.4 TYPE 2 DIABETES MELLITUS WITH OTHER SPECIFIED COMPLICATION, WITH LONG-TERM CURRENT USE OF INSULIN (HCC): Primary | ICD-10-CM

## 2024-03-22 DIAGNOSIS — E11.69 TYPE 2 DIABETES MELLITUS WITH OTHER SPECIFIED COMPLICATION, WITH LONG-TERM CURRENT USE OF INSULIN (HCC): Primary | ICD-10-CM

## 2024-03-22 RX ORDER — BLOOD SUGAR DIAGNOSTIC
1 STRIP MISCELLANEOUS 3 TIMES DAILY
Qty: 100 EACH | Refills: 3 | Status: SHIPPED | OUTPATIENT
Start: 2024-03-22

## 2024-03-22 NOTE — TELEPHONE ENCOUNTER
Mr. Galvez received his new blood sugar monitor machine recently, but he didn't get any test strips.  His pharmacy recommended him One Touch Verio.    A new prescription needs to be send to [facility].  Its phone number is [phone].    Please contact the patient when is has been sent to his pharmacy.

## 2024-03-26 ENCOUNTER — CARE COORDINATION (OUTPATIENT)
Dept: CARE COORDINATION | Facility: CLINIC | Age: 84
End: 2024-03-26

## 2024-03-26 NOTE — CARE COORDINATION
Ambulatory Care Coordination Note  3/26/2024    Patient Current Location:  Home: [address]  [place] [address]     ACM contacted the patient by telephone. Verified name and  with patient as identifiers. Provided introduction to self, and explanation of the ACM role.     Challenges to be reviewed by the provider   Additional needs identified to be addressed with provider: No  none               Method of communication with provider: none.    ACM: Yeni Olsen, RN    Discussed DM, SSI.  Pt reports BS <250, denies hypoglycemia. Pt is driving self to [facility], dgtr remains here from [place] after passing of pt's wife, but not comfortable to drive. Pt has no transportation benefits through Insurance.  He plans to put house up for sale soon and will return home w/ dgtr to [place].  Pt continues to have [facility] RN/PT/OT through 24.      Pt reports breathing is doing well, denies SOB/swelling.  Thinks he may be able to come off O2 soon.  Discussed f/u w/ PCP and PULM next month. Will f/u in 2wks.     Offered patient enrollment in the Remote Patient Monitoring (RPM) program for in-home monitoring:  na, pt is moving back to [place] w/ dgtr .    Care Coordination Interventions    Referral from Primary Care Provider: No  Suggested Interventions and Community Resources          Goals Addressed                   This Visit's Progress     Conditions and Symptoms   On track     I will schedule office visits, as directed by my provider.  I will keep my appointment or reschedule if I have to cancel.  I will notify my provider of any barriers to my plan of care.  I will follow my Zone Management tool to seek urgent or emergent care.  I will notify my provider of any symptoms that indicate a worsening of my condition.    Barriers: overwhelmed by complexity of regimen  Plan for overcoming my barriers: ACM care coordination w/ multiple providers and educ  Confidence: 9/10  Anticipated Goal Completion Date: 24

## 2024-04-02 ENCOUNTER — TELEPHONE (OUTPATIENT)
Dept: FAMILY MEDICINE CLINIC | Facility: CLINIC | Age: 84
End: 2024-04-02

## 2024-04-02 DIAGNOSIS — E11.65 UNCONTROLLED TYPE 2 DIABETES MELLITUS WITH HYPERGLYCEMIA (HCC): Primary | ICD-10-CM

## 2024-04-02 NOTE — TELEPHONE ENCOUNTER
[facility] called - Pt's blood sugars have been running high in the 200's and a couple times 400. Jessica is requesting to up his Levemir.Call to advise.

## 2024-04-02 NOTE — TELEPHONE ENCOUNTER
Talked to home health nurse let her know to increase his Levemir to 20 units, she did say he takes it in the morning so she will tell him to do this in the morning as well just to keep him from getting confused.

## 2024-04-02 NOTE — TELEPHONE ENCOUNTER
I HAVE SENT MORE LEVEMIR IN-------INCREASE TO 20 UNITS PM-----make sure no early morning hypoglycemia---see if the sliding scale is adequate to cover---ALSO MAKE SURE FOLLOWING DIET.

## 2024-04-09 ENCOUNTER — CARE COORDINATION (OUTPATIENT)
Dept: CARE COORDINATION | Facility: CLINIC | Age: 84
End: 2024-04-09

## 2024-04-09 NOTE — CARE COORDINATION
Pt reports he is unable to hear d/t hearing aids being in service until Friday, requests call back when he is able to hear.

## 2024-04-11 ENCOUNTER — CARE COORDINATION (OUTPATIENT)
Dept: CARE COORDINATION | Facility: CLINIC | Age: 84
End: 2024-04-11

## 2024-04-11 ENCOUNTER — OFFICE VISIT (OUTPATIENT)
Dept: FAMILY MEDICINE CLINIC | Facility: CLINIC | Age: 84
End: 2024-04-11
Payer: MEDICARE

## 2024-04-11 VITALS
TEMPERATURE: 97.8 F | HEART RATE: 58 BPM | BODY MASS INDEX: 32.58 KG/M2 | WEIGHT: 220 LBS | HEIGHT: 69 IN | OXYGEN SATURATION: 96 % | DIASTOLIC BLOOD PRESSURE: 80 MMHG | SYSTOLIC BLOOD PRESSURE: 124 MMHG

## 2024-04-11 DIAGNOSIS — I70.202: ICD-10-CM

## 2024-04-11 DIAGNOSIS — D50.9 IRON DEFICIENCY ANEMIA, UNSPECIFIED IRON DEFICIENCY ANEMIA TYPE: ICD-10-CM

## 2024-04-11 DIAGNOSIS — E11.69 TYPE 2 DIABETES MELLITUS WITH OTHER SPECIFIED COMPLICATION, WITH LONG-TERM CURRENT USE OF INSULIN (HCC): Primary | ICD-10-CM

## 2024-04-11 DIAGNOSIS — I42.0 DILATED CARDIOMYOPATHY (HCC): ICD-10-CM

## 2024-04-11 DIAGNOSIS — N18.32 STAGE 3B CHRONIC KIDNEY DISEASE (HCC): ICD-10-CM

## 2024-04-11 DIAGNOSIS — Z79.4 TYPE 2 DIABETES MELLITUS WITH OTHER SPECIFIED COMPLICATION, WITH LONG-TERM CURRENT USE OF INSULIN (HCC): Primary | ICD-10-CM

## 2024-04-11 DIAGNOSIS — J90 PLEURAL EFFUSION: ICD-10-CM

## 2024-04-11 DIAGNOSIS — R09.02 HYPOXEMIA: ICD-10-CM

## 2024-04-11 DIAGNOSIS — I48.91 ATRIAL FIBRILLATION, UNSPECIFIED TYPE (HCC): ICD-10-CM

## 2024-04-11 DIAGNOSIS — E78.49 OTHER HYPERLIPIDEMIA: ICD-10-CM

## 2024-04-11 LAB
ALBUMIN SERPL-MCNC: 3.3 G/DL (ref 3.2–4.6)
ALBUMIN/GLOB SERPL: 1 (ref 0.4–1.6)
ALP SERPL-CCNC: 119 U/L (ref 50–136)
ALT SERPL-CCNC: 23 U/L (ref 12–65)
ANION GAP SERPL CALC-SCNC: 1 MMOL/L (ref 2–11)
AST SERPL-CCNC: 18 U/L (ref 15–37)
BASOPHILS # BLD: 0.1 K/UL (ref 0–0.2)
BASOPHILS NFR BLD: 1 % (ref 0–2)
BILIRUB SERPL-MCNC: 0.7 MG/DL (ref 0.2–1.1)
BUN SERPL-MCNC: 33 MG/DL (ref 8–23)
CALCIUM SERPL-MCNC: 8.9 MG/DL (ref 8.3–10.4)
CHLORIDE SERPL-SCNC: 101 MMOL/L (ref 103–113)
CHOLEST SERPL-MCNC: 149 MG/DL
CO2 SERPL-SCNC: 37 MMOL/L (ref 21–32)
CREAT SERPL-MCNC: 1.7 MG/DL (ref 0.8–1.5)
DIFFERENTIAL METHOD BLD: ABNORMAL
EOSINOPHIL # BLD: 0.5 K/UL (ref 0–0.8)
EOSINOPHIL NFR BLD: 5 % (ref 0.5–7.8)
ERYTHROCYTE [DISTWIDTH] IN BLOOD BY AUTOMATED COUNT: 14.6 % (ref 11.9–14.6)
EST. AVERAGE GLUCOSE BLD GHB EST-MCNC: 220 MG/DL
FERRITIN SERPL-MCNC: 114 NG/ML (ref 8–388)
GLOBULIN SER CALC-MCNC: 3.4 G/DL (ref 2.8–4.5)
GLUCOSE SERPL-MCNC: 262 MG/DL (ref 65–100)
HBA1C MFR BLD: 9.3 % (ref 4.8–5.6)
HCT VFR BLD AUTO: 41.1 % (ref 41.1–50.3)
HDLC SERPL-MCNC: 47 MG/DL (ref 40–60)
HDLC SERPL: 3.2
HGB BLD-MCNC: 13 G/DL (ref 13.6–17.2)
IMM GRANULOCYTES # BLD AUTO: 0.1 K/UL (ref 0–0.5)
IMM GRANULOCYTES NFR BLD AUTO: 1 % (ref 0–5)
IRON SERPL-MCNC: 60 UG/DL (ref 35–150)
LDLC SERPL CALC-MCNC: 77.4 MG/DL
LYMPHOCYTES # BLD: 3.6 K/UL (ref 0.5–4.6)
LYMPHOCYTES NFR BLD: 36 % (ref 13–44)
MCH RBC QN AUTO: 28.3 PG (ref 26.1–32.9)
MCHC RBC AUTO-ENTMCNC: 31.6 G/DL (ref 31.4–35)
MCV RBC AUTO: 89.3 FL (ref 82–102)
MONOCYTES # BLD: 0.9 K/UL (ref 0.1–1.3)
MONOCYTES NFR BLD: 9 % (ref 4–12)
NEUTS SEG # BLD: 5 K/UL (ref 1.7–8.2)
NEUTS SEG NFR BLD: 48 % (ref 43–78)
NRBC # BLD: 0 K/UL (ref 0–0.2)
PLATELET # BLD AUTO: 201 K/UL (ref 150–450)
PMV BLD AUTO: 10.1 FL (ref 9.4–12.3)
POTASSIUM SERPL-SCNC: 4.1 MMOL/L (ref 3.5–5.1)
PROT SERPL-MCNC: 6.7 G/DL (ref 6.3–8.2)
RBC # BLD AUTO: 4.6 M/UL (ref 4.23–5.6)
SODIUM SERPL-SCNC: 139 MMOL/L (ref 136–146)
TRIGL SERPL-MCNC: 123 MG/DL (ref 35–150)
VLDLC SERPL CALC-MCNC: 24.6 MG/DL (ref 6–23)
WBC # BLD AUTO: 10.1 K/UL (ref 4.3–11.1)

## 2024-04-11 PROCEDURE — 3046F HEMOGLOBIN A1C LEVEL >9.0%: CPT | Performed by: FAMILY MEDICINE

## 2024-04-11 PROCEDURE — 1123F ACP DISCUSS/DSCN MKR DOCD: CPT | Performed by: FAMILY MEDICINE

## 2024-04-11 PROCEDURE — 99214 OFFICE O/P EST MOD 30 MIN: CPT | Performed by: FAMILY MEDICINE

## 2024-04-11 SDOH — ECONOMIC STABILITY: FOOD INSECURITY: WITHIN THE PAST 12 MONTHS, YOU WORRIED THAT YOUR FOOD WOULD RUN OUT BEFORE YOU GOT MONEY TO BUY MORE.: NEVER TRUE

## 2024-04-11 SDOH — ECONOMIC STABILITY: FOOD INSECURITY: WITHIN THE PAST 12 MONTHS, THE FOOD YOU BOUGHT JUST DIDN'T LAST AND YOU DIDN'T HAVE MONEY TO GET MORE.: NEVER TRUE

## 2024-04-11 SDOH — ECONOMIC STABILITY: INCOME INSECURITY: HOW HARD IS IT FOR YOU TO PAY FOR THE VERY BASICS LIKE FOOD, HOUSING, MEDICAL CARE, AND HEATING?: NOT HARD AT ALL

## 2024-04-11 NOTE — CARE COORDINATION
ACM receive message from PCP MA inquiring about when HH may be dc'ing pt.  ACM left  for [facility] requesting call back, contact information provided for ACM. Will await call back so that ACM can speak w/ pt's HHRN.

## 2024-04-13 ASSESSMENT — ENCOUNTER SYMPTOMS
SORE THROAT: 0
EYE PAIN: 0
COLOR CHANGE: 0
NAUSEA: 0
ABDOMINAL PAIN: 0
SHORTNESS OF BREATH: 0
ABDOMINAL DISTENTION: 0
BLOOD IN STOOL: 0
COUGH: 0
PHOTOPHOBIA: 0

## 2024-04-13 NOTE — PROGRESS NOTES
is alert and oriented to person, place, and time.   Psychiatric:         Mood and Affect: Mood normal.                   An electronic signature was used to authenticate this note.    --Miguel Angel Tavares MD

## 2024-04-15 ENCOUNTER — TELEPHONE (OUTPATIENT)
Dept: FAMILY MEDICINE CLINIC | Facility: CLINIC | Age: 84
End: 2024-04-15

## 2024-04-15 ENCOUNTER — CARE COORDINATION (OUTPATIENT)
Dept: CARE COORDINATION | Facility: CLINIC | Age: 84
End: 2024-04-15

## 2024-04-15 DIAGNOSIS — E11.65 UNCONTROLLED TYPE 2 DIABETES MELLITUS WITH HYPERGLYCEMIA (HCC): Primary | ICD-10-CM

## 2024-04-15 NOTE — TELEPHONE ENCOUNTER
Home health called today in regards to this patient and stated that to let you know that his blood sugars are still high after the change in his medications.    Today the reading was 200 and it has been as high as 359.    Please advise.    Thank you

## 2024-04-15 NOTE — CARE COORDINATION
RNCM attempted to reach pt, no answer left HIPAA compliant vm.  Will continue attempts to reach pt.

## 2024-04-15 NOTE — TELEPHONE ENCOUNTER
I INCREASED DETEMIR TO 20 UNITS IN AM AND 10 UNITS IN PM------is short acting insulin being used at meals?---IS DIET BEING FOLLOWED --APPT WITH ME 2 WEEKS EXTENDED---WITH LIST OF SUGARS AND FOODS

## 2024-04-19 ENCOUNTER — TELEPHONE (OUTPATIENT)
Dept: FAMILY MEDICINE CLINIC | Facility: CLINIC | Age: 84
End: 2024-04-19

## 2024-04-19 RX ORDER — BUMETANIDE 1 MG/1
1 TABLET ORAL DAILY
Qty: 30 TABLET | Refills: 3 | Status: SHIPPED | OUTPATIENT
Start: 2024-04-19

## 2024-04-19 RX ORDER — CARVEDILOL 3.12 MG/1
3.12 TABLET ORAL 2 TIMES DAILY WITH MEALS
Qty: 60 TABLET | Refills: 1 | Status: SHIPPED | OUTPATIENT
Start: 2024-04-19

## 2024-04-22 ENCOUNTER — OFFICE VISIT (OUTPATIENT)
Dept: PULMONOLOGY | Age: 84
End: 2024-04-22
Payer: MEDICARE

## 2024-04-22 ENCOUNTER — HOSPITAL ENCOUNTER (OUTPATIENT)
Dept: GENERAL RADIOLOGY | Age: 84
Discharge: HOME | End: 2024-04-25
Payer: MEDICARE

## 2024-04-22 VITALS
HEART RATE: 81 BPM | HEIGHT: 69 IN | OXYGEN SATURATION: 98 % | BODY MASS INDEX: 32.14 KG/M2 | RESPIRATION RATE: 18 BRPM | DIASTOLIC BLOOD PRESSURE: 74 MMHG | SYSTOLIC BLOOD PRESSURE: 124 MMHG | TEMPERATURE: 97.5 F | WEIGHT: 217 LBS

## 2024-04-22 DIAGNOSIS — J90 PLEURAL EFFUSION ON RIGHT: ICD-10-CM

## 2024-04-22 DIAGNOSIS — J96.01 ACUTE HYPOXIC RESPIRATORY FAILURE (HCC): ICD-10-CM

## 2024-04-22 DIAGNOSIS — Z87.891 HISTORY OF SMOKING: ICD-10-CM

## 2024-04-22 DIAGNOSIS — Z09 HOSPITAL DISCHARGE FOLLOW-UP: Primary | ICD-10-CM

## 2024-04-22 DIAGNOSIS — J90 PLEURAL EFFUSION ON LEFT: ICD-10-CM

## 2024-04-22 LAB
EXPIRATORY TIME: NORMAL
FEF 25-75% %PRED-PRE: NORMAL
FEF 25-75% PRED: NORMAL
FEF 25-75-PRE: NORMAL
FEV1 %PRED-PRE: 99 %
FEV1 PRED: 3.56 L
FEV1/FVC %PRED-PRE: NORMAL
FEV1/FVC PRED: NORMAL
FEV1/FVC: NORMAL
FEV1: 1.94 L
FVC %PRED-PRE: 39 %
FVC PRED: 4.98 L
FVC: 1.95 L
PEF %PRED-PRE: NORMAL
PEF PRED: NORMAL
PEF-PRE: NORMAL

## 2024-04-22 PROCEDURE — 1123F ACP DISCUSS/DSCN MKR DOCD: CPT | Performed by: INTERNAL MEDICINE

## 2024-04-22 PROCEDURE — 71046 X-RAY EXAM CHEST 2 VIEWS: CPT

## 2024-04-22 PROCEDURE — 1111F DSCHRG MED/CURRENT MED MERGE: CPT | Performed by: INTERNAL MEDICINE

## 2024-04-22 PROCEDURE — 94010 BREATHING CAPACITY TEST: CPT | Performed by: INTERNAL MEDICINE

## 2024-04-22 PROCEDURE — 99214 OFFICE O/P EST MOD 30 MIN: CPT | Performed by: STUDENT IN AN ORGANIZED HEALTH CARE EDUCATION/TRAINING PROGRAM

## 2024-04-22 ASSESSMENT — PULMONARY FUNCTION TESTS
FEV1: 1.94
FVC_PERCENT_PREDICTED_PRE: 39
FEV1_PERCENT_PREDICTED_PRE: 99
FVC: 1.95
FEV1_PREDICTED: 3.56
FVC_PREDICTED: 4.98

## 2024-04-22 NOTE — TELEPHONE ENCOUNTER
I sent the requested medication/product in on 19th . Please let patient know to check with their pharmacy  Thanks  Richelle HILLS

## 2024-04-22 NOTE — PROGRESS NOTES
Name:  Kiran Galvez  YOB: 1940   MRN: [id number]      Office Visit: 4/22/2024        ASSESSMENT AND PLAN:  (Medical Decision Making)    Impression: 83 y.o. male here for hospital follow up after he was admitted with a-fib RVR, S/P AVN ablation and BiV PPM placement. Required thoracentesis post procedure in hospital. Has reduced EF on last ECHO.     1. Hospital discharge follow-up  Doing well post hospitalization. No concerns. Awaiting follow up with cardiology     - Pulse oximetry, overnight  - [place] DISCHARGE MEDS RECONCILED W/ CURRENT OUTPATIENT MED LIST    2. History of smoking  Very remote history of cigar smoking, quit 42 years ago. No wheezing or significant shortness of breath. Spirometry shows more restriction than obstruction. Last CT of abdomen/pelvis in 2023 showed no abnormalities in bases of lungs. No other testing for review. Will check CXR today.     - Spirometry Without Bronchodilator    3. Pleural effusion on left  4. Pleural effusion on right  S/p thoracentesis on 2/1. Effusions due to heart failure with EF 15-20%. Will check CXR today to evaluate for any residual fluid. He is on bumex 1 mg daily; will follow up with cardiology soon.     - Spirometry Without Bronchodilator  - Pulse oximetry, overnight    5. Acute hypoxic respiratory failure (HCC)  Was discharged from hospital on 2 lpm continuous O2. Today he had no desaturations while walking on room air. Will check KLAUS on room air to determine if he has nocturnal hypoxemia. Will discontinue O2 if testing is normal.     Resting O2 saturations were 100% on RA.  While walking, lowest O2 reading was 94% and highest HR was 97.    - Pulse oximetry, overnight    No orders of the defined types were placed in this encounter.        Procedures    [place] DISCHARGE MEDS RECONCILED W/ CURRENT OUTPATIENT MED LIST     Follow-up and Dispositions    Return in about 6 months (around 10/22/2024) for Haritha olivo MD; effusion.       BROOKLYN Lemus -

## 2024-04-25 ENCOUNTER — TELEPHONE (OUTPATIENT)
Dept: PULMONOLOGY | Age: 84
End: 2024-04-25

## 2024-04-25 NOTE — TELEPHONE ENCOUNTER
Patient is calling concerning the order for KLAUS.  Asking where the order was sent.  Also wants to know if he can start back driving

## 2024-04-25 NOTE — TELEPHONE ENCOUNTER
Tried to contact patient but no answer voicemail is not set up. KLAUS ordered to [facility] through [employer]. Ryan GANDARA

## 2024-04-29 ENCOUNTER — TELEPHONE (OUTPATIENT)
Dept: PULMONOLOGY | Age: 84
End: 2024-04-29

## 2024-04-29 NOTE — TELEPHONE ENCOUNTER
Spoke with the patient to let him know KLAUS order forwarded to [facility]. As per Haritha Miller pt can  all his cardiologist if he is able to drive. Patient voices understanding. Ryan GANDARA

## 2024-05-01 ENCOUNTER — CARE COORDINATION (OUTPATIENT)
Dept: CARE COORDINATION | Facility: CLINIC | Age: 84
End: 2024-05-01

## 2024-05-03 ENCOUNTER — OFFICE VISIT (OUTPATIENT)
Dept: FAMILY MEDICINE CLINIC | Facility: CLINIC | Age: 84
End: 2024-05-03
Payer: MEDICARE

## 2024-05-03 VITALS
HEART RATE: 79 BPM | BODY MASS INDEX: 32.96 KG/M2 | TEMPERATURE: 97.8 F | WEIGHT: 210 LBS | HEIGHT: 67 IN | SYSTOLIC BLOOD PRESSURE: 110 MMHG | DIASTOLIC BLOOD PRESSURE: 76 MMHG | OXYGEN SATURATION: 94 %

## 2024-05-03 DIAGNOSIS — E11.65 UNCONTROLLED TYPE 2 DIABETES MELLITUS WITH HYPERGLYCEMIA (HCC): ICD-10-CM

## 2024-05-03 DIAGNOSIS — E11.69 TYPE 2 DIABETES MELLITUS WITH OTHER SPECIFIED COMPLICATION, WITH LONG-TERM CURRENT USE OF INSULIN (HCC): Primary | ICD-10-CM

## 2024-05-03 DIAGNOSIS — Z79.4 TYPE 2 DIABETES MELLITUS WITH OTHER SPECIFIED COMPLICATION, WITH LONG-TERM CURRENT USE OF INSULIN (HCC): Primary | ICD-10-CM

## 2024-05-03 PROCEDURE — 3046F HEMOGLOBIN A1C LEVEL >9.0%: CPT | Performed by: PHYSICIAN ASSISTANT

## 2024-05-03 PROCEDURE — 1123F ACP DISCUSS/DSCN MKR DOCD: CPT | Performed by: PHYSICIAN ASSISTANT

## 2024-05-03 PROCEDURE — 99213 OFFICE O/P EST LOW 20 MIN: CPT | Performed by: PHYSICIAN ASSISTANT

## 2024-05-03 ASSESSMENT — PATIENT HEALTH QUESTIONNAIRE - PHQ9
SUM OF ALL RESPONSES TO PHQ QUESTIONS 1-9: 0
SUM OF ALL RESPONSES TO PHQ QUESTIONS 1-9: 0
SUM OF ALL RESPONSES TO PHQ9 QUESTIONS 1 & 2: 0
2. FEELING DOWN, DEPRESSED OR HOPELESS: NOT AT ALL
1. LITTLE INTEREST OR PLEASURE IN DOING THINGS: NOT AT ALL
SUM OF ALL RESPONSES TO PHQ QUESTIONS 1-9: 0
SUM OF ALL RESPONSES TO PHQ QUESTIONS 1-9: 0

## 2024-05-03 ASSESSMENT — ENCOUNTER SYMPTOMS
COUGH: 0
SHORTNESS OF BREATH: 0

## 2024-05-03 NOTE — PROGRESS NOTES
[facility]  [address] [place]   [place] [address]  Phone: [phone]  Fax [phone]  Provider : Richelle Lin PA-C      Patient: Kiran Galvez  YOB: 1940  Patient Age 83 y.o.  Patient sex: male  Medical Record:  [id number]  Visit Date:5/3/2024   Author:  Richelle Lin PA-C    Family Practice Clinic Note     Chief complaint  Kiran Galvez  is a 83 y.o. male who was seen on 05/03/24  12:50 PM  for the following reasons:    Chief Complaint   Patient presents with    Follow-up       Current Medical problems addressed  Mr Galvez typically sees Dr Tavares and returns for 2 week follow up  Diabetes - type 2  -on insulin- he was confused and only taking levemir once a day  and is prescribed twice a day.  I reviewed his home readings and diet and sugars in the 300s and 501 and mostly high 200s and 300s.  He had one reading at 99 and was afraid that was low.  I advised protein every 4-6 hours and to avoid OJ which he is drinking daily and start bid dose and bid screening and more frequently if he feels too low  Lab Results   Component Value Date    LABA1C 9.3 (H) 04/11/2024    LABA1C 7.2 (H) 01/03/2024    LABA1C 8.8 (H) 09/27/2023     Lab Results   Component Value Date    CREATININE 1.70 (H) 04/11/2024         ASSESSMENT AND PLAN    ICD-10-CM    1. Type 2 diabetes mellitus with other specified complication, with long-term current use of insulin (HCC)  E11.69     Z79.4       2. Uncontrolled type 2 diabetes mellitus with hyperglycemia (HCC)  E11.65          Kiran was seen today for follow-up.    Diagnoses and all orders for this visit:    Type 2 diabetes mellitus with other specified complication, with long-term current use of insulin (HCC)    Uncontrolled type 2 diabetes mellitus with hyperglycemia (HCC)      Plan includes the following:  Discussed hypoglycemia starts at 70 and how to treat it and advised he should be taking levemir bid 25 un am and 10 in evening and given form to fill

## 2024-05-08 ENCOUNTER — TELEPHONE (OUTPATIENT)
Dept: PULMONOLOGY | Age: 84
End: 2024-05-08

## 2024-05-08 DIAGNOSIS — J90 PLEURAL EFFUSION ON RIGHT: ICD-10-CM

## 2024-05-08 DIAGNOSIS — J96.01 ACUTE HYPOXIC RESPIRATORY FAILURE (HCC): ICD-10-CM

## 2024-05-08 DIAGNOSIS — J90 PLEURAL EFFUSION ON LEFT: ICD-10-CM

## 2024-05-08 DIAGNOSIS — Z09 HOSPITAL DISCHARGE FOLLOW-UP: Primary | ICD-10-CM

## 2024-05-08 NOTE — TELEPHONE ENCOUNTER
Patient say that [facility] says it may be a month before they have an KLAUS test to send out to him. Patient is little upset says that he may just send the cpap back and not do all this . Please contact patient

## 2024-05-08 NOTE — TELEPHONE ENCOUNTER
Contacted [facility] regarding KLAUS order for the pt , they stated they have a shortage of the KLAUS units it will take couple of weeks to do the test.  Contacted patient and I spoke with the daughter Ms. Prieto regarding KLAUS status, she stated they cannot  wait for that long and they want to do the test ASAP.   Ms. Haritha Miller advice if the patient willing to change the DME company to [employer] then we can make another order and have a test this week.  Ms. Prieto (patient's daughter) is willing to change the DME from [employer] to [employer].   Order of KLAUS and O2 @night placed to [employer].  Patient voices understanding. Ryan GANDARA

## 2024-05-09 ENCOUNTER — CARE COORDINATION (OUTPATIENT)
Dept: CARE COORDINATION | Facility: CLINIC | Age: 84
End: 2024-05-09

## 2024-05-09 NOTE — TELEPHONE ENCOUNTER
Received message from [employer] stated that the family refused to do the KLAUS and O2 from [employer]. Ryan CHENGA

## 2024-05-09 NOTE — CARE COORDINATION
Ambulatory Care Coordination Note  2024    Patient Current Location:  Home: [address]  [place] [address]     ACM contacted the family by telephone. Verified name and  with family as identifiers. Provided introduction to self, and explanation of the ACM role.     Challenges to be reviewed by the provider   Additional needs identified to be addressed with provider: No  none               Method of communication with provider: none.    ACM: Yeni Olsen, RN    ACM spoke w/ dgtr regarding PCP visit, reviewed instructions for taking levemir, eating protein every 4-6hrs.  Pt drinking OJ when BS at 200s, which ACM discouraged.  Discussed A1c results, preventing chronic complications. She assist w/ educating pt who is [place]. ACM scheduled f/u in 2wks.     Care Coordination Interventions    Referral from Primary Care Provider: No  Suggested Interventions and Community Resources          Goals Addressed    None         Future Appointments   Date Time Provider Department Center   5/15/2024  2:00 PM [facility] DEVICE 39 [facility] AMB   5/15/2024  2:30 PM Dakota Howard, APRN - CNP [facility] AMB   2024  2:20 PM Richelle Lin PATiffanieC D[facility] AMB   2024 10:45 AM Miguel Angel Tavares MD [facility] AMB

## 2024-05-10 ENCOUNTER — TELEPHONE (OUTPATIENT)
Dept: FAMILY MEDICINE CLINIC | Facility: CLINIC | Age: 84
End: 2024-05-10

## 2024-05-10 DIAGNOSIS — E11.9 TYPE 2 DIABETES MELLITUS WITHOUT COMPLICATION, WITH LONG-TERM CURRENT USE OF INSULIN (HCC): ICD-10-CM

## 2024-05-10 DIAGNOSIS — Z79.4 TYPE 2 DIABETES MELLITUS WITHOUT COMPLICATION, WITH LONG-TERM CURRENT USE OF INSULIN (HCC): ICD-10-CM

## 2024-05-10 DIAGNOSIS — Z79.4 TYPE 2 DIABETES MELLITUS WITH OTHER SPECIFIED COMPLICATION, WITH LONG-TERM CURRENT USE OF INSULIN (HCC): Primary | ICD-10-CM

## 2024-05-10 DIAGNOSIS — E11.69 TYPE 2 DIABETES MELLITUS WITH OTHER SPECIFIED COMPLICATION, WITH LONG-TERM CURRENT USE OF INSULIN (HCC): Primary | ICD-10-CM

## 2024-05-10 RX ORDER — ACYCLOVIR 400 MG/1
TABLET ORAL
Qty: 3 EACH | Refills: 3 | Status: SHIPPED | OUTPATIENT
Start: 2024-05-10

## 2024-05-10 NOTE — TELEPHONE ENCOUNTER
with [facility] called stating they are not in network and pt can get his glucose monitor at his pharmacy.

## 2024-05-10 NOTE — PROCEDURE: MOHS SURGERY
Concerned about hormone changes. Hormone panel obtained today. Also with continued irregular bleeding/spotting with normal TVUS from last year. RTC 3 weeks to discuss results and treatment options.    Composite Graft Text: The defect edges were debeveled with a #15 scalpel blade.  Given the location of the defect, shape of the defect, the proximity to free margins and the fact the defect was full thickness a composite graft was deemed most appropriate.  The defect was outline and then transferred to the donor site.  A full thickness graft was then excised from the donor site. The graft was then placed in the primary defect, oriented appropriately and then sutured into place.  The secondary defect was then repaired using a primary closure.

## 2024-05-13 DIAGNOSIS — E11.69 TYPE 2 DIABETES MELLITUS WITH OTHER SPECIFIED COMPLICATION, WITH LONG-TERM CURRENT USE OF INSULIN (HCC): Primary | ICD-10-CM

## 2024-05-13 DIAGNOSIS — Z79.4 TYPE 2 DIABETES MELLITUS WITH OTHER SPECIFIED COMPLICATION, WITH LONG-TERM CURRENT USE OF INSULIN (HCC): Primary | ICD-10-CM

## 2024-05-13 RX ORDER — ACYCLOVIR 400 MG/1
TABLET ORAL
Qty: 9 EACH | Refills: 3 | Status: SHIPPED | OUTPATIENT
Start: 2024-05-13

## 2024-05-15 ENCOUNTER — TELEPHONE (OUTPATIENT)
Dept: PULMONOLOGY | Age: 84
End: 2024-05-15

## 2024-05-15 ENCOUNTER — OFFICE VISIT (OUTPATIENT)
Age: 84
End: 2024-05-15
Payer: MEDICARE

## 2024-05-15 ENCOUNTER — NURSE ONLY (OUTPATIENT)
Age: 84
End: 2024-05-15
Payer: MEDICARE

## 2024-05-15 VITALS
DIASTOLIC BLOOD PRESSURE: 70 MMHG | BODY MASS INDEX: 34.21 KG/M2 | HEIGHT: 67 IN | HEART RATE: 76 BPM | WEIGHT: 218 LBS | SYSTOLIC BLOOD PRESSURE: 132 MMHG

## 2024-05-15 DIAGNOSIS — I44.2 CHB (COMPLETE HEART BLOCK) (HCC): Primary | ICD-10-CM

## 2024-05-15 DIAGNOSIS — I42.0 DILATED CARDIOMYOPATHY (HCC): ICD-10-CM

## 2024-05-15 DIAGNOSIS — N18.32 STAGE 3B CHRONIC KIDNEY DISEASE (HCC): ICD-10-CM

## 2024-05-15 DIAGNOSIS — I48.21 PERMANENT ATRIAL FIBRILLATION (HCC): Primary | ICD-10-CM

## 2024-05-15 PROCEDURE — 99214 OFFICE O/P EST MOD 30 MIN: CPT | Performed by: NURSE PRACTITIONER

## 2024-05-15 PROCEDURE — 1123F ACP DISCUSS/DSCN MKR DOCD: CPT | Performed by: NURSE PRACTITIONER

## 2024-05-15 NOTE — TELEPHONE ENCOUNTER
Patient is asking for results of KLAUS/sleep study. He is not sure which. Please call him at [phone]

## 2024-05-15 NOTE — TELEPHONE ENCOUNTER
Spoke to patient and he states he had the KLAUS last week through [facility] and was told report was already sent to office.  Informed him we have not received results and will call him once we get results and he voices understanding.    Called [facility] and left message with Cam to call back to make sure patient has had testing and to get results.

## 2024-05-15 NOTE — PROGRESS NOTES
[facility], [address], [address]  [place] [address]  PHONE: [phone]  1940    SUBJECTIVE:   Kiran Galvez is a 83 y.o. male seen for a follow up visit regarding the following:     Chief Complaint   Patient presents with    3 Month Follow-Up    Coronary Artery Disease    Atrial Fibrillation       HPI:    Kiran Galvez is a very pleasant 83 y.o. male with a past medical and cardiac history significant for permanent afib,  CKD, PAD, pancreatic mass, obesity, bladder cancer.  He was seen during hospitalization in January and the decision was made to to proceed with BIV PPM and AVN ablation.  He returns for 3 month follow up.  He reports feeling well.  Denies chest pain, shortness of breath, dizziness or syncope.     Device interrogation:   Normal Device Function  Battery: JASPER, 11 years  Sensing, impedance and thresholds reviewed and tested  Presenting Rhythm: was reviewed  Underlying Rhythm: BiV paced  Heart Rate Histograms reviewed  Pacing and Detection Parameters were evaluated  Left ventricular lead demonstrated loss of capture  Measured value 1.7V@2ms  Programmed setting 1.7V@2m  BIV pacing 98%      Cardiac PMH: (Old records have been reviewed and summarized below)    Reviewed office note   TTE 1/31/2024 EF 15-20%    Past Medical History, Past Surgical History, Family history, Social History, and Medications were all reviewed with the patient today and updated as necessary.     Current Outpatient Medications   Medication Sig Dispense Refill    bumetanide (BUMEX) 1 MG tablet Take 1 tablet by mouth daily 30 tablet 3    carvedilol (COREG) 3.125 MG tablet Take 1 tablet by mouth 2 times daily (with meals) 60 tablet 1    insulin detemir (LEVEMIR FLEXTOUCH) 100 UNIT/ML injection pen 20 UNITS IN AM-----10 UNITS IN PM 5 Adjustable Dose Pre-filled Pen Syringe 3    insulin detemir (LEVEMIR FLEXTOUCH) 100 UNIT/ML injection pen Inject 20 Units into the skin nightly 5 Adjustable Dose Pre-filled

## 2024-05-16 PROCEDURE — 93280 PM DEVICE PROGR EVAL DUAL: CPT | Performed by: INTERNAL MEDICINE

## 2024-05-20 RX ORDER — CARVEDILOL 3.12 MG/1
3.12 TABLET ORAL 2 TIMES DAILY WITH MEALS
Qty: 60 TABLET | Refills: 1 | Status: SHIPPED | OUTPATIENT
Start: 2024-05-20

## 2024-05-20 NOTE — PROCEDURE: BIOPSY BY SHAVE METHOD WITH FROZEN SECTION
Patient: Merced Vicente    Procedure Information       Date/Time: 05/20/24 0730    Procedures:       RIGHT FOOT URBINA CALCANEAL OSTEOTOMY WITH C-ARM (Right) - Roel's Calcaneal osteotomy right foot      COTTON OSTEOTOMY (Right) - cotton osteotomy right foot      RIGHT LEG GASTROCNEMIUS RECESSION (Right) - Gastrocnemius Recession right leg      RIGHT ANKLE POSTERIOR TIBIALIS TENDON REPAIR (Right) - PT tendon repair right ankle      RIGHT LEG BONE MARROW ASPIRATE HARVEST (Right) - [place] of BMA right leg    Location: [facility] OR 02 / Virtual [facility] OR    Surgeons: Duane J Ehredt, DPM            Relevant Problems   Anesthesia (within normal limits)      Endocrine   (+) Class 3 severe obesity in adult (Multi)       Clinical information reviewed:   Tobacco  Allergies  Meds  Problems  Med Hx  Surg Hx  OB Status    Fam Hx  Soc Hx        NPO Detail:  NPO/Void Status  Date of Last Liquid: 05/20/24  Time of Last Liquid: 0000  Date of Last Solid: 05/20/24  Time of Last Solid: 0000         Physical Exam    Airway  Mallampati: II  TM distance: >3 FB  Neck ROM: full     Cardiovascular - normal exam  Rhythm: regular  Rate: normal     Dental - normal exam     Pulmonary - normal exam     Abdominal            Anesthesia Plan    History of general anesthesia?: no  History of complications of general anesthesia?: no    ASA 3     general and regional     The patient is not a current smoker.    intravenous induction   Postoperative administration of opioids is intended.  Trial extubation is planned.  Anesthetic plan and risks discussed with patient.  Use of blood products discussed with patient who consented to blood products.    Plan discussed with CRNA.       Detail Level: Detailed

## 2024-05-21 ENCOUNTER — TELEPHONE (OUTPATIENT)
Dept: PULMONOLOGY | Age: 84
End: 2024-05-21

## 2024-05-21 NOTE — TELEPHONE ENCOUNTER
----- Message from BROOKLYN Lemus NP sent at 5/15/2024  2:52 PM EDT -----  Please notify Mr. Galvez that he needs to continue wearing oxygen with sleep at 3 liters flow due to his KLAUS showing 2 hours of desaturations less than 90% with the lowest saturation being 52%.     Thank you,   BROOKLYN Lemus NP

## 2024-05-22 ENCOUNTER — CARE COORDINATION (OUTPATIENT)
Dept: CARE COORDINATION | Facility: CLINIC | Age: 84
End: 2024-05-22

## 2024-05-22 DIAGNOSIS — E78.2 MIXED HYPERLIPIDEMIA: ICD-10-CM

## 2024-05-22 RX ORDER — ROSUVASTATIN CALCIUM 10 MG/1
10 TABLET, COATED ORAL DAILY
Qty: 30 TABLET | Refills: 3 | Status: SHIPPED | OUTPATIENT
Start: 2024-05-22

## 2024-05-22 NOTE — CARE COORDINATION
Ambulatory Care Coordination Note  2024    Patient Current Location:  Home: [address]  [place] [address]     ACM contacted the patient by telephone. Verified name and  with patient as identifiers. Provided introduction to self, and explanation of the ACM role.     Challenges to be reviewed by the provider   Additional needs identified to be addressed with provider: No  none               Method of communication with provider: none.    ACM: Yeni Olsen, RN    Spoke w/ pt and dgtr. Discussed pulm visit, need for O2 3ltrs at night d/t significant drops in sats. Pt and dgtr verbalize understanding. Also discussed DM educ/mgmt.  Pt trying to incorporate more protein into diet. BS over 300 after eating icecream. Will continue to follow for DM educ.      Pt is selling home and will be transitioning to [place] w/ dgtr, hopefully before .   Care Coordination Interventions    Referral from Primary Care Provider: No  Suggested Interventions and Community Resources          Goals Addressed                   This Visit's Progress     Conditions and Symptoms   On track     I will schedule office visits, as directed by my provider.  I will keep my appointment or reschedule if I have to cancel.  I will notify my provider of any barriers to my plan of care.  I will follow my Zone Management tool to seek urgent or emergent care.  I will notify my provider of any symptoms that indicate a worsening of my condition.    Barriers: overwhelmed by complexity of regimen  Plan for overcoming my barriers: ACM care coordination w/ multiple providers and educ  Confidence: 9/10  Anticipated Goal Completion Date: 24                Future Appointments   Date Time Provider Department Center   2024  2:20 PM Richelle Lin PA-C [facility] AMB   2024 10:45 AM Miguel Angel Tavares MD [facility] AMB   2024  2:30 PM Ilan Montana MD [facility] AMB

## 2024-06-05 ENCOUNTER — OFFICE VISIT (OUTPATIENT)
Dept: FAMILY MEDICINE CLINIC | Facility: CLINIC | Age: 84
End: 2024-06-05
Payer: MEDICARE

## 2024-06-05 VITALS
TEMPERATURE: 97.6 F | BODY MASS INDEX: 32.65 KG/M2 | SYSTOLIC BLOOD PRESSURE: 140 MMHG | DIASTOLIC BLOOD PRESSURE: 82 MMHG | HEIGHT: 67 IN | HEART RATE: 79 BPM | WEIGHT: 208 LBS | OXYGEN SATURATION: 98 %

## 2024-06-05 DIAGNOSIS — E78.2 MIXED HYPERLIPIDEMIA: ICD-10-CM

## 2024-06-05 DIAGNOSIS — K59.04 CHRONIC IDIOPATHIC CONSTIPATION: ICD-10-CM

## 2024-06-05 DIAGNOSIS — E11.65 UNCONTROLLED TYPE 2 DIABETES MELLITUS WITH HYPERGLYCEMIA (HCC): Primary | ICD-10-CM

## 2024-06-05 PROCEDURE — 1123F ACP DISCUSS/DSCN MKR DOCD: CPT | Performed by: PHYSICIAN ASSISTANT

## 2024-06-05 PROCEDURE — 99214 OFFICE O/P EST MOD 30 MIN: CPT | Performed by: PHYSICIAN ASSISTANT

## 2024-06-05 PROCEDURE — 3046F HEMOGLOBIN A1C LEVEL >9.0%: CPT | Performed by: PHYSICIAN ASSISTANT

## 2024-06-05 RX ORDER — BUMETANIDE 1 MG/1
1 TABLET ORAL DAILY
Qty: 30 TABLET | Refills: 3 | Status: SHIPPED | OUTPATIENT
Start: 2024-06-05

## 2024-06-05 RX ORDER — ROSUVASTATIN CALCIUM 10 MG/1
10 TABLET, COATED ORAL DAILY
Qty: 30 TABLET | Refills: 3 | Status: SHIPPED | OUTPATIENT
Start: 2024-06-05

## 2024-06-05 RX ORDER — RIVAROXABAN 15 MG/1
15 TABLET, FILM COATED ORAL
Qty: 90 TABLET | Refills: 3 | Status: SHIPPED | OUTPATIENT
Start: 2024-06-05

## 2024-06-05 RX ORDER — INSULIN ASPART 100 [IU]/ML
INJECTION, SOLUTION INTRAVENOUS; SUBCUTANEOUS
Qty: 5 ADJUSTABLE DOSE PRE-FILLED PEN SYRINGE | Refills: 3 | Status: SHIPPED | OUTPATIENT
Start: 2024-06-05

## 2024-06-05 NOTE — PROGRESS NOTES
Topics    Alcohol use: Not Currently       Family History: History reviewed. No pertinent family history.    Surgical History:  Past Surgical History:   Procedure Laterality Date    BLADDER SURGERY      CHOLECYSTECTOMY      EP DEVICE PROCEDURE N/A 1/31/2024    Insert PPM BiV performed by Ilan Montana MD at [facility] CARDIAC CATH LAB    EP DEVICE PROCEDURE N/A 1/31/2024    Lv lead placement performed by Ilan Montana MD at [facility] CARDIAC CATH LAB    EP DEVICE PROCEDURE N/A 1/31/2024    Ablation AV node performed by Ilan Montana MD at [facility] CARDIAC CATH LAB    SKIN CANCER EXCISION      TONSILLECTOMY         ROS  Review of Systems   Constitutional:  Negative for fever.   Eyes:  Negative for visual disturbance.   Respiratory:  Negative for cough and shortness of breath.    Cardiovascular:  Negative for chest pain.   Gastrointestinal:  Negative for blood in stool.   Musculoskeletal:  Negative for myalgias.   Skin:  Negative for rash.   Neurological:  Negative for syncope and weakness.   Psychiatric/Behavioral:  Negative for dysphoric mood.        BP (!) 140/82   Pulse 79   Temp 97.6 °F (36.4 °C)   Ht 1.702 m (5' 7\")   Wt 94.3 kg (208 lb)   SpO2 98%   BMI 32.58 kg/m²   Body mass index is 32.58 kg/m².     Physical Exam    Physical Exam  Vitals and nursing note reviewed.   Constitutional:       Appearance: Normal appearance.   Eyes:      Conjunctiva/sclera: Conjunctivae normal.   Cardiovascular:      Rate and Rhythm: Normal rate and regular rhythm.   Pulmonary:      Effort: Pulmonary effort is normal.      Breath sounds: Normal breath sounds.   Musculoskeletal:      Cervical back: Neck supple.      Right lower leg: No edema.      Left lower leg: No edema.   Neurological:      Mental Status: He is alert.   Psychiatric:         Mood and Affect: Mood normal.        Diabetic foot exam:   Left Foot:   Visual Exam: has peeling of skin from knees down and dusky from mid foot to toes    Pulse DP: 1+

## 2024-06-10 ASSESSMENT — ENCOUNTER SYMPTOMS
COUGH: 0
SHORTNESS OF BREATH: 0
BLOOD IN STOOL: 0

## 2024-06-12 ENCOUNTER — CARE COORDINATION (OUTPATIENT)
Dept: CARE COORDINATION | Facility: CLINIC | Age: 84
End: 2024-06-12

## 2024-06-12 ENCOUNTER — TELEPHONE (OUTPATIENT)
Dept: FAMILY MEDICINE CLINIC | Facility: CLINIC | Age: 84
End: 2024-06-12

## 2024-06-12 ENCOUNTER — TELEPHONE (OUTPATIENT)
Dept: PULMONOLOGY | Age: 84
End: 2024-06-12

## 2024-06-12 NOTE — TELEPHONE ENCOUNTER
Beau with [facility]     [phone]      He is being discarging from nursing , blood sugar are still elevated. 200-300 in the last days,      Fasting today 136. But that is the best it has been.       DAVE

## 2024-06-12 NOTE — CARE COORDINATION
Ambulatory Care Coordination Note     2024 3:28 PM     Patient Current Location:  Home: [address]  [facility] [address]     ACM contacted the family, dgtr  by telephone. Verified name and  with family as identifiers.         ACM: Yeni Olsen RN     Challenges to be reviewed by the provider   Additional needs identified to be addressed with provider No  none               Method of communication with provider: none.    Care Summary Note:   Per dgtr, pt still trying to sell house in prep for transition home to [place], requests Portable O2 [employer]. Pt is on 2ltrs during day and 3ltrs qhs. HH is dc'ing, is continuing to educate regarding DM diet.     ACM called PP to notify of request for Inogen.     Offered patient enrollment in the Remote Patient Monitoring (RPM) program for in-home monitoring: Yes, but did not enroll at this time: pt transitioning to [place] soon .     Assessments Completed:   No changes since last call    Medications Reviewed:   Patient denies any changes with medications and reports taking all medications as prescribed.    Advance Care Planning:   Health Care Decision maker confirmed     Care Planning:   Education Documentation  No documentation found.  Education Comments  No comments found.         PCP/Specialist follow up:   Future Appointments         Provider Specialty Dept Phone    2024 11:00 AM Miguel Angel Tavares MD Family Medicine [phone]    2024 2:30 PM Ilan Montana MD Cardiology [phone]            Follow Up:   Plan for next ACM outreach in approximately 1 week to complete:  - care coordination of portable O2 Charissa .   caregiver is agreeable to this plan.

## 2024-06-17 NOTE — TELEPHONE ENCOUNTER
Called and spoke with the patient regarding her Inogen POC, pt stated he wants to order for this. Order sent to [employer] through fax. I explain to the pt his KLAUS result as per Ms. Haritha Miller  he needs to continue wearing oxygen with sleep at 3 liters flow due to his KLAUS showing 2 hours of desaturations less than 90% with the lowest saturation being 52%. Patient voices understanding. Ryan GANDARA

## 2024-06-18 NOTE — TELEPHONE ENCOUNTER
Tried contacted patient regarding his POC order inogen. But no answer LVM. Patient need to come to the office for the O2 walk evaluation. Ryan GANDARA

## 2024-06-19 ENCOUNTER — OFFICE VISIT (OUTPATIENT)
Dept: FAMILY MEDICINE CLINIC | Facility: CLINIC | Age: 84
End: 2024-06-19
Payer: MEDICARE

## 2024-06-19 VITALS
SYSTOLIC BLOOD PRESSURE: 120 MMHG | HEART RATE: 80 BPM | WEIGHT: 224 LBS | HEIGHT: 67 IN | DIASTOLIC BLOOD PRESSURE: 70 MMHG | TEMPERATURE: 98.3 F | OXYGEN SATURATION: 97 % | BODY MASS INDEX: 35.16 KG/M2

## 2024-06-19 DIAGNOSIS — I48.91 ATRIAL FIBRILLATION, UNSPECIFIED TYPE (HCC): ICD-10-CM

## 2024-06-19 DIAGNOSIS — I50.22 CHRONIC SYSTOLIC CONGESTIVE HEART FAILURE (HCC): Primary | ICD-10-CM

## 2024-06-19 DIAGNOSIS — E11.65 UNCONTROLLED TYPE 2 DIABETES MELLITUS WITH HYPERGLYCEMIA (HCC): ICD-10-CM

## 2024-06-19 DIAGNOSIS — J90 CHRONIC BILATERAL PLEURAL EFFUSIONS: ICD-10-CM

## 2024-06-19 DIAGNOSIS — E78.2 MIXED HYPERLIPIDEMIA: ICD-10-CM

## 2024-06-19 DIAGNOSIS — I10 PRIMARY HYPERTENSION: ICD-10-CM

## 2024-06-19 LAB
ANION GAP SERPL CALC-SCNC: 8 MMOL/L (ref 9–18)
BUN SERPL-MCNC: 29 MG/DL (ref 8–23)
CALCIUM SERPL-MCNC: 8.6 MG/DL (ref 8.8–10.2)
CHLORIDE SERPL-SCNC: 102 MMOL/L (ref 98–107)
CO2 SERPL-SCNC: 28 MMOL/L (ref 20–28)
CREAT SERPL-MCNC: 1.59 MG/DL (ref 0.8–1.3)
EST. AVERAGE GLUCOSE BLD GHB EST-MCNC: 222 MG/DL
GLUCOSE SERPL-MCNC: 206 MG/DL (ref 70–99)
HBA1C MFR BLD: 9.4 % (ref 0–5.6)
POTASSIUM SERPL-SCNC: 4.5 MMOL/L (ref 3.5–5.1)
SODIUM SERPL-SCNC: 139 MMOL/L (ref 136–145)

## 2024-06-19 PROCEDURE — 3046F HEMOGLOBIN A1C LEVEL >9.0%: CPT | Performed by: FAMILY MEDICINE

## 2024-06-19 PROCEDURE — 1123F ACP DISCUSS/DSCN MKR DOCD: CPT | Performed by: FAMILY MEDICINE

## 2024-06-19 PROCEDURE — 99214 OFFICE O/P EST MOD 30 MIN: CPT | Performed by: FAMILY MEDICINE

## 2024-06-19 PROCEDURE — 3078F DIAST BP <80 MM HG: CPT | Performed by: FAMILY MEDICINE

## 2024-06-19 PROCEDURE — 3074F SYST BP LT 130 MM HG: CPT | Performed by: FAMILY MEDICINE

## 2024-06-19 RX ORDER — ROSUVASTATIN CALCIUM 10 MG/1
10 TABLET, COATED ORAL DAILY
Qty: 90 TABLET | Refills: 3 | Status: SHIPPED | OUTPATIENT
Start: 2024-06-19

## 2024-06-19 RX ORDER — BUMETANIDE 1 MG/1
1 TABLET ORAL DAILY
Qty: 90 TABLET | Refills: 3 | Status: SHIPPED | OUTPATIENT
Start: 2024-06-19

## 2024-06-19 RX ORDER — RIVAROXABAN 15 MG/1
15 TABLET, FILM COATED ORAL
Qty: 90 TABLET | Refills: 3 | Status: SHIPPED | OUTPATIENT
Start: 2024-06-19

## 2024-06-19 RX ORDER — CARVEDILOL 3.12 MG/1
3.12 TABLET ORAL 2 TIMES DAILY WITH MEALS
Qty: 180 TABLET | Refills: 3 | Status: SHIPPED | OUTPATIENT
Start: 2024-06-19

## 2024-06-21 ENCOUNTER — TELEPHONE (OUTPATIENT)
Dept: PULMONOLOGY | Age: 84
End: 2024-06-21

## 2024-06-21 ASSESSMENT — ENCOUNTER SYMPTOMS
COLOR CHANGE: 0
SORE THROAT: 0
NAUSEA: 0
EYE PAIN: 0
SHORTNESS OF BREATH: 0
BLURRED VISION: 0
ABDOMINAL PAIN: 0
BLOOD IN STOOL: 0
ABDOMINAL DISTENTION: 0
PHOTOPHOBIA: 0
COUGH: 0

## 2024-06-21 NOTE — TELEPHONE ENCOUNTER
Pt states he needs portable oxygen concentrator. Must be national dealer .     Please call to discuss. He will be traveling next week. Needs to establish prior to leaving.

## 2024-06-21 NOTE — TELEPHONE ENCOUNTER
Called patient regarding her POC order. Explain to him he need to come to the office and do the walk test . Patient decline to this bcoz [place]. He ask for the DME phone number I give [facility] #. Patient voices understanding. Ryan GANDARA

## 2024-06-21 NOTE — PROGRESS NOTES
Kiran Galvez  1940 is a 83 y.o. male ,Established patient, here for evaluation of the following chief complaint(s):   Chief Complaint   Patient presents with    Follow-up     8 week- did have a moles removed and has 3 stiches in each one a total of 9 if you would look at them and see if they are ready to be removed or not I can take them out-           1. Chronic systolic congestive heart failure (HCC)  -     bumetanide (BUMEX) 1 MG tablet; Take 1 tablet by mouth daily, Disp-90 tablet, R-3Normal  -     rosuvastatin (CRESTOR) 10 MG tablet; Take 1 tablet by mouth daily, Disp-90 tablet, R-3Normal  2. Mixed hyperlipidemia  -     rosuvastatin (CRESTOR) 10 MG tablet; Take 1 tablet by mouth daily, Disp-90 tablet, R-3Normal  3. Primary hypertension  -     carvedilol (COREG) 3.125 MG tablet; Take 1 tablet by mouth 2 times daily (with meals) with meals, Disp-180 tablet, R-3Normal  -     rosuvastatin (CRESTOR) 10 MG tablet; Take 1 tablet by mouth daily, Disp-90 tablet, R-3Normal  4. Atrial fibrillation, unspecified type (HCC)  -     XARELTO 15 MG TABS tablet; Take 1 tablet by mouth daily (with breakfast), Disp-90 tablet, R-3, DAWNormal  5. Chronic bilateral pleural effusions  6. Uncontrolled type 2 diabetes mellitus with hyperglycemia (HCC)  -     Basic Metabolic Panel; Future  -     Hemoglobin A1C; Future    HE IS ADVISED TO HAVE DERMATOLOGIST TO REMOVE STITCHES.    Return in about 3 months (around 9/19/2024).        Subjective   SUBJECTIVE/OBJECTIVE:  He recently had Moh's surgery---needs stitches removed.    Congestive Heart Failure  Presents for follow-up visit. Pertinent negatives include no abdominal pain, chest pain, palpitations or shortness of breath. The symptoms have been stable.   Hypertension  This is a chronic problem. The current episode started more than 1 year ago. The problem has been gradually improving since onset. The problem is controlled. Pertinent negatives include no anxiety, blurred vision,

## 2024-06-25 ENCOUNTER — CARE COORDINATION (OUTPATIENT)
Dept: CARE COORDINATION | Facility: CLINIC | Age: 84
End: 2024-06-25

## 2024-06-25 NOTE — CARE COORDINATION
Ambulatory Care Coordination Note     2024 3:42 PM     Patient Current Location:  Home: [address]  [facility] [address]     ACM contacted the patient by telephone. Verified name and  with family as identifiers.         ACM: Yeni Olsen RN     Challenges to be reviewed by the provider   Additional needs identified to be addressed with provider No  none               Method of communication with provider: none.    Care Summary Note:       Discussed ED JO for urinary retention. Pt w/ cath, f/u w/ urol tomorrow for removal. Discussed sxs UTI w/ dgtr. Pt is planning move to [place] w/ her over the weekend if cath if able to be removed tomorrow. Pt has all meds filled. Pt will f/u w/ [place] providers and use [employer] equipment for O2, O2 managed for travel per dgtr. No concerns voiced, will reach out to Acm for any further CCM needs.   Will graduate at this time.  PCP/Specialist follow up:   Future Appointments         Provider Specialty Dept Phone    2024 11:15 AM Miguel Angel Tavares MD Family Medicine [phone]    2024 2:30 PM Ilan Montana MD Cardiology [phone]            Follow Up:   No further Ambulatory Care Management follow-up scheduled at this time.  caregiver has Ambulatory Care Manager's contact information for any further questions, concerns or needs.

## 2024-07-03 ENCOUNTER — TELEPHONE (OUTPATIENT)
Dept: FAMILY MEDICINE CLINIC | Facility: CLINIC | Age: 84
End: 2024-07-03

## 2024-07-03 DIAGNOSIS — I50.22 CHRONIC SYSTOLIC CONGESTIVE HEART FAILURE (HCC): ICD-10-CM

## 2024-07-03 DIAGNOSIS — E78.2 MIXED HYPERLIPIDEMIA: ICD-10-CM

## 2024-07-03 DIAGNOSIS — I10 PRIMARY HYPERTENSION: ICD-10-CM

## 2024-07-03 RX ORDER — ROSUVASTATIN CALCIUM 10 MG/1
10 TABLET, COATED ORAL DAILY
Qty: 90 TABLET | Refills: 3 | Status: SHIPPED | OUTPATIENT
Start: 2024-07-03

## 2024-07-09 ENCOUNTER — TELEPHONE (OUTPATIENT)
Dept: FAMILY MEDICINE CLINIC | Facility: CLINIC | Age: 84
End: 2024-07-09

## 2024-07-09 DIAGNOSIS — I50.22 CHRONIC SYSTOLIC CONGESTIVE HEART FAILURE (HCC): ICD-10-CM

## 2024-07-09 DIAGNOSIS — E78.2 MIXED HYPERLIPIDEMIA: ICD-10-CM

## 2024-07-09 DIAGNOSIS — I10 PRIMARY HYPERTENSION: ICD-10-CM

## 2024-07-09 RX ORDER — ROSUVASTATIN CALCIUM 10 MG/1
10 TABLET, COATED ORAL DAILY
Qty: 90 TABLET | Refills: 3 | Status: SHIPPED | OUTPATIENT
Start: 2024-07-09

## 2024-07-12 NOTE — TELEPHONE ENCOUNTER
Spoke to patient and daughter went over KLAUS results and he states he has moved to [place] and looking for a provider there and informed him he can find a pulmonologist there and let us know and can refer to there.    Spoke to Dagoberto at PM and he states the patient was supposed to call back with the name of company and fax number will be using and never received a call back with name of company. Gave him the company name and phone number and he is going to reach out to them today and get paperwork sent over.  [employer].  [phone]  Daughter notified and voices understanding.

## 2024-07-16 ENCOUNTER — TELEPHONE (OUTPATIENT)
Dept: PULMONOLOGY | Age: 84
End: 2024-07-16

## 2024-07-16 DIAGNOSIS — J96.01 ACUTE HYPOXIC RESPIRATORY FAILURE (HCC): ICD-10-CM

## 2024-07-16 DIAGNOSIS — I50.22 CHRONIC SYSTOLIC CONGESTIVE HEART FAILURE (HCC): Primary | ICD-10-CM

## 2024-07-16 DIAGNOSIS — J90 PLEURAL EFFUSION ON LEFT: ICD-10-CM

## 2024-07-16 NOTE — TELEPHONE ENCOUNTER
Patient has moved to [place] and is not able to get oxygen . He has an appt with pulmonary in a month. Says he is not able to sleep , afraid to go to sleep. He is asking if Haritha Miller could help him get a concentrator just until he sees a doctor.     Patient says that he has reached out and to get his records . This is patients new address    [address] [place] [address]    [id number]

## 2024-07-16 NOTE — TELEPHONE ENCOUNTER
Spoke to daughter and she states still has not heard from anyone about her Dad's oxygen.     Called [facility], Spoke to Stefanie and she states the only thing needed is Rx sent to fax#  [phone]. Received all other records from [facility]. Order placed and faxed to fax number provided.    Patient and daughter notified and voices understanding.

## 2024-07-16 NOTE — TELEPHONE ENCOUNTER
Esme Carlton already spoke with the patient's daughter regarding this matter see the encounter notes  7/12/2024 by Yohannes GANDARA

## 2024-10-14 ENCOUNTER — TELEPHONE (OUTPATIENT)
Dept: PULMONOLOGY | Age: 84
End: 2024-10-14

## 2024-10-14 NOTE — TELEPHONE ENCOUNTER
Called pt and left VM for him to call to schedule he follow up with the provider. Sending letter as well

## 2025-05-06 DIAGNOSIS — K59.04 CHRONIC IDIOPATHIC CONSTIPATION: ICD-10-CM

## 2025-05-06 RX ORDER — LINACLOTIDE 72 UG/1
72 CAPSULE, GELATIN COATED ORAL
Qty: 30 CAPSULE | Refills: 0 | Status: SHIPPED | OUTPATIENT
Start: 2025-05-06

## (undated) DEVICE — PLASMABLADE X PS210-030S-LIGHT 3.0SL: Brand: PLASMABLADE™ X

## (undated) DEVICE — DRESSING POSTOP AG PRISMASEAL 3.5X6IN

## (undated) DEVICE — PINNACLE TIF INTRODUCER SHEATH: Brand: PINNACLE

## (undated) DEVICE — SUTURE ABSORBABLE MONOFILAMENT 3-0 PS 24 CM 26 MM VIO PDS +

## (undated) DEVICE — MICROPUNCTURE INTRODUCER SET SILHOUETTE TRANSITIONLESS WITH NITINOL WIRE GUIDE: Brand: MICROPUNCTURE

## (undated) DEVICE — 18G NG KIT WITH 96IN PROBE COVER (10 PK): Brand: SITE-RITE

## (undated) DEVICE — CATHETER ABLAT 8FR L115CM 1-6-2MM SPC TIP 3.5MM FJ CRV

## (undated) DEVICE — LIQUIBAND RAPID ADHESIVE 36/CS 0.8ML: Brand: MEDLINE

## (undated) DEVICE — STOPCOCK TRNSDUC 500PSI 3 W ROT M LUER LT BLU OFF HNDL R

## (undated) DEVICE — INTRODUCER LD L13CM OD6FR SPLITTABLE DIL W/ J TIP GWIRE SYR

## (undated) DEVICE — SUTURE ABSORBABLE MONOFILAMENT 4-0 CV15 6 IN PUR V-LOC 90 VLOCM1203

## (undated) DEVICE — GUIDE WIRE WITH HYDROPHILIC COATING: Brand: ACUITY WHISPER VIEW™

## (undated) DEVICE — SYSTEM SURG HEMSTAT PWD 1 GM POLYSACCHARIDE HEMOSPHERES

## (undated) DEVICE — GUIDE COR SNUS L40CM DIA9FR 0.035IN STD CRV ADV UNIQUE

## (undated) DEVICE — BOWL MED M 16OZ PLAS CAP GRAD

## (undated) DEVICE — TUBING PMP FOR CARTO SYS SMARTABLATE

## (undated) DEVICE — PATCH REF EXT FOR CARTO 3 SYS (EA = 6 PACKS)

## (undated) DEVICE — KIT ANGIO CNTRST ADMIN W O BWL WORLEY

## (undated) DEVICE — SUTURE ETHBND EXCEL SZ 0 L18IN NONABSORBABLE GRN L26MM MO-6 CX45D

## (undated) DEVICE — SYSTEM CLOSURE 6-12 FR VEN VASC VASCADE MVP